# Patient Record
Sex: FEMALE | Race: BLACK OR AFRICAN AMERICAN | NOT HISPANIC OR LATINO | Employment: OTHER | URBAN - METROPOLITAN AREA
[De-identification: names, ages, dates, MRNs, and addresses within clinical notes are randomized per-mention and may not be internally consistent; named-entity substitution may affect disease eponyms.]

---

## 2017-01-19 ENCOUNTER — TELEPHONE (OUTPATIENT)
Dept: FAMILY MEDICINE | Facility: CLINIC | Age: 57
End: 2017-01-19

## 2017-01-19 NOTE — TELEPHONE ENCOUNTER
Called, left message that we received a form through fax and she has not been seen may 2016 and would need an appt to get this form fill out. Put form in drawer at .

## 2017-01-23 ENCOUNTER — OFFICE VISIT (OUTPATIENT)
Dept: FAMILY MEDICINE | Facility: CLINIC | Age: 57
End: 2017-01-23

## 2017-01-23 VITALS
HEIGHT: 64 IN | HEART RATE: 101 BPM | OXYGEN SATURATION: 100 % | WEIGHT: 210.8 LBS | TEMPERATURE: 97.8 F | SYSTOLIC BLOOD PRESSURE: 119 MMHG | BODY MASS INDEX: 35.99 KG/M2 | DIASTOLIC BLOOD PRESSURE: 80 MMHG

## 2017-01-23 DIAGNOSIS — M54.32 SCIATIC NERVE PAIN, LEFT: Primary | ICD-10-CM

## 2017-01-23 RX ORDER — CYCLOBENZAPRINE HCL 10 MG
5-10 TABLET ORAL
Qty: 90 TABLET | Refills: 1 | Status: SHIPPED
Start: 2017-01-23 | End: 2017-03-17

## 2017-01-23 RX ORDER — ACETAMINOPHEN 500 MG
1000 TABLET ORAL 2 TIMES DAILY
Qty: 180 TABLET | Refills: 0 | Status: SHIPPED
Start: 2017-01-23 | End: 2019-04-05

## 2017-01-23 RX ORDER — GABAPENTIN 100 MG/1
100 CAPSULE ORAL 3 TIMES DAILY
Qty: 90 CAPSULE | Refills: 0 | Status: SHIPPED
Start: 2017-01-23 | End: 2017-03-17

## 2017-01-23 NOTE — PROGRESS NOTES
"       HPI:   Sushila Flores is a 56 year old  female with PMH of diffuse osteoarthritis who presents with forms. Patient requesting forms be filled out for her student loans so that they may be deferred as she is currently unable to work. Patient has paperwork for disability -- was supported by [facility] whom she follows with for low back pain. Also seen for bilateral knee OA. Is frustrated by care at Spine as they continue to recommend surgery and patient desires to avoid surgery -- last visit was in May 2016. Notes that patient has with her reveal diagnosis of lumbar spondylolisthesis and spine stenosis. She notes her pain started 15+ years ago -- she noted she had injured her right knee and subsequently had altered her gait, causing the start of her low back pain which was further worsened in 2000 when she fell onto her right hip while pregnant. Since that time she has had chronic low back pain. She has tried numerous treatments in the past. She notes that she has done physical therapy off and on over the years and found this helpful. Most recently, she was able to attend 4 sessions last year though then was unable to obtain medical rides through insurance (limited her to 2 rides per month) and was therefore no longer able to get to appointments. She felt she was getting stronger and desires to restart. Currently is doing home exercises 3x's per week. She has tried medications (various) and injections (didn't help) in the past. She describes her pain as \"agonizing burning sensation\" that won't go away, across entire low back with pain down her left leg to her toes.     Currently for pain she is using ibuprofen 400 mg BID per day, mostly this helps with her knee pain. Occasionally using lidoderm patches at night or a heating pad. Notes sleeping in recliner helps back at night. Previously was on gabapentin, felt this was helpful initially then \"wore off\". Flexeril helpful in the past.          PMHX: "     Patient Active Problem List   Diagnosis     Sciatic nerve pain     Vitamin D deficiency     Chronic back pain     Bilateral thumb pain     Bilateral carpal tunnel syndrome     Osteoarthritis     Primary osteoarthritis of both knees     Pap smear for cervical cancer screening       Current Outpatient Prescriptions   Medication Sig Dispense Refill     gabapentin (NEURONTIN) 100 MG capsule Take 1 capsule (100 mg) by mouth 3 times daily 90 capsule 0     acetaminophen (TYLENOL) 500 MG tablet Take 2 tablets (1,000 mg) by mouth 2 times daily 180 tablet 0     cyclobenzaprine (FLEXERIL) 10 MG tablet Take 0.5-1 tablets (5-10 mg) by mouth nightly as needed for muscle spasms 90 tablet 1     lidocaine (LIDODERM) 5 % patch Apply up to 3 patches to painful area at once for up to 12 h within a 24 h period.  Remove after 12 hours. 30 patch 2     acetaminophen (TYLENOL) 500 MG tablet Take 2 tablets (1,000 mg) by mouth every 6 hours as needed for mild pain 100 tablet 0       Family History   Problem Relation Age of Onset     DIABETES Brother      DIABETES Maternal Grandmother      Hypertension Sister      Other Cancer Maternal Grandfather      Breast Cancer Maternal Aunt      Breast Cancer Paternal Aunt      Other Cancer Paternal Grandmother      stomach cancer     Asthma No family hx of        Social History   Substance Use Topics     Smoking status: Former Smoker     Types: Cigarettes     Quit date: 08/01/2013     Smokeless tobacco: Never Used     Alcohol Use: 0.0 oz/week     0 Standard drinks or equivalent per week      Comment: ocassionally       Social History     Social History Narrative    Works at [employer]. 4 children, one set of twins. 3 grandkids.       Allergies   Allergen Reactions     Amoxicillin Swelling     Penicillin G Nausea and Vomiting     Percocet [Oxycodone-Acetaminophen]      Vicodin [Hydrocodone-Acetaminophen]        No results found for this or any previous visit (from the past 24  "hour(s)).         Physical Exam:     Filed Vitals:    01/23/17 1611 01/23/17 1616   BP: 145/92 119/80   Pulse: 101    Temp: 97.8  F (36.6  C)    TempSrc: Oral    Height: 5' 3.5\" (161.3 cm)    Weight: 210 lb 12.8 oz (95.618 kg)    SpO2: 100%      Body mass index is 36.75 kg/(m^2).    GENERAL APPEARANCE: healthy, alert and no distress  BACK: No excessive lordosises or kyphosis. Diffuse low back tenderness, most pronounced over left SI region. Normal lumbar flexion and extension.  Sensation grossly intact in bilateral lower extremities. 5/5 strength in hip, knee, and ankle flexion and extension. Reflexes normal and symmetric at patella and achilles.     Assessment and Plan   1. Sciatic nerve pain, left  Patient has been seen exclusively at [facility] spine for this in the past though frustrated as feels all she is currently being offered surgery, which she would like to avoid if able (mother had this surgery and ended up needing 4 repeat operations). She would like to transfer care to me for ongoing management of back pain. We reviewed previous medications. She desires to restart physical therapy at this time. She reports insurance will only pay for 2 medical rides per month, though there is a form we can fill out to ask for more -- patient will work on getting this. We reviewed medications -- we will start scheduled tylenol, restart gapapentin at 100 mg TID and flexeril PRN at night to try and get her pain more manageable. Patient desires to eventually go back to work but at this time is unable to due to back pain. BRADLEY was requested for  Spine records so that we can fully understand her symptoms, previous evaluation/treatment and imaging.   - gabapentin (NEURONTIN) 100 MG capsule; Take 1 capsule (100 mg) by mouth 3 times daily  Dispense: 90 capsule; Refill: 0  - acetaminophen (TYLENOL) 500 MG tablet; Take 2 tablets (1,000 mg) by mouth 2 times daily  Dispense: 180 tablet; Refill: 0  - cyclobenzaprine (FLEXERIL) 10 " MG tablet; Take 0.5-1 tablets (5-10 mg) by mouth nightly as needed for muscle spasms  Dispense: 90 tablet; Refill: 1  - PHYSICAL THERAPY REFERRAL; Future    RTC in 2 weeks for follow up of pain.    Options for treatment and follow-up care were reviewed with the patient and/or guardian. Chester MEDARDO Flores and/or guardian engaged in the decision making process and verbalized understanding of the options discussed and agreed with the final plan.    Dipti Self MD  [facility] Medicine Resident  Pager# [phone]    Precepted with: Marco Antonio Vences MD

## 2017-01-23 NOTE — MR AVS SNAPSHOT
After Visit Summary   1/23/2017    Sushila Flores    MRN: [id number]           Patient Information     Date Of Birth          1960        Visit Information        Provider Department      1/23/2017 4:00 PM Dipti Self MD [facility]        Today's Diagnoses     Sciatic nerve pain, left    -  1       Care Instructions    Take 1000 mg tylenol twice daily every day.    Take 100 mg gabapentin three times daily every day.    Use 5-10 flexeril at bedtime as needed.    Restart physical therapy.    Follow up in 2 weeks.         Follow-ups after your visit        Additional Services     PHYSICAL THERAPY REFERRAL       PT/OT REFERRAL  Sushila Flores  1960  Phone #: [phone] (home)    needed: No  Language: English    PT/OT  Facility:   [facility] in [place]    History: history of chronic back with left sided sciatica    Precautions/Contraindications: None    Evaluate and treat up to 12 session, please teach home exercise plan  Goals of decreased pain, increased ROM and flexibility                  Your next 10 appointments already scheduled     Feb 06, 2017  1:20 PM   Return Visit with Dipti Self MD   [facility] ([facility])    [address] [place] [address]   [phone]              Future tests that were ordered for you today     Open Future Orders        Priority Expected Expires Ordered    PHYSICAL THERAPY REFERRAL Routine  4/23/2017 1/23/2017            Who to contact     Please call your clinic at [phone] to:    Ask questions about your health    Make or cancel appointments    Discuss your medicines    Learn about your test results    Speak to your doctor   If you have compliments or concerns about an experience at your clinic, or if you wish to file a complaint, please contact [facility] Patient Relations at [phone] or email us at [email or web address]         Additional  "Information About Your Visit        [employer] Information     [employer] gives you secure access to your electronic health record. If you see a primary care provider, you can also send messages to your care team and make appointments. If you have questions, please call your primary care clinic.  If you do not have a primary care provider, please call [phone] and they will assist you.      [employer] is an electronic gateway that provides easy, online access to your medical records. With [employer], you can request a clinic appointment, read your test results, renew a prescription or communicate with your care team.     To access your existing account, please contact your [facility] or call [phone] for assistance.        Care EveryWhere ID     This is your Care EveryWhere ID. This could be used by other organizations to access your [facility] medical records  [id number]        Your Vitals Were     Pulse Temperature Height BMI (Body Mass Index) Pulse Oximetry       101 97.8  F (36.6  C) (Oral) 5' 3.5\" (161.3 cm) 36.75 kg/m2 100%        Blood Pressure from Last 3 Encounters:   01/23/17 119/80   05/27/16 132/84   04/05/16 120/84    Weight from Last 3 Encounters:   01/23/17 210 lb 12.8 oz (95.618 kg)   05/27/16 204 lb 3.2 oz (92.625 kg)   04/05/16 210 lb 12.8 oz (95.618 kg)                 Today's Medication Changes          These changes are accurate as of: 1/23/17  4:49 PM.  If you have any questions, ask your nurse or doctor.               These medicines have changed or have updated prescriptions.        Dose/Directions    * acetaminophen 500 MG tablet   Commonly known as:  TYLENOL   This may have changed:  Another medication with the same name was added. Make sure you understand how and when to take each.   Used for:  Bilateral thumb pain   Changed by:  Dipti Self MD        Dose:  1000 mg   Take 2 tablets (1,000 mg) by mouth every 6 hours as needed for mild pain   Quantity:  " 100 tablet   Refills:  0       * acetaminophen 500 MG tablet   Commonly known as:  TYLENOL   This may have changed:  You were already taking a medication with the same name, and this prescription was added. Make sure you understand how and when to take each.   Used for:  Sciatic nerve pain, left   Changed by:  Dipti Self MD        Dose:  1000 mg   Take 2 tablets (1,000 mg) by mouth 2 times daily   Quantity:  180 tablet   Refills:  0       cyclobenzaprine 10 MG tablet   Commonly known as:  FLEXERIL   This may have changed:    - medication strength  - how much to take  - when to take this   Used for:  Sciatic nerve pain, left   Changed by:  Dipti Self MD        Dose:  5-10 mg   Take 0.5-1 tablets (5-10 mg) by mouth nightly as needed for muscle spasms   Quantity:  90 tablet   Refills:  1       gabapentin 100 MG capsule   Commonly known as:  NEURONTIN   This may have changed:  how much to take   Used for:  Sciatic nerve pain, left   Changed by:  Dipti Self MD        Dose:  100 mg   Take 1 capsule (100 mg) by mouth 3 times daily   Quantity:  90 capsule   Refills:  0       * Notice:  This list has 2 medication(s) that are the same as other medications prescribed for you. Read the directions carefully, and ask your doctor or other care provider to review them with you.      Stop taking these medicines if you haven't already. Please contact your care team if you have questions.     cetirizine 10 MG tablet   Commonly known as:  zyrTEC   Stopped by:  Dipti Self MD           fluticasone 50 MCG/ACT spray   Commonly known as:  FLONASE   Stopped by:  Dipti Self MD                Where to get your medicines      These medications were sent to [id number] - [place], [place] - [address], [place] [address]     Phone:  [phone]    - acetaminophen 500 MG tablet  - cyclobenzaprine 10 MG tablet  - gabapentin 100 MG capsule              Primary Care Provider Office Phone # Fax #    Dipti Parisa Self [phone]       [facility] [address] [place] [address]        Thank you!     Thank you for choosing [facility]  for your care. Our goal is always to provide you with excellent care. Hearing back from our patients is one way we can continue to improve our services. Please take a few minutes to complete the written survey that you may receive in the mail after your visit with us. Thank you!             Your Updated Medication List - Protect others around you: Learn how to safely use, store and throw away your medicines at www.disposemymeds.org.          This list is accurate as of: 1/23/17  4:49 PM.  Always use your most recent med list.                   Brand Name Dispense Instructions for use    * acetaminophen 500 MG tablet    TYLENOL    100 tablet    Take 2 tablets (1,000 mg) by mouth every 6 hours as needed for mild pain       * acetaminophen 500 MG tablet    TYLENOL    180 tablet    Take 2 tablets (1,000 mg) by mouth 2 times daily       cyclobenzaprine 10 MG tablet    FLEXERIL    90 tablet    Take 0.5-1 tablets (5-10 mg) by mouth nightly as needed for muscle spasms       gabapentin 100 MG capsule    NEURONTIN    90 capsule    Take 1 capsule (100 mg) by mouth 3 times daily       lidocaine 5 % Patch    LIDODERM    30 patch    Apply up to 3 patches to painful area at once for up to 12 h within a 24 h period.  Remove after 12 hours.       * Notice:  This list has 2 medication(s) that are the same as other medications prescribed for you. Read the directions carefully, and ask your doctor or other care provider to review them with you.

## 2017-01-23 NOTE — PATIENT INSTRUCTIONS
Take 1000 mg tylenol twice daily every day.    Take 100 mg gabapentin three times daily every day.    Use 5-10 flexeril at bedtime as needed.    Restart physical therapy.    Follow up in 2 weeks.           Spoke with patient on the referral for physical therapy.  She wanted to schedule hew own appt. And was given number to [facility] in [place] and referral has also been faxed to clinic.     Miguel

## 2017-01-23 NOTE — PROGRESS NOTES
Preceptor Attestation:  Patient's case reviewed and discussed with Dipti Self MD Patient seen and discussed with the resident.. I agree with assessment and plan of care.  Supervising Physician:  Marco Antonio Vences MD  [facility]

## 2017-02-07 PROBLEM — M47.812 SPONDYLOSIS OF CERVICAL REGION WITHOUT MYELOPATHY OR RADICULOPATHY: Status: ACTIVE | Noted: 2017-02-07

## 2017-03-17 ENCOUNTER — OFFICE VISIT (OUTPATIENT)
Dept: FAMILY MEDICINE | Facility: CLINIC | Age: 57
End: 2017-03-17

## 2017-03-17 ENCOUNTER — RECORDS - HEALTHEAST (OUTPATIENT)
Dept: ADMINISTRATIVE | Facility: OTHER | Age: 57
End: 2017-03-17

## 2017-03-17 VITALS
TEMPERATURE: 98 F | SYSTOLIC BLOOD PRESSURE: 123 MMHG | HEART RATE: 94 BPM | WEIGHT: 212 LBS | HEIGHT: 64 IN | RESPIRATION RATE: 16 BRPM | OXYGEN SATURATION: 100 % | BODY MASS INDEX: 36.19 KG/M2 | DIASTOLIC BLOOD PRESSURE: 81 MMHG

## 2017-03-17 DIAGNOSIS — Z12.11 COLON CANCER SCREENING: ICD-10-CM

## 2017-03-17 DIAGNOSIS — Z12.39 BREAST CANCER SCREENING: ICD-10-CM

## 2017-03-17 DIAGNOSIS — R73.09 ELEVATED HEMOGLOBIN A1C: ICD-10-CM

## 2017-03-17 DIAGNOSIS — M54.32 SCIATIC NERVE PAIN, LEFT: Primary | ICD-10-CM

## 2017-03-17 DIAGNOSIS — Z11.59 NEED FOR HEPATITIS C SCREENING TEST: ICD-10-CM

## 2017-03-17 DIAGNOSIS — K59.00 CONSTIPATION, UNSPECIFIED CONSTIPATION TYPE: ICD-10-CM

## 2017-03-17 DIAGNOSIS — Z23 IMMUNIZATION DUE: ICD-10-CM

## 2017-03-17 LAB
BUN SERPL-MCNC: 11 MG/DL (ref 7–30)
CALCIUM SERPL-MCNC: 9.3 MG/DL (ref 8.5–10.4)
CHLORIDE SERPLBLD-SCNC: 103 MMOL/L (ref 94–109)
CHOLEST SERPL-MCNC: 193 MG/DL
CHOLEST/HDLC SERPL: 3 RATIO
CO2 SERPL-SCNC: 25 MMOL/L (ref 20–32)
CREAT SERPL-MCNC: 0.9 MG/DL (ref 0.6–1.3)
EGFR CALCULATED (BLACK REFERENCE): 83.9 ML/MIN
EGFR CALCULATED (NON BLACK REFERENCE): 69.3 ML/MIN
GLUCOSE SERPL-MCNC: 112 MG/DL (ref 60–109)
HBA1C MFR BLD: 6.7 % (ref 4.1–5.7)
HDLC SERPL-MCNC: 65 MG/DL
LDLC SERPL CALC-MCNC: 112 MG/DL (ref 0–99)
POTASSIUM SERPL-SCNC: 4.2 MMOL/L (ref 3.4–5.3)
SODIUM SERPL-SCNC: 138 MMOL/L (ref 133–144)
TRIGL SERPL-MCNC: 82 MG/DL
VLDL-CHOLESTEROL: 16 MG/DL (ref 7–32)

## 2017-03-17 RX ORDER — POLYETHYLENE GLYCOL 3350 17 G/17G
1 POWDER, FOR SOLUTION ORAL DAILY
Qty: 510 G | Refills: 1 | Status: SHIPPED
Start: 2017-03-17 | End: 2019-07-25

## 2017-03-17 RX ORDER — CYCLOBENZAPRINE HCL 10 MG
10 TABLET ORAL
Qty: 90 TABLET | Refills: 0 | Status: SHIPPED
Start: 2017-03-17 | End: 2018-05-30

## 2017-03-17 RX ORDER — AMOXICILLIN 250 MG
1 CAPSULE ORAL 2 TIMES DAILY
Qty: 180 TABLET | Refills: 1 | Status: SHIPPED
Start: 2017-03-17 | End: 2019-04-05

## 2017-03-17 RX ORDER — GABAPENTIN 100 MG/1
100 CAPSULE ORAL AT BEDTIME
Qty: 90 CAPSULE | Refills: 0 | COMMUNITY
Start: 2017-03-17 | End: 2017-11-08

## 2017-03-17 ASSESSMENT — ANXIETY QUESTIONNAIRES
2. NOT BEING ABLE TO STOP OR CONTROL WORRYING: SEVERAL DAYS
1. FEELING NERVOUS, ANXIOUS, OR ON EDGE: SEVERAL DAYS
3. WORRYING TOO MUCH ABOUT DIFFERENT THINGS: SEVERAL DAYS
5. BEING SO RESTLESS THAT IT IS HARD TO SIT STILL: NOT AT ALL
6. BECOMING EASILY ANNOYED OR IRRITABLE: SEVERAL DAYS
7. FEELING AFRAID AS IF SOMETHING AWFUL MIGHT HAPPEN: NOT AT ALL
IF YOU CHECKED OFF ANY PROBLEMS ON THIS QUESTIONNAIRE, HOW DIFFICULT HAVE THESE PROBLEMS MADE IT FOR YOU TO DO YOUR WORK, TAKE CARE OF THINGS AT HOME, OR GET ALONG WITH OTHER PEOPLE: SOMEWHAT DIFFICULT
GAD7 TOTAL SCORE: 4

## 2017-03-17 ASSESSMENT — PATIENT HEALTH QUESTIONNAIRE - PHQ9: 5. POOR APPETITE OR OVEREATING: NOT AT ALL

## 2017-03-17 NOTE — PATIENT INSTRUCTIONS
For back pain:  -take tylenol 1000 mg twice daily  -take gabapentin 100 mg only at night  -take flexeril 10 mg at night  -do exercises in the morning  -ok to use ice or heat  -go to physical therapy as it works for your schedule  -stay active!    For constipation:  -start pericolace 1 pill twice daily  -use 1 capful of miralax daily  -drink lots of water (8 glasses daily!)  -eat enough fiber (goal of 20-30 grams daily)    Follow up in 1 month.      Referral for ( TEST )  :      Mammogram  LOCATION/PLACE/Provider :    [address] [address]  DATE & TIME :     3-20-17 at 11:15  PHONE :     [phone]  FAX :     [phone]  ADDITIONAL INFORMATION :     NA  Appointment made by clinic staff/:    Miguel

## 2017-03-17 NOTE — MR AVS SNAPSHOT
After Visit Summary   3/17/2017    Sushila Flores    MRN: [id number]           Patient Information     Date Of Birth          1960        Visit Information        Provider Department      3/17/2017 1:20 PM Dipti Self MD [facility]        Today's Diagnoses     Sciatic nerve pain, left    -  1    Constipation, unspecified constipation type        Elevated hemoglobin A1c        Immunization due        Colon cancer screening        Breast cancer screening        Need for hepatitis C screening test          Care Instructions    For back pain:  -take tylenol 1000 mg twice daily  -take gabapentin 100 mg only at night  -take flexeril 10 mg at night  -do exercises in the morning  -ok to use ice or heat  -go to physical therapy as it works for your schedule  -stay active!    For constipation:  -start pericolace 1 pill twice daily  -use 1 capful of miralax daily  -drink lots of water (8 glasses daily!)  -eat enough fiber (goal of 20-30 grams daily)    Follow up in 1 month.        Follow-ups after your visit        Future tests that were ordered for you today     Open Future Orders        Priority Expected Expires Ordered    Fecal Occult Blood - FIT, iFOB ([email or web address] FM) Routine  4/24/2017 3/17/2017            Who to contact     Please call your clinic at [phone] to:    Ask questions about your health    Make or cancel appointments    Discuss your medicines    Learn about your test results    Speak to your doctor   If you have compliments or concerns about an experience at your clinic, or if you wish to file a complaint, please contact [facility] Patient Relations at [phone] or email us at [email or web address]         Additional Information About Your Visit        MyChart Information     [employer] gives you secure access to your electronic health record. If you see a primary care provider, you can also send messages to your care team and make  "appointments. If you have questions, please call your primary care clinic.  If you do not have a primary care provider, please call [phone] and they will assist you.      [employer] is an electronic gateway that provides easy, online access to your medical records. With [employer], you can request a clinic appointment, read your test results, renew a prescription or communicate with your care team.     To access your existing account, please contact your [facility] or call [phone] for assistance.        Care EveryWhere ID     This is your Care EveryWhere ID. This could be used by other organizations to access your Mobile medical records  [id number]        Your Vitals Were     Pulse Temperature Respirations Height Pulse Oximetry BMI (Body Mass Index)    94 98  F (36.7  C) (Oral) 16 5' 3.5\" (161.3 cm) 100% 36.97 kg/m2       Blood Pressure from Last 3 Encounters:   03/17/17 123/81   01/23/17 119/80   05/27/16 132/84    Weight from Last 3 Encounters:   03/17/17 212 lb (96.2 kg)   01/23/17 210 lb 12.8 oz (95.6 kg)   05/27/16 204 lb 3.2 oz (92.6 kg)              We Performed the Following     ADMIN VACCINE, INITIAL     Basic Metabolic Panel ([facility]) - Results < 1 hr     Hemoglobin A1c (P )     Hepatitis C Antibody ([facility])     Lipid Panel ([facility])     MA SCREENING DIGITAL BILAT     TDAP ([employer]RIX AGES 10 and older)          Today's Medication Changes          These changes are accurate as of: 3/17/17  1:56 PM.  If you have any questions, ask your nurse or doctor.               Start taking these medicines.        Dose/Directions    polyethylene glycol powder   Commonly known as:  MIRALAX   Used for:  Constipation, unspecified constipation type   Started by:  Dipti Self MD        Dose:  1 capful   Take 17 g (1 capful) by mouth daily   Quantity:  510 g   Refills:  1       senna-docusate 8.6-50 MG per tablet   Commonly known as:  SENOKOT-S;PERICOLACE   Used for:  " Constipation, unspecified constipation type   Started by:  Dipti Self MD        Dose:  1 tablet   Take 1 tablet by mouth 2 times daily   Quantity:  180 tablet   Refills:  1         These medicines have changed or have updated prescriptions.        Dose/Directions    acetaminophen 500 MG tablet   Commonly known as:  TYLENOL   This may have changed:  Another medication with the same name was removed. Continue taking this medication, and follow the directions you see here.   Used for:  Sciatic nerve pain, left   Changed by:  Dipti Self MD        Dose:  1000 mg   Take 2 tablets (1,000 mg) by mouth 2 times daily   Quantity:  180 tablet   Refills:  0       cyclobenzaprine 10 MG tablet   Commonly known as:  FLEXERIL   This may have changed:  how much to take   Used for:  Sciatic nerve pain, left   Changed by:  Dipti Self MD        Dose:  10 mg   Take 1 tablet (10 mg) by mouth nightly as needed for muscle spasms   Quantity:  90 tablet   Refills:  0       gabapentin 100 MG capsule   Commonly known as:  NEURONTIN   This may have changed:  when to take this   Used for:  Sciatic nerve pain, left   Changed by:  Dipti Self MD        Dose:  100 mg   Take 1 capsule (100 mg) by mouth At Bedtime   Quantity:  90 capsule   Refills:  0         Stop taking these medicines if you haven't already. Please contact your care team if you have questions.     lidocaine 5 % Patch   Commonly known as:  LIDODERM   Stopped by:  Dipti Self MD                Where to get your medicines      These medications were sent to [id number] - [place], [place] - [address], [place] [address]     Phone:  [phone]     cyclobenzaprine 10 MG tablet    polyethylene glycol powder    senna-docusate 8.6-50 MG per tablet                Primary Care Provider Office Phone # Fax #    Dipti Self [phone]       [facility] [id number]  [facility] [address]        Thank you!     Thank you for choosing [facility]  for your care. Our goal is always to provide you with excellent care. Hearing back from our patients is one way we can continue to improve our services. Please take a few minutes to complete the written survey that you may receive in the mail after your visit with us. Thank you!             Your Updated Medication List - Protect others around you: Learn how to safely use, store and throw away your medicines at www.disposemymeds.org.          This list is accurate as of: 3/17/17  1:56 PM.  Always use your most recent med list.                   Brand Name Dispense Instructions for use    acetaminophen 500 MG tablet    TYLENOL    180 tablet    Take 2 tablets (1,000 mg) by mouth 2 times daily       cyclobenzaprine 10 MG tablet    FLEXERIL    90 tablet    Take 1 tablet (10 mg) by mouth nightly as needed for muscle spasms       gabapentin 100 MG capsule    NEURONTIN    90 capsule    Take 1 capsule (100 mg) by mouth At Bedtime       polyethylene glycol powder    MIRALAX    510 g    Take 17 g (1 capful) by mouth daily       senna-docusate 8.6-50 MG per tablet    SENOKOT-S;PERICOLACE    180 tablet    Take 1 tablet by mouth 2 times daily

## 2017-03-17 NOTE — PROGRESS NOTES
"       HPI:   Sushila Flores is a 56 year old  female with PMH of chronic back pain with sciatica who presents for follow up. Last seen on 1/23, planned for me to assume care of her back pain. Had been seen by [facility] but recommendation for surgery last summer, which patient does not want to pursue. Records reviewed from [facility] -- 12/15 lumbar MRI with 2 mm spondylolisthesis at L5/S1 and L4/L5 with 2mm right posterolateral disk protrusion at T11/12. Has tried numerous therapies in the past, including physical therapy, epidural injections, and NSAIDs without significant relief. Patients mother had back surgery, requiring frequent repeat surgeries, thus patient is motivated to avoid.     At 1/23 visit, we discussed using tylenol 1000 mg BID with 100 mg TID and flexeril 10 mg prn as well as restarting physical therapy as patient felt this was previous helpful. Currently, she is using tylenol PRN instead of scheduled. She has been taking 100 mg gabapentin at bedtime only for 1 week as she was sedated when taking during daytime. Unfortunately, she has only been able to make 2 therapy appointments due to needing to provide  for her 10 month old grandson. Has been doing home exercises nightly, as this is when pain is the worst. Notes she \"tenses up\" after completing exercises and then is unable to rest comfortably. Sleeping well despite pain, using flexeril 10 mg and gabapentin 100 mg nightly.     Has been constipated for the past month. Notes BMs are \"little nuggets\" and quite hard. Feels better after taking Maalox. Crampy abdominal pain, occasional nausea, vomited x's 1 after eating. Notes she is drinking less water so she urinates less.          PMHX:     Patient Active Problem List   Diagnosis     Sciatic nerve pain     Vitamin D deficiency     Chronic back pain     Bilateral thumb pain     Bilateral carpal tunnel syndrome     Osteoarthritis     Primary osteoarthritis of both knees     Pap " "smear for cervical cancer screening     Spondylosis of cervical region without myelopathy or radiculopathy       Current Outpatient Prescriptions   Medication Sig Dispense Refill     cyclobenzaprine (FLEXERIL) 10 MG tablet Take 1 tablet (10 mg) by mouth nightly as needed for muscle spasms 90 tablet 0     gabapentin (NEURONTIN) 100 MG capsule Take 1 capsule (100 mg) by mouth At Bedtime 90 capsule 0     senna-docusate (SENOKOT-S;PERICOLACE) 8.6-50 MG per tablet Take 1 tablet by mouth 2 times daily 180 tablet 1     polyethylene glycol (MIRALAX) powder Take 17 g (1 capful) by mouth daily 510 g 1     acetaminophen (TYLENOL) 500 MG tablet Take 2 tablets (1,000 mg) by mouth 2 times daily (Patient not taking: Reported on 3/17/2017) 180 tablet 0       Family History   Problem Relation Age of Onset     DIABETES Brother      DIABETES Maternal Grandmother      Hypertension Sister      Other Cancer Maternal Grandfather      Breast Cancer Maternal Aunt      Breast Cancer Paternal Aunt      Other Cancer Paternal Grandmother      stomach cancer     Asthma No family hx of        Social History   Substance Use Topics     Smoking status: Former Smoker     Types: Cigarettes     Quit date: 8/1/2013     Smokeless tobacco: Never Used     Alcohol use 0.0 oz/week     0 Standard drinks or equivalent per week      Comment: ocassionally       Social History     Social History Narrative    Works at [employer]. 4 children, one set of twins. 3 grandkids.       Allergies   Allergen Reactions     Amoxicillin Swelling     Penicillin G Nausea and Vomiting     Percocet [Oxycodone-Acetaminophen]      Vicodin [Hydrocodone-Acetaminophen]             Physical Exam:     Vitals:    03/17/17 1303   BP: 123/81   Pulse: 94   Resp: 16   Temp: 98  F (36.7  C)   TempSrc: Oral   SpO2: 100%   Weight: 212 lb (96.2 kg)   Height: 5' 3.5\" (161.3 cm)     Body mass index is 36.97 kg/(m^2).    GENERAL APPEARANCE: healthy, alert and no distress, " obese  EYES: Eyes grossly normal to inspection  RESP: lungs clear to auscultation - no rales, rhonchi or wheezes  CV: regular rate and rhythm,  and no murmur, click,  rub or gallop  ABDOMEN: soft, nontender, without hepatosplenomegaly or masses, active bowel sounds  MS: extremities normal- no gross deformities noted, ambulating normally  SKIN: no suspicious lesions or rashes  PSYCH: mood and affect normal/bright    Assessment and Plan   1. Sciatic nerve pain, left  Recommended using tylenol 1000 mg BID on scheduled basis. Agree with plan to take gabapentin only at night at this time, with plans to slowly increase over time as tolerated. Ideally, would not be using flexeril nightly though notes it is significantly helpful at this time. Recommended home exercised in the morning, as opposed to at night, so she has the day to then stretch muscles. Encouraged continued activity (active with 10 month old grandson) with physical therapy appointments as her schedule allows (thinks it will be more stable in the next few months). Ok to use ice or heat as needed for pain. Reviewed goal of tolerable pain, not pain free.   - cyclobenzaprine (FLEXERIL) 10 MG tablet; Take 1 tablet (10 mg) by mouth nightly as needed for muscle spasms  Dispense: 90 tablet; Refill: 0  - gabapentin (NEURONTIN) 100 MG capsule; Take 1 capsule (100 mg) by mouth At Bedtime  Dispense: 90 capsule; Refill: 0    2. Constipation, unspecified constipation type  Likely contributing to back pain as well. Reviewed fiber/water intake as well as starting pericolace and miralax.   - senna-docusate (SENOKOT-S;PERICOLACE) 8.6-50 MG per tablet; Take 1 tablet by mouth 2 times daily  Dispense: 180 tablet; Refill: 1  - polyethylene glycol (MIRALAX) powder; Take 17 g (1 capful) by mouth daily  Dispense: 510 g; Refill: 1    3. Elevated hemoglobin A1c  Noted to be 6.0% in 2014, due for recheck today. Would consider initiation of metformin if still > 5.7%.  - Basic Metabolic  Panel ([facility]) - Results < 1 hr  - Lipid Panel ([facility])  - Hemoglobin A1c ([facility])    4. Immunization due  - ADMIN VACCINE, INITIAL  - TDAP (BOOSTRIX AGES 10 and older)    5. Colon cancer screening  - Fecal Occult Blood - FIT, iFOB ([facility]); Future    6. Breast cancer screening  - MA SCREENING DIGITAL BILAT    7. Need for hepatitis C screening test  - Hepatitis C Antibody ([facility])    Follow up in 1 month.     Options for treatment and follow-up care were reviewed with the patient and/or guardian. Sushila Flores and/or guardian engaged in the decision making process and verbalized understanding of the options discussed and agreed with the final plan.    Dipti Self MD  [facility] Resident  Pager# [phone]    Precepted with: Majo Ortega MD

## 2017-03-17 NOTE — NURSING NOTE
Gad7/phq9-given to complete  Hep C  Colonoscopy or FIT- agreed to fit     Mammogram- pt agreed   Tdap- given today

## 2017-03-18 ASSESSMENT — ANXIETY QUESTIONNAIRES: GAD7 TOTAL SCORE: 4

## 2017-03-18 ASSESSMENT — PATIENT HEALTH QUESTIONNAIRE - PHQ9: SUM OF ALL RESPONSES TO PHQ QUESTIONS 1-9: 12

## 2017-03-19 LAB — HCV AB SER QL: NEGATIVE

## 2017-03-20 ENCOUNTER — HOSPITAL ENCOUNTER (OUTPATIENT)
Dept: MAMMOGRAPHY | Facility: HOSPITAL | Age: 57
Discharge: HOME | End: 2017-03-20

## 2017-03-20 DIAGNOSIS — Z12.31 VISIT FOR SCREENING MAMMOGRAM: ICD-10-CM

## 2017-03-20 DIAGNOSIS — Z12.11 COLON CANCER SCREENING: ICD-10-CM

## 2017-03-20 LAB — HEMOCCULT STL QL IA: POSITIVE

## 2017-03-21 PROBLEM — E11.9 TYPE 2 DIABETES MELLITUS WITHOUT COMPLICATION (H): Status: ACTIVE | Noted: 2017-03-21

## 2017-03-21 PROBLEM — N18.2 CKD (CHRONIC KIDNEY DISEASE) STAGE 2, GFR 60-89 ML/MIN: Status: ACTIVE | Noted: 2017-03-21

## 2017-03-21 PROBLEM — Z91.89 CANDIDATE FOR STATIN THERAPY DUE TO RISK OF FUTURE CARDIOVASCULAR EVENT: Status: ACTIVE | Noted: 2017-03-21

## 2017-03-22 NOTE — PROGRESS NOTES
Preceptor Attestation:  Patient's case reviewed and discussed with Dipti Self MD resident and I evaluated the patient. I agree with written assessment and plan of care.    I spoke with Sushila separately re: potential alternative approaches for chronic pain such as yoga, massage, and acupuncture. She will check with her health insurance to determine if she has any coverage for these services. If not, group acupuncture may be an option.    Supervising Physician:  Majo Ortega   [facility]

## 2017-03-24 ENCOUNTER — MYC MEDICAL ADVICE (OUTPATIENT)
Dept: FAMILY MEDICINE | Facility: CLINIC | Age: 57
End: 2017-03-24

## 2017-04-13 ENCOUNTER — OFFICE VISIT (OUTPATIENT)
Dept: FAMILY MEDICINE | Facility: CLINIC | Age: 57
End: 2017-04-13

## 2017-04-13 VITALS
TEMPERATURE: 98.4 F | WEIGHT: 209.6 LBS | SYSTOLIC BLOOD PRESSURE: 130 MMHG | HEART RATE: 100 BPM | HEIGHT: 65 IN | OXYGEN SATURATION: 100 % | DIASTOLIC BLOOD PRESSURE: 82 MMHG | RESPIRATION RATE: 20 BRPM | BODY MASS INDEX: 34.92 KG/M2

## 2017-04-13 DIAGNOSIS — E11.9 TYPE 2 DIABETES MELLITUS WITHOUT COMPLICATION, WITHOUT LONG-TERM CURRENT USE OF INSULIN (H): ICD-10-CM

## 2017-04-13 DIAGNOSIS — Z91.89 CANDIDATE FOR STATIN THERAPY DUE TO RISK OF FUTURE CARDIOVASCULAR EVENT: ICD-10-CM

## 2017-04-13 DIAGNOSIS — M54.32 SCIATIC NERVE PAIN, LEFT: ICD-10-CM

## 2017-04-13 DIAGNOSIS — R19.5 POSITIVE FIT (FECAL IMMUNOCHEMICAL TEST): Primary | ICD-10-CM

## 2017-04-13 RX ORDER — ATORVASTATIN CALCIUM 80 MG/1
80 TABLET, FILM COATED ORAL DAILY
Qty: 90 TABLET | Refills: 3 | Status: SHIPPED | OUTPATIENT
Start: 2017-04-13 | End: 2019-07-31

## 2017-04-13 NOTE — PROGRESS NOTES
HPI:   Sushila Flores is a 56 year old  female with PMH of chronic low back pain and obesity who presents to review recent lab results. Saw results on MyChart and looked up what they meant. Was nervous about colon cancer and is agreeable to getting colonoscopy. Remembers that she was bearing down around the time of giving FIT sample so wonders if this was why positive. Constipation has improved with Miralax and Pericolace.     Also looked up what a hemoglobin a1c was and saw that she is now diabetic. Since this realization, she has started working on cleaning up her diet. Cut out pop (previously drinking 2L per day), stopped eating favorite snack of ice cream, and has started baking foods rather than frying. Is down 3 pounds since last visit, which she is very proud of.    Dr. Ortega had mentioned accupuncture for low back pain at last visit. Patient looked into this and found that insurance will cover 20 visits per year. Requesting rx for this.         PMHX:     Patient Active Problem List   Diagnosis     Sciatic nerve pain     Vitamin D deficiency     Chronic back pain     Bilateral carpal tunnel syndrome     Osteoarthritis of multiple joints     Pap smear for cervical cancer screening     Spondylosis of cervical region without myelopathy or radiculopathy     BMI 36.0-36.9,adult     Candidate for statin therapy due to risk of future cardiovascular event     Type 2 diabetes mellitus without complication (H)     CKD (chronic kidney disease) stage 2, GFR 60-89 ml/min       Current Outpatient Prescriptions   Medication Sig Dispense Refill     cyclobenzaprine (FLEXERIL) 10 MG tablet Take 1 tablet (10 mg) by mouth nightly as needed for muscle spasms 90 tablet 0     gabapentin (NEURONTIN) 100 MG capsule Take 1 capsule (100 mg) by mouth At Bedtime 90 capsule 0     senna-docusate (SENOKOT-S;PERICOLACE) 8.6-50 MG per tablet Take 1 tablet by mouth 2 times daily 180 tablet 1     polyethylene glycol (MIRALAX) powder  "Take 17 g (1 capful) by mouth daily 510 g 1     acetaminophen (TYLENOL) 500 MG tablet Take 2 tablets (1,000 mg) by mouth 2 times daily (Patient not taking: Reported on 3/17/2017) 180 tablet 0       Family History   Problem Relation Age of Onset     DIABETES Brother      DIABETES Maternal Grandmother      Hypertension Sister      Other Cancer Maternal Grandfather      Breast Cancer Maternal Aunt      Breast Cancer Paternal Aunt      Other Cancer Paternal Grandmother      stomach cancer     Asthma No family hx of        Social History   Substance Use Topics     Smoking status: Former Smoker     Types: Cigarettes     Quit date: 8/1/2013     Smokeless tobacco: Never Used     Alcohol use 0.0 oz/week     0 Standard drinks or equivalent per week      Comment: ocassionally       Social History     Social History Narrative    Works at [employer]. 4 children, one set of twins. 3 grandkids.       Allergies   Allergen Reactions     Amoxicillin Swelling     Penicillin G Nausea and Vomiting     Percocet [Oxycodone-Acetaminophen]      Vicodin [Hydrocodone-Acetaminophen]             Physical Exam:     Vitals:    04/13/17 1613   BP: 130/82   BP Location: Right arm   Patient Position: Chair   Cuff Size: Adult Large   Pulse: 100   Resp: 20   Temp: 98.4  F (36.9  C)   TempSrc: Oral   SpO2: 100%   Weight: 209 lb 9.6 oz (95.1 kg)   Height: 5' 4.5\" (163.8 cm)     Body mass index is 35.42 kg/(m^2).    GENERAL APPEARANCE: healthy, alert and no distress    Assessment and Plan   1. Positive FIT (fecal immunochemical test)  Reviewed potential etiology for positive test. Recommended colonoscopy and patient agreeable. Referral sent -- she will be called by [facility] to schedule.     2. Type 2 diabetes mellitus without complication, without long-term current use of insulin (H)  Reviewed new diagnosis of diabetes with a1c > 6.5%. Discussed basics of diabetes and long/short term complications. Reviewed recommendation for metformin and " patient agreeable. Common side effects reviewed and titration schedule discussed. Based on ASCVD risk of 5% in setting of DM, statin therapy was also recommended and patient agreed after discussing risks/benefits. No indication for ASA for primary prevention with ASCVD < 10%. BP at goal today. Rx for glucose meter provided -- will check blood sugars as needed for illness, reviewed no indication for regular checks while on oral medications.Diabetes education offered and patient declined at this time as feels she is making good changes on her own. Will rediscuss at next visit as I think this would be beneficial for her. Next A1c due in September 2017.  - Blood Gluc Meter Disp-Strips (BLOOD GLUCOSE METER DISPOSABLE) MARCIANO; Dispense per preferred. Test sugars as needed Dx250.0 Strips and lancets #90  Dispense: 1 each; Refill: 0  - atorvastatin (LIPITOR) 80 MG tablet; Take 1 tablet (80 mg) by mouth daily  Dispense: 90 tablet; Refill: 3  - metFORMIN (GLUCOPHAGE) 500 MG tablet; Take 2 tablets (1,000 mg) by mouth 2 times daily (with meals)  Dispense: 360 tablet; Refill: 3    3. Candidate for statin therapy due to risk of future cardiovascular event  As above. Recheck lipids in 1 year.   - atorvastatin (LIPITOR) 80 MG tablet; Take 1 tablet (80 mg) by mouth daily  Dispense: 90 tablet; Refill: 3    4. Sciatic nerve pain, left  Accupuncture covered by insurance. Rx provided.     5. BMI 36.0-36.9,adult  Congratulated on diet changes and weight loss. Has made simple changes in short period of time with good success. Discussed that weight loss will also help her chronic back pain. Would benefit from DM educator as well as above.     Follow up in 1 month for weight check.    Options for treatment and follow-up care were reviewed with the patient and/or guardian. Sushila Flores and/or guardian engaged in the decision making process and verbalized understanding of the options discussed and agreed with the final plan.    Dipti  MD Aramis  [facility] Resident  Pager# [phone]    Precepted with: Kiel Grossman MD

## 2017-04-13 NOTE — PATIENT INSTRUCTIONS
GREAT WORK on your diet changes!! Keep up the EXCELLENT work!!    For diabetes:  Start metformin. Goal will be 2 pills twice daily. Start with 1 pill at bedtime for 1 week, then increase to 1 pill in the morning and 1 pill at bedtime for 1 week, then 1 pill in the morning and 2 pills at night, then 2 pills twice daily ongoing.    For cholesterol:  Start atorvastatin 1 pill daily.    You will be called to schedule your colonoscopy.    Follow up in 1 month to check in how you are doing.

## 2017-04-13 NOTE — MR AVS SNAPSHOT
After Visit Summary   4/13/2017    Sushila Flores    MRN: [id number]           Patient Information     Date Of Birth          1960        Visit Information        Provider Department      4/13/2017 4:20 PM Dipti Self MD [facility]        Today's Diagnoses     Positive FIT (fecal immunochemical test)    -  1    Type 2 diabetes mellitus without complication, without long-term current use of insulin (H)        Candidate for statin therapy due to risk of future cardiovascular event        Sciatic nerve pain, left          Care Instructions    GREAT WORK on your diet changes!! Keep up the EXCELLENT work!!    For diabetes:  Start metformin. Goal will be 2 pills twice daily. Start with 1 pill at bedtime for 1 week, then increase to 1 pill in the morning and 1 pill at bedtime for 1 week, then 1 pill in the morning and 2 pills at night, then 2 pills twice daily ongoing.    For cholesterol:  Start atorvastatin 1 pill daily.    You will be called to schedule your colonoscopy.    Follow up in 1 month to check in how you are doing.            Follow-ups after your visit        Additional Services     GASTROENTEROLOGY ADULT REF PROCEDURE ONLY       Last Lab Result: Creatinine (mg/dL)       Date                     Value                 03/17/2017               0.9              ----------  Body mass index is 35.42 kg/(m^2).     Needed:  No  Language:  English    Patient will be contacted to schedule procedure.     Please be aware that coverage of these services is subject to the terms and limitations of your health insurance plan.  Call member services at your health plan with any benefit or coverage questions.  Any procedures must be performed at a [facility] facility OR coordinated by your clinic's referral office.    Please bring the following with you to your appointment:    (1) Any X-Rays, CTs or MRIs which have been performed.  Contact the facility where they were done to  "arrange for  prior to your scheduled appointment.    (2) List of current medications   (3) This referral request   (4) Any documents/labs given to you for this referral                  Follow-up notes from your care team     Return in about 4 weeks (around 5/11/2017) for Routine Visit.      Who to contact     Please call your clinic at [phone] to:    Ask questions about your health    Make or cancel appointments    Discuss your medicines    Learn about your test results    Speak to your doctor   If you have compliments or concerns about an experience at your clinic, or if you wish to file a complaint, please contact [facility] Patient Relations at [phone] or email us at [email or web address]         Additional Information About Your Visit        [employer] Information     [employer] gives you secure access to your electronic health record. If you see a primary care provider, you can also send messages to your care team and make appointments. If you have questions, please call your primary care clinic.  If you do not have a primary care provider, please call [phone] and they will assist you.      [employer] is an electronic gateway that provides easy, online access to your medical records. With [employer], you can request a clinic appointment, read your test results, renew a prescription or communicate with your care team.     To access your existing account, please contact your [facility] or call [phone] for assistance.        Care EveryWhere ID     This is your Care EveryWhere ID. This could be used by other organizations to access your [facility] medical records  [id number]        Your Vitals Were     Pulse Temperature Respirations Height Pulse Oximetry BMI (Body Mass Index)    100 98.4  F (36.9  C) (Oral) 20 5' 4.5\" (163.8 cm) 100% 35.42 kg/m2       Blood Pressure from Last 3 Encounters:   04/13/17 130/82   03/17/17 123/81 "   01/23/17 119/80    Weight from Last 3 Encounters:   04/13/17 209 lb 9.6 oz (95.1 kg)   03/17/17 212 lb (96.2 kg)   01/23/17 210 lb 12.8 oz (95.6 kg)              We Performed the Following     GASTROENTEROLOGY ADULT REF PROCEDURE ONLY          Today's Medication Changes          These changes are accurate as of: 4/13/17  5:00 PM.  If you have any questions, ask your nurse or doctor.               Start taking these medicines.        Dose/Directions    atorvastatin 80 MG tablet   Commonly known as:  LIPITOR   Used for:  Type 2 diabetes mellitus without complication, without long-term current use of insulin (H), Candidate for statin therapy due to risk of future cardiovascular event   Started by:  Dipti Self MD        Dose:  80 mg   Take 1 tablet (80 mg) by mouth daily   Quantity:  90 tablet   Refills:  3       BLOOD GLUCOSE METER DISPOSABLE Joyce   Used for:  Type 2 diabetes mellitus without complication, without long-term current use of insulin (H)   Started by:  Dipti Self MD        Dispense per preferred. Test sugars as needed Dx250.0 Strips and lancets #90   Quantity:  1 each   Refills:  0       metFORMIN 500 MG tablet   Commonly known as:  GLUCOPHAGE   Used for:  Type 2 diabetes mellitus without complication, without long-term current use of insulin (H)   Started by:  Dipti Self MD        Dose:  1000 mg   Take 2 tablets (1,000 mg) by mouth 2 times daily (with meals)   Quantity:  360 tablet   Refills:  3            Where to get your medicines      These medications were sent to [id number] - [place], [place] - [address], [place] [address]     Phone:  [phone]     atorvastatin 80 MG tablet    BLOOD GLUCOSE METER DISPOSABLE Joyce    metFORMIN 500 MG tablet                Primary Care Provider Office Phone # Fax #    Dipti Self [phone]       [facility] [address] [place] [address]         Thank you!     Thank you for choosing [facility]  for your care. Our goal is always to provide you with excellent care. Hearing back from our patients is one way we can continue to improve our services. Please take a few minutes to complete the written survey that you may receive in the mail after your visit with us. Thank you!             Your Updated Medication List - Protect others around you: Learn how to safely use, store and throw away your medicines at www.disposemymeds.org.          This list is accurate as of: 4/13/17  5:00 PM.  Always use your most recent med list.                   Brand Name Dispense Instructions for use    acetaminophen 500 MG tablet    TYLENOL    180 tablet    Take 2 tablets (1,000 mg) by mouth 2 times daily       atorvastatin 80 MG tablet    LIPITOR    90 tablet    Take 1 tablet (80 mg) by mouth daily       BLOOD GLUCOSE METER DISPOSABLE Joyce     1 each    Dispense per preferred. Test sugars as needed Dx250.0 Strips and lancets #90       cyclobenzaprine 10 MG tablet    FLEXERIL    90 tablet    Take 1 tablet (10 mg) by mouth nightly as needed for muscle spasms       gabapentin 100 MG capsule    NEURONTIN    90 capsule    Take 1 capsule (100 mg) by mouth At Bedtime       metFORMIN 500 MG tablet    GLUCOPHAGE    360 tablet    Take 2 tablets (1,000 mg) by mouth 2 times daily (with meals)       polyethylene glycol powder    MIRALAX    510 g    Take 17 g (1 capful) by mouth daily       senna-docusate 8.6-50 MG per tablet    SENOKOT-S;PERICOLACE    180 tablet    Take 1 tablet by mouth 2 times daily

## 2017-04-14 DIAGNOSIS — E11.9 TYPE 2 DIABETES MELLITUS WITHOUT COMPLICATION, WITHOUT LONG-TERM CURRENT USE OF INSULIN (H): ICD-10-CM

## 2017-04-18 NOTE — PROGRESS NOTES
Preceptor Attestation:  Patient's case reviewed and discussed with Dipti Self MD Patient seen and discussed with the resident.. I agree with assessment and plan of care.  Supervising Physician:  Kiel Grossman MD  [facility]

## 2017-05-18 ENCOUNTER — OFFICE VISIT (OUTPATIENT)
Dept: FAMILY MEDICINE | Facility: CLINIC | Age: 57
End: 2017-05-18

## 2017-05-18 VITALS
SYSTOLIC BLOOD PRESSURE: 148 MMHG | HEART RATE: 97 BPM | DIASTOLIC BLOOD PRESSURE: 109 MMHG | HEIGHT: 64 IN | WEIGHT: 204 LBS | OXYGEN SATURATION: 99 % | BODY MASS INDEX: 34.83 KG/M2 | RESPIRATION RATE: 16 BRPM | TEMPERATURE: 98 F

## 2017-05-18 DIAGNOSIS — E11.9 TYPE 2 DIABETES MELLITUS WITHOUT COMPLICATION, WITHOUT LONG-TERM CURRENT USE OF INSULIN (H): Primary | ICD-10-CM

## 2017-05-18 DIAGNOSIS — R03.0 ELEVATED BLOOD PRESSURE READING WITHOUT DIAGNOSIS OF HYPERTENSION: ICD-10-CM

## 2017-05-18 DIAGNOSIS — R30.0 DYSURIA: ICD-10-CM

## 2017-05-18 LAB
BACTERIA: NORMAL
BILIRUBIN UR: NEGATIVE
BLOOD UR: ABNORMAL
CASTS: NORMAL /LPF
CLARITY, URINE: CLEAR
COLOR UR: YELLOW
CREAT UR-MCNC: 44.3 MG/DL
CRYSTAL URINE: NORMAL /LPF
EPITHELIAL CELLS UR: NORMAL /LPF (ref 0–2)
GLUCOSE URINE: NEGATIVE
KETONES UR QL: NEGATIVE
LEUKOCYTE ESTERASE UR: NEGATIVE
MICROALBUMIN UR-MCNC: <0.5 MG/DL (ref 0–1.99)
MICROALBUMIN/CREAT UR: NORMAL MG/G
MUCOUS URINE: NORMAL LPF
NITRITE UR QL STRIP: NEGATIVE
PH UR STRIP: 5.5 [PH] (ref 5–7)
PROTEIN UR: NEGATIVE
RBC URINE: <2 /HPF
SP GR UR STRIP: <=1.005
UROBILINOGEN UR STRIP-ACNC: ABNORMAL
WBC URINE: NORMAL /HPF

## 2017-05-18 NOTE — MR AVS SNAPSHOT
After Visit Summary   5/18/2017    Sushila Flores    MRN: [id number]           Patient Information     Date Of Birth          1960        Visit Information        Provider Department      5/18/2017 3:40 PM Dipti Self MD [facility]        Today's Diagnoses     Type 2 diabetes mellitus without complication, without long-term current use of insulin (H)    -  1    Dysuria        Elevated blood pressure reading without diagnosis of hypertension          Care Instructions    Keep up the GREAT work with weight loss!!    Make sure to take some time for yourself with all the stress in your life.    Check blood sugars 1x per week. Check blood pressure 2x's per week    Follow up in 3 months.        Follow-ups after your visit        Follow-up notes from your care team     Return in about 3 months (around 8/18/2017).      Who to contact     Please call your clinic at [phone] to:    Ask questions about your health    Make or cancel appointments    Discuss your medicines    Learn about your test results    Speak to your doctor   If you have compliments or concerns about an experience at your clinic, or if you wish to file a complaint, please contact [facility] Patient Relations at [phone] or email us at [email or web address]         Additional Information About Your Visit        MyChart Information     What's Hot gives you secure access to your electronic health record. If you see a primary care provider, you can also send messages to your care team and make appointments. If you have questions, please call your primary care clinic.  If you do not have a primary care provider, please call [phone] and they will assist you.      What's Hot is an electronic gateway that provides easy, online access to your medical records. With What's Hot, you can request a clinic appointment, read your test results, renew a prescription or communicate with your care  "team.     To access your existing account, please contact your [facility] or call [phone] for assistance.        Care EveryWhere ID     This is your Care EveryWhere ID. This could be used by other organizations to access your [place] medical records  [id number]        Your Vitals Were     Pulse Temperature Respirations Height Pulse Oximetry BMI (Body Mass Index)    97 98  F (36.7  C) (Oral) 16 5' 3.6\" (161.5 cm) 99% 35.46 kg/m2       Blood Pressure from Last 3 Encounters:   05/18/17 (!) 148/109   04/13/17 130/82   03/17/17 123/81    Weight from Last 3 Encounters:   05/18/17 204 lb (92.5 kg)   04/13/17 209 lb 9.6 oz (95.1 kg)   03/17/17 212 lb (96.2 kg)              We Performed the Following     Microalbumin Creatinine Ratio Random Ur ([facility])     Urinalysis, Micro If ([facility])          Today's Medication Changes          These changes are accurate as of: 5/18/17  4:35 PM.  If you have any questions, ask your nurse or doctor.               Start taking these medicines.        Dose/Directions    blood glucose monitoring test strip   Commonly known as:  no brand specified   Used for:  Type 2 diabetes mellitus without complication, without long-term current use of insulin (H)   Started by:  Dipti Self MD        Use to test blood sugars 1-2 times daily or as directed   Quantity:  100 strip   Refills:  3       order for DME   Used for:  Type 2 diabetes mellitus without complication, without long-term current use of insulin (H)   Started by:  Dipti Self MD        Electronic blood pressure cuff - check blood pressure 2-3 times weekly   Quantity:  1 Device   Refills:  0            Where to get your medicines      These medications were sent to [id number] - [place], [place] - [address], [place] [address]     Phone:  [phone]     blood glucose monitoring test strip         Some of these will need a paper prescription " and others can be bought over the counter.  Ask your nurse if you have questions.     Bring a paper prescription for each of these medications     order for DME                Primary Care Provider Office Phone # Fax #    Dipti Parisa Self [phone]       [facility] [address] [place] [address]        Thank you!     Thank you for choosing [facility]  for your care. Our goal is always to provide you with excellent care. Hearing back from our patients is one way we can continue to improve our services. Please take a few minutes to complete the written survey that you may receive in the mail after your visit with us. Thank you!             Your Updated Medication List - Protect others around you: Learn how to safely use, store and throw away your medicines at www.disposemymeds.org.          This list is accurate as of: 5/18/17  4:35 PM.  Always use your most recent med list.                   Brand Name Dispense Instructions for use    acetaminophen 500 MG tablet    TYLENOL    180 tablet    Take 2 tablets (1,000 mg) by mouth 2 times daily       atorvastatin 80 MG tablet    LIPITOR    90 tablet    Take 1 tablet (80 mg) by mouth daily       BLOOD GLUCOSE METER DISPOSABLE Joyce     1 each    Dispense per preferred. Test sugars as needed up to 1 per day. Dx250.0 Strips and lancets #90       blood glucose monitoring test strip    no brand specified    100 strip    Use to test blood sugars 1-2 times daily or as directed       cyclobenzaprine 10 MG tablet    FLEXERIL    90 tablet    Take 1 tablet (10 mg) by mouth nightly as needed for muscle spasms       gabapentin 100 MG capsule    NEURONTIN    90 capsule    Take 1 capsule (100 mg) by mouth At Bedtime       metFORMIN 500 MG tablet    GLUCOPHAGE    360 tablet    Take 2 tablets (1,000 mg) by mouth 2 times daily (with meals)       order for DME     1 Device    Electronic blood pressure cuff - check blood pressure 2-3 times  weekly       polyethylene glycol powder    MIRALAX    510 g    Take 17 g (1 capful) by mouth daily       senna-docusate 8.6-50 MG per tablet    SENOKOT-S;PERICOLACE    180 tablet    Take 1 tablet by mouth 2 times daily

## 2017-05-18 NOTE — PATIENT INSTRUCTIONS
Keep up the GREAT work with weight loss!!    Make sure to take some time for yourself with all the stress in your life.    Check blood sugars 1x per week. Check blood pressure 2x's per week    Follow up in 3 months.

## 2017-05-18 NOTE — PROGRESS NOTES
HPI:   Sushila Flores is a 56 year old  female with PMH of sciatica and newly diagnosed diabetes who presents for follow up. Started on atorvastatin and metformin last month with new diagnosis of diabetes, tolerated well without side effects. Has zero dollars to  medications now, but anticipated having in the next few days. Notes that she has been checking blood sugars occasionally, usually 70-80. Notes high of 120 after steroid knee injections and 118 after eating [employer]. Has continued to work on dietary changes -- still not drinking pop. Is down 5 pounds from last visit!    Stress at home lately. Mom has been in hospital twice in last month, brother diagnosed with sarcoidosis, and daughter is struggling with depression. Notes she is urinating less and has pain with urination, thinks she hasn't been taking good care of herself and drinking enough water. Notes this pain occurs when stressed over her lifetime, current episode for a few days. Has a good friend that she talks to daily who provides good support for her.    Has been doing accupuncture for back pain. Really enjoying this.     Has colonoscopy scheduled for July 10th after +FIT test.         PMHX:     Patient Active Problem List   Diagnosis     Sciatic nerve pain     Vitamin D deficiency     Chronic back pain     Bilateral carpal tunnel syndrome     Osteoarthritis of multiple joints     Pap smear for cervical cancer screening     Spondylosis of cervical region without myelopathy or radiculopathy     BMI 36.0-36.9,adult     Candidate for statin therapy due to risk of future cardiovascular event     Type 2 diabetes mellitus without complication (H)     CKD (chronic kidney disease) stage 2, GFR 60-89 ml/min     Positive FIT (fecal immunochemical test)       Current Outpatient Prescriptions   Medication Sig Dispense Refill     order for DME Electronic blood pressure cuff - check blood pressure 2-3 times weekly 1 Device 0     blood glucose  monitoring (NO BRAND SPECIFIED) test strip Use to test blood sugars 1-2 times daily or as directed 100 strip 3     Blood Gluc Meter Disp-Strips (BLOOD GLUCOSE METER DISPOSABLE) MARCIANO Dispense per preferred. Test sugars as needed up to 1 per day. Dx250.0 Strips and lancets #90 1 each 0     atorvastatin (LIPITOR) 80 MG tablet Take 1 tablet (80 mg) by mouth daily 90 tablet 3     metFORMIN (GLUCOPHAGE) 500 MG tablet Take 2 tablets (1,000 mg) by mouth 2 times daily (with meals) 360 tablet 3     cyclobenzaprine (FLEXERIL) 10 MG tablet Take 1 tablet (10 mg) by mouth nightly as needed for muscle spasms 90 tablet 0     gabapentin (NEURONTIN) 100 MG capsule Take 1 capsule (100 mg) by mouth At Bedtime 90 capsule 0     senna-docusate (SENOKOT-S;PERICOLACE) 8.6-50 MG per tablet Take 1 tablet by mouth 2 times daily 180 tablet 1     polyethylene glycol (MIRALAX) powder Take 17 g (1 capful) by mouth daily 510 g 1     acetaminophen (TYLENOL) 500 MG tablet Take 2 tablets (1,000 mg) by mouth 2 times daily (Patient not taking: Reported on 3/17/2017) 180 tablet 0       Family History   Problem Relation Age of Onset     DIABETES Brother      DIABETES Maternal Grandmother      Hypertension Sister      Other Cancer Maternal Grandfather      Breast Cancer Maternal Aunt      Breast Cancer Paternal Aunt      Other Cancer Paternal Grandmother      stomach cancer     Asthma No family hx of        Social History   Substance Use Topics     Smoking status: Former Smoker     Types: Cigarettes     Quit date: 8/1/2013     Smokeless tobacco: Never Used     Alcohol use 0.0 oz/week     0 Standard drinks or equivalent per week      Comment: ocassionally       Social History     Social History Narrative    Works at [employer]. 4 children, one set of twins. 3 grandkids.       Allergies   Allergen Reactions     Amoxicillin Swelling     Penicillin G Nausea and Vomiting     Percocet [Oxycodone-Acetaminophen]      Vicodin  "[Hydrocodone-Acetaminophen]        Results for orders placed or performed in visit on 05/18/17 (from the past 24 hour(s))   Urinalysis, Micro If ([facility])   Result Value Ref Range    Specific Gravity Urine <=1.005 1.005 - 1.030    pH Urine 5.5 4.5 - 8.0    Leukocyte Esterase UR Negative NEGATIVE    Nitrite Urine Negative NEGATIVE    Protein UR Negative NEGATIVE    Glucose Urine Negative NEGATIVE    Ketones Urine Negative NEGATIVE    Urobilinogen mg/dL 0.2 E.U./dL 0.2 E.U./dL    Bilirubin UR Negative NEGATIVE    Blood UR Trace-intact (A) NEGATIVE    Color Urine Yellow     Clarity, urine Clear    Urine Microscopic ([facility])   Result Value Ref Range    WBC Urine None <5 /hpf    RBC Urine <2 <5 /hpf    Epithelial Cells UR 5-10 0 - 2 /lpf    Mucous Urine None NONE lpf    Casts Urine None NONE /lpf    Crystal Urine None NONE /lpf    Bacteria Wet Prep Few None            Physical Exam:     Vitals:    05/18/17 1539   BP: (!) 148/109   Pulse: 97   Resp: 16   Temp: 98  F (36.7  C)   TempSrc: Oral   SpO2: 99%   Weight: 204 lb (92.5 kg)   Height: 5' 3.6\" (161.5 cm)     Body mass index is 35.46 kg/(m^2).    GENERAL APPEARANCE: healthy, alert and no distress  EYES: Eyes grossly normal to inspection  RESP: lungs clear to auscultation - no rales, rhonchi or wheezes  CV: regular rate and rhythm, and no murmur, click, rub or gallop  ABDOMEN: soft, nontender, without hepatosplenomegaly or masses, active bowel sounds  MS: extremities normal- no gross deformities noted, ambulating normally  SKIN: no suspicious lesions or rashes  PSYCH: mood and affect normal/bright    Assessment and Plan   1. Type 2 diabetes mellitus without complication, without long-term current use of insulin (H)  Tolerating metformin well. Discussed checking blood sugar 1x per week. On ASA, statin.  - Microalbumin Creatinine Ratio Random Ur ([facility])  - order for DME; Electronic blood pressure cuff - check blood pressure 2-3 times weekly  Dispense: 1 Device; " Refill: 0  - blood glucose monitoring (NO BRAND SPECIFIED) test strip; Use to test blood sugars 1-2 times daily or as directed  Dispense: 100 strip; Refill: 3    2. Elevated blood pressure reading without diagnosis of hypertension  BP elevated today. Lots of stress lately, relates to this. Will write for BP cuff for home to check 2-3x's per week at home and bring #'s to next visit.     3. BMI 36.0-36.9,adult  Congratulated on weight loss. Down 5 pounds from last visit!    4. Dysuria  Symptoms less suggestive of UTI. She relates to stress and not drinking enough fluids -- will work on this.  - Urinalysis, Micro If ([facility])  - Urine Microscopic ([facility])    Follow up in 3 months for DM follow up and BP recheck, sooner if issues arise.    Options for treatment and follow-up care were reviewed with the patient and/or guardian. Sushila Flores and/or guardian engaged in the decision making process and verbalized understanding of the options discussed and agreed with the final plan.    Dipti Self MD  [facility] Medicine Resident  Pager# [phone]    Precepted with: Isabel Kendrick MD

## 2017-05-21 NOTE — PROGRESS NOTES
Preceptor Attestation:  Patient's case reviewed and discussed with Dipti Self MD.  Patient seen and discussed with the resident.  I agree with assessment and plan of care.  Supervising Physician:  Isabel Kendrick MD  [facility]

## 2017-05-30 ENCOUNTER — MYC MEDICAL ADVICE (OUTPATIENT)
Dept: FAMILY MEDICINE | Facility: CLINIC | Age: 57
End: 2017-05-30

## 2017-06-05 ENCOUNTER — OFFICE VISIT (OUTPATIENT)
Dept: FAMILY MEDICINE | Facility: CLINIC | Age: 57
End: 2017-06-05

## 2017-06-05 ENCOUNTER — AMBULATORY - HEALTHEAST (OUTPATIENT)
Dept: SCHEDULING | Facility: CLINIC | Age: 57
End: 2017-06-05

## 2017-06-05 VITALS
BODY MASS INDEX: 33.52 KG/M2 | HEART RATE: 91 BPM | DIASTOLIC BLOOD PRESSURE: 73 MMHG | HEIGHT: 65 IN | OXYGEN SATURATION: 100 % | WEIGHT: 201.2 LBS | SYSTOLIC BLOOD PRESSURE: 108 MMHG | TEMPERATURE: 98.3 F

## 2017-06-05 DIAGNOSIS — E11.9 DIABETES MELLITUS (H): ICD-10-CM

## 2017-06-05 DIAGNOSIS — K59.00 CONSTIPATION, UNSPECIFIED CONSTIPATION TYPE: Primary | ICD-10-CM

## 2017-06-05 LAB — LIPASE SERPL-CCNC: 48 U/L (ref 0–52)

## 2017-06-05 RX ORDER — WHEAT DEXTRIN 3 G/3.8 G
POWDER (GRAM) ORAL
Qty: 500 G | Refills: 3 | Status: SHIPPED | OUTPATIENT
Start: 2017-06-05 | End: 2018-05-30

## 2017-06-05 NOTE — MR AVS SNAPSHOT
After Visit Summary   6/5/2017    Sushila Flores    MRN: [id number]           Patient Information     Date Of Birth          1960        Visit Information        Provider Department      6/5/2017 10:00 AM Katerine Nathan MD [facility]        Today's Diagnoses     Constipation, unspecified constipation type    -  1    Diabetes mellitus (H)          Care Instructions    We sent a prescription in benefiber that you should take everyday.             Follow-ups after your visit        Additional Services     NUTRITION REFERRAL       Patient prefers to be called    Reason for Referral: nutriton       needed: No  Language: English    May leave message on voicemail: No    ([facility] Only) Referral should be tracked (Yes/No)?                  Who to contact     Please call your clinic at [phone] to:    Ask questions about your health    Make or cancel appointments    Discuss your medicines    Learn about your test results    Speak to your doctor   If you have compliments or concerns about an experience at your clinic, or if you wish to file a complaint, please contact [facility] Patient Relations at [phone] or email us at [email or web address]         Additional Information About Your Visit        MyChart Information     [employer] gives you secure access to your electronic health record. If you see a primary care provider, you can also send messages to your care team and make appointments. If you have questions, please call your primary care clinic.  If you do not have a primary care provider, please call [phone] and they will assist you.      [email or web address] is an electronic gateway that provides easy, online access to your medical records. With [email or web address], you can request a clinic appointment, read your test results, renew a prescription or communicate with your care team.     To access your existing account, please contact your  "[facility] or call [phone] for assistance.        Care EveryWhere ID     This is your Care EveryWhere ID. This could be used by other organizations to access your [place] medical records  [id number]        Your Vitals Were     Pulse Temperature Height Pulse Oximetry BMI (Body Mass Index)       91 98.3  F (36.8  C) (Oral) 5' 4.5\" (163.8 cm) 100% 34 kg/m2        Blood Pressure from Last 3 Encounters:   06/05/17 108/73   05/18/17 (!) 148/109   04/13/17 130/82    Weight from Last 3 Encounters:   06/05/17 201 lb 3.2 oz (91.3 kg)   05/18/17 204 lb (92.5 kg)   04/13/17 209 lb 9.6 oz (95.1 kg)              We Performed the Following     Lipase ([facility])     NUTRITION REFERRAL          Today's Medication Changes          These changes are accurate as of: 6/5/17 10:40 AM.  If you have any questions, ask your nurse or doctor.               Start taking these medicines.        Dose/Directions    BENEFIBER Powd   Used for:  Constipation, unspecified constipation type   Started by:  Katerine Nathan MD        Take one capful in anything you like to drink once a day   Quantity:  500 g   Refills:  3            Where to get your medicines      These medications were sent to [id number] - [place], [place] - [address], [place] [address]     Phone:  [phone]     KD Zuniga                Primary Care Provider Office Phone # Fax #    Diptijose m Self [phone]       [facility] [address] [place] [address]        Thank you!     Thank you for choosing [facility]  for your care. Our goal is always to provide you with excellent care. Hearing back from our patients is one way we can continue to improve our services. Please take a few minutes to complete the written survey that you may receive in the mail after your visit with us. Thank you!             Your Updated Medication List - Protect " others around you: Learn how to safely use, store and throw away your medicines at www.disposemymeds.org.          This list is accurate as of: 6/5/17 10:40 AM.  Always use your most recent med list.                   Brand Name Dispense Instructions for use    acetaminophen 500 MG tablet    TYLENOL    180 tablet    Take 2 tablets (1,000 mg) by mouth 2 times daily       atorvastatin 80 MG tablet    LIPITOR    90 tablet    Take 1 tablet (80 mg) by mouth daily       BENEFIBER Powd     500 g    Take one capful in anything you like to drink once a day       BLOOD GLUCOSE METER DISPOSABLE Joyce     1 each    Dispense per preferred. Test sugars as needed up to 1 per day. Dx250.0 Strips and lancets #90       blood glucose monitoring test strip    no brand specified    100 strip    Use to test blood sugars 1-2 times daily or as directed       cyclobenzaprine 10 MG tablet    FLEXERIL    90 tablet    Take 1 tablet (10 mg) by mouth nightly as needed for muscle spasms       gabapentin 100 MG capsule    NEURONTIN    90 capsule    Take 1 capsule (100 mg) by mouth At Bedtime       metFORMIN 500 MG tablet    GLUCOPHAGE    360 tablet    Take 2 tablets (1,000 mg) by mouth 2 times daily (with meals)       order for DME     1 Device    Electronic blood pressure cuff - check blood pressure 2-3 times weekly       polyethylene glycol powder    MIRALAX    510 g    Take 17 g (1 capful) by mouth daily       senna-docusate 8.6-50 MG per tablet    SENOKOT-S;PERICOLACE    180 tablet    Take 1 tablet by mouth 2 times daily

## 2017-06-05 NOTE — PROGRESS NOTES
HPI:       Sushila Flores is a 56 year old  female who presents to address the following concerns:    SUBJECTIVE:  Patient presents for emergency department follow-up today.  She has a recent diagnosis of diabetes.  This is type 2.  She has a family history of diabetes mellitus type 2 and reports that some family members have had complications as amputations.  She is very motivated for this to not happen to her.     1.  Emergency department follow-up.  Patient reports that she was having some stomach pain and was seen in the end of the month at [facility] and was told that she was a bit dehydrated given fluid.  At that time her lipase was elevated at 93, but CT of the abdomen was normal and she did not have any significant right upper quadrant pain.  She was discharged without incident.  Initial lactate was also elevated but returned to normal before discharge.  Since this she reports that she has been feeling well.  She has been on this date with her daughter who does not cook as often and she has noticed a change in her diet which has affected her constipation.   2.  Chronic constipation.  The patient reports that new medications for diabetes are causing constipation and that she needs to take MiraLax as needed for bowel movements.  Since the change in her diet she has also noted some harder stools.  She is on a daily fiber supplement.  She reports that when she lives with her other daughter she does have more consistent soft bowels without needing the MiraLax.  She is not on a daily medication for this.  She uses Senna and docusate as well as MiraLax as needed.  No bloody stools.   3.  Diabetes mellitus type 2 diagnosed in March of this year, reports taking medications as prescribed, including the 1000 mg of metformin twice daily.  Since her diagnosis she has also lost 10 pounds.  I congratulated her on this.   4.  Desire for a nutritionist.  Patient reports that given diabetes and overweight status she would  like to see a nutritionist.  She has been doing her best but would like more information and education.  She is very motivated.     Family hx DM II    Lost 10 lb since March after dx DM         PMHX:     Patient Active Problem List   Diagnosis     Sciatic nerve pain     Vitamin D deficiency     Chronic back pain     Bilateral carpal tunnel syndrome     Osteoarthritis of multiple joints     Pap smear for cervical cancer screening     Spondylosis of cervical region without myelopathy or radiculopathy     BMI 36.0-36.9,adult     Candidate for statin therapy due to risk of future cardiovascular event     Type 2 diabetes mellitus without complication (H)     CKD (chronic kidney disease) stage 2, GFR 60-89 ml/min     Positive FIT (fecal immunochemical test)       Current Outpatient Prescriptions   Medication Sig Dispense Refill     order for DME Electronic blood pressure cuff - check blood pressure 2-3 times weekly 1 Device 0     blood glucose monitoring (NO BRAND SPECIFIED) test strip Use to test blood sugars 1-2 times daily or as directed 100 strip 3     Blood Gluc Meter Disp-Strips (BLOOD GLUCOSE METER DISPOSABLE) MARCIANO Dispense per preferred. Test sugars as needed up to 1 per day. Dx250.0 Strips and lancets #90 1 each 0     atorvastatin (LIPITOR) 80 MG tablet Take 1 tablet (80 mg) by mouth daily 90 tablet 3     metFORMIN (GLUCOPHAGE) 500 MG tablet Take 2 tablets (1,000 mg) by mouth 2 times daily (with meals) 360 tablet 3     cyclobenzaprine (FLEXERIL) 10 MG tablet Take 1 tablet (10 mg) by mouth nightly as needed for muscle spasms 90 tablet 0     gabapentin (NEURONTIN) 100 MG capsule Take 1 capsule (100 mg) by mouth At Bedtime 90 capsule 0     senna-docusate (SENOKOT-S;PERICOLACE) 8.6-50 MG per tablet Take 1 tablet by mouth 2 times daily 180 tablet 1     polyethylene glycol (MIRALAX) powder Take 17 g (1 capful) by mouth daily 510 g 1     acetaminophen (TYLENOL) 500 MG tablet Take 2 tablets (1,000 mg) by mouth 2 times  "daily (Patient not taking: Reported on 3/17/2017) 180 tablet 0          Allergies   Allergen Reactions     Amoxicillin Swelling     Penicillin G Nausea and Vomiting     Percocet [Oxycodone-Acetaminophen]      Vicodin [Hydrocodone-Acetaminophen]        No results found for this or any previous visit (from the past 24 hour(s)).    Current Outpatient Prescriptions   Medication     order for DME     blood glucose monitoring (NO BRAND SPECIFIED) test strip     Blood Gluc Meter Disp-Strips (BLOOD GLUCOSE METER DISPOSABLE) MARCIANO     atorvastatin (LIPITOR) 80 MG tablet     metFORMIN (GLUCOPHAGE) 500 MG tablet     cyclobenzaprine (FLEXERIL) 10 MG tablet     gabapentin (NEURONTIN) 100 MG capsule     senna-docusate (SENOKOT-S;PERICOLACE) 8.6-50 MG per tablet     polyethylene glycol (MIRALAX) powder     acetaminophen (TYLENOL) 500 MG tablet     No current facility-administered medications for this visit.               Review of Systems:   ROS as described above.  Denies F/S/C/N/V/SOB/CP          Physical Exam:     Vitals:    06/05/17 1002   BP: 108/73   Pulse: 91   Temp: 98.3  F (36.8  C)   TempSrc: Oral   SpO2: 100%   Weight: 201 lb 3.2 oz (91.3 kg)   Height: 5' 4.5\" (163.8 cm)     Body mass index is 34 kg/(m^2).     GEN: patient sitting comfortably in NAD.  obese  HEEN: Head is atraumatic, normocephalic, eyes anicteric, mucous membranes moist  CV: RRR w/o M/R/G  PULM: CTAB without w/r/r  ABD: soft, nontender, bowel sounds present, no masses  NEURO: Alert and oriented x3.  No focal motor abnormalities.  Face symmetric.  PSYCH: appropriate  SKIN: No rashes, bruising, or other lesions    Results for orders placed or performed in visit on 06/05/17   Lipase (Circle)   Result Value Ref Range    Lipase 48 0 - 52 U/L    Narrative    Test performed by:  [facility]  [address] [place] [address]         Assessment and Plan     ASSESSMENT AND PLAN:  This is a 56-year-old female with new diagnosed Diabetes type 2 " with recent visit to the emergency department for abdominal pain.   1.  Abdominal pain.  I will recheck lipase today to ensure that this has returned to normal.  No changes as patient is asymptomatic currently.  Encouraged regular soft bowel movements.   2.  Constipation added Benefiber to the regimen, MiraLax as needed, Senna and docusate.  Drink plenty of fluids.  Continue to work on healthy diet.     3.  The patient is overwhelmed by all the new things she is having to do.  I thought about giving her a list of high fiber foods; however, she is having trouble with feeling overwhelmed about all the new things she needs to do.  We will stick with Benefiber for now.   4.  Diabetes mellitus type 2.  She is taking no medications as prescribed congratulated her on this and congratulated her on weight loss.   5.  Obesity and recent weight loss of approximately 11 pounds since March.  I congratulated and encouraged to continue with this trajectory.  She will see a nutritionist and an appointment will be made today.     6.  The patient should follow up in 1 month with her new primary care provider.  This would be best if done after her visit with a nutritionist.     Options for treatment and follow-up care were reviewed with the patient and/or guardian. Sushila Flores and/or guardian engaged in the decision making process and verbalized understanding of the options discussed and agreed with the final plan.    Katerine Nathan MD

## 2017-06-05 NOTE — PATIENT INSTRUCTIONS
We sent a prescription in benefiber that you should take everyday.       Referral for Nutrition faxed to  Central Scheduling and pt will be contacted to schedule appointment.

## 2017-08-08 ENCOUNTER — TELEPHONE (OUTPATIENT)
Dept: FAMILY MEDICINE | Facility: CLINIC | Age: 57
End: 2017-08-08

## 2017-08-08 NOTE — TELEPHONE ENCOUNTER
[facility] phone call message- general phone call:    Reason for call: Yoly is calling and stated that they received an order for a BP Monitor back on 05/18, and she states that the reason says it for Diabetic reasons, and that they haven't done anything with the order due to Improper Diagnosis for a BP Monitor. Please call, and advise.     Reference number:      Return call needed: Yes    OK to leave a message on voice mail? Yes    Primary language: English      needed? No    Call taken on August 8, 2017 at 2:53 PM by Toña Timmons

## 2017-08-14 NOTE — TELEPHONE ENCOUNTER
I called and discussed with Yoly of [facility].     The diagnosis to be associated with the BP cuff is: Elevated Blood Pressure without diagnosis of hypertension, as documented in the Office Visit note by Sherrell Self on 5/18/2017.    Yoly needs a copy of that visit note to be faxed over to [facility], the number is  [phone].    Thanks

## 2017-09-01 ENCOUNTER — MEDICAL CORRESPONDENCE (OUTPATIENT)
Dept: HEALTH INFORMATION MANAGEMENT | Facility: CLINIC | Age: 57
End: 2017-09-01

## 2017-09-03 PROBLEM — Z12.11 SPECIAL SCREENING FOR MALIGNANT NEOPLASMS, COLON: Status: ACTIVE | Noted: 2017-09-03

## 2017-10-18 ENCOUNTER — OFFICE VISIT (OUTPATIENT)
Dept: FAMILY MEDICINE | Facility: CLINIC | Age: 57
End: 2017-10-18

## 2017-10-18 VITALS
TEMPERATURE: 98.1 F | HEART RATE: 88 BPM | DIASTOLIC BLOOD PRESSURE: 84 MMHG | RESPIRATION RATE: 16 BRPM | HEIGHT: 64 IN | OXYGEN SATURATION: 100 % | SYSTOLIC BLOOD PRESSURE: 126 MMHG | BODY MASS INDEX: 33.46 KG/M2 | WEIGHT: 196 LBS

## 2017-10-18 DIAGNOSIS — Z13.9 SCREENING FOR CONDITION: ICD-10-CM

## 2017-10-18 DIAGNOSIS — K21.9 GASTROESOPHAGEAL REFLUX DISEASE, ESOPHAGITIS PRESENCE NOT SPECIFIED: Primary | ICD-10-CM

## 2017-10-18 DIAGNOSIS — E11.9 TYPE 2 DIABETES MELLITUS WITHOUT COMPLICATION, WITHOUT LONG-TERM CURRENT USE OF INSULIN (H): ICD-10-CM

## 2017-10-18 LAB
HBA1C MFR BLD: 6.1 % (ref 4.1–5.7)
TSH SERPL DL<=0.05 MIU/L-ACNC: 0.51 UIU/ML (ref 0.3–5)

## 2017-10-18 RX ORDER — MAGNESIUM HYDROXIDE/ALUMINUM HYDROXICE/SIMETHICONE 120; 1200; 1200 MG/30ML; MG/30ML; MG/30ML
30 SUSPENSION ORAL EVERY 4 HOURS PRN
Qty: 148 ML | Refills: 0 | Status: SHIPPED | OUTPATIENT
Start: 2017-10-18 | End: 2019-04-05

## 2017-10-18 NOTE — LETTER
October 20, 2017      Sushila Flores  GENERAL DELIVERY [place] [address]        Dear Sushila,  Your lab tests show normal thyroid function and good control of your diabetes! We will talk more at your next visit in 2 weeks. Call with any questions or concerns.   Please see below for your test results.    Resulted Orders   Hemoglobin A1c ([facility])   Result Value Ref Range    Hemoglobin A1C 6.1 (H) 4.1 - 5.7 %   TSH  Sensitive ([facility])   Result Value Ref Range    TSH 0.51 0.30 - 5.00 uIU/mL    Narrative    Test performed by:  [facility]  [address] [place] [address]       If you have any questions, please call the clinic to make an appointment.    Sincerely,    Noemi Coughlin MD

## 2017-10-18 NOTE — PROGRESS NOTES
"S:  Sushila Flores is a 56 year old year old female who  has a past medical history of Bilateral carpal tunnel syndrome (12/24/2015); Chronic back pain (8/20/2014); and Osteoarthritis (12/24/2015). who presents to discuss:    Abdominal pain:  -previously told she had gastritis or ulcer  -this feels similar  -has been taking ibuprofen for past week  -vomiting, acid  -took maalox last night and was able to sleep  -was seen by GI last year, told she had stomach acid damage   -given GI cocktail with immediate relief in clinic    Complete ROS otherwise negative.     O:  Vitals: /84  Pulse 88  Temp 98.1  F (36.7  C) (Oral)  Resp 16  Ht 5' 3.5\" (161.3 cm)  Wt 196 lb (88.9 kg)  SpO2 100%  BMI 34.18 kg/m2    General: Appears fatigued  HEENT: PERRL, moist oral mucus membranes  Pulm: CTA BL, no tachypnea  CV: RRR, no murmur  Abd: soft, + epigastric tenderness, no guarding no rebound  Ext: Warm, well perfused, no LE edema  Skin: No rash on limited skin exam  Psych: Mood appropriate to visit content, full affect, rational thought content and process      A/P:    1. Gastroesophageal reflux disease, esophagitis presence not specified. Exacerbated by NSAID use. Discussed red flag symptoms and when to go to ER. Will check H pylori and have her follow up in 2 weeks.  - omeprazole (PRILOSEC) 20 MG CR capsule; Take 1 capsule (20 mg) by mouth daily  Dispense: 30 capsule; Refill: 1  - alum & mag hydroxide-simethicone (MYLANTA/MAALOX) 200-200-20 MG/5ML SUSP suspension; Take 30 mLs by mouth every 4 hours as needed for indigestion  Dispense: 148 mL; Refill: 0  - lidocaine (viscous) 15 mL, alum & mag hydroxide-simethicone 15 mL GI Cocktail; 30 mLs by Oral or Feeding Tube route once for 1 dose  Dispense: 30 mL; Refill: 0  - ranitidine (ZANTAC) 150 MG tablet; Take 1 tablet (150 mg) by mouth 2 times daily  Dispense: 60 tablet; Refill: 1  - H. Pylori Agn Fecal ([facility]); Future    2. Type 2 diabetes mellitus without complication, " without long-term current use of insulin (H)- follow up in 2 weeks to further discuss DM  - Hemoglobin A1c ([facility])    3. Screening for condition  - TSH  Sensitive ([facility])    Noemi Coughlin MD  [facility]  Pager     Precepted with: Isabel Kendrick MD

## 2017-10-18 NOTE — NURSING NOTE
The following medication was given:     MEDICATION: antacid  ROUTE: PO  SITE: mouth  DOSE: 15ML   LOT #: [id number]  :  [employer] KKjamie  EXPIRATION DATE:  1/2019  NDC#: 89605-112-24     The remainder 340ML of 355ML was discarded as unavoidable waste.    Letha Tang MA     The following medication was given:     MEDICATION: Lidocain viscous  ROUTE: PO  SITE: mouth  DOSE: 15mL  LOT #: [id number]  :  [employer]  EXPIRATION DATE:  5/2018  NDC#: 5897-4114-30     The remainder 85ML of 100ML was discarded as unavoidable waste.    Letha Tang MA

## 2017-10-18 NOTE — PATIENT INSTRUCTIONS
- stop using ibuprofen  -take omeprazole and zantac for acid  -do stool test for H pylori  -call/go to ER  If you see any blood in vomit  -follow up in 2 weeks to check in and talk about diabetes

## 2017-10-18 NOTE — MR AVS SNAPSHOT
After Visit Summary   10/18/2017    Sushila Flores    MRN: [id number]           Patient Information     Date Of Birth          1960        Visit Information        Provider Department      10/18/2017 4:00 PM Noemi Coughlin MD [facility]        Today's Diagnoses     Gastroesophageal reflux disease, esophagitis presence not specified    -  1    Type 2 diabetes mellitus without complication, without long-term current use of insulin (H)        Screening for condition          Care Instructions    - stop using ibuprofen  -take omeprazole and zantac for acid  -do stool test for H pylori  -call/go to ER  If you see any blood in vomit  -follow up in 2 weeks to check in and talk about diabetes          Follow-ups after your visit        Follow-up notes from your care team     Return in about 2 years (around 10/18/2019).      Your next 10 appointments already scheduled     Nov 08, 2017  2:40 PM CST   Return Visit with Noemi Coughlin MD   [facility] ([facility])    [address] [place] [address]   [phone]              Future tests that were ordered for you today     Open Future Orders        Priority Expected Expires Ordered    H. Pylori Agn Fecal ([facility]) Routine  10/31/2017 10/18/2017            Who to contact     Please call your clinic at [phone] to:    Ask questions about your health    Make or cancel appointments    Discuss your medicines    Learn about your test results    Speak to your doctor   If you have compliments or concerns about an experience at your clinic, or if you wish to file a complaint, please contact [facility] Patient Relations at [phone] or email us at [email or web address]         Additional Information About Your Visit        MyChart Information     [employer] gives you secure access to your electronic health record. If you see a primary care provider, you can also send messages to your  "care team and make appointments. If you have questions, please call your primary care clinic.  If you do not have a primary care provider, please call [phone] and they will assist you.      [employer] is an electronic gateway that provides easy, online access to your medical records. With [employer], you can request a clinic appointment, read your test results, renew a prescription or communicate with your care team.     To access your existing account, please contact your [facility] or call [phone] for assistance.        Care EveryWhere ID     This is your Care EveryWhere ID. This could be used by other organizations to access your [facility] medical records  [id number]        Your Vitals Were     Pulse Temperature Respirations Height Pulse Oximetry BMI (Body Mass Index)    88 98.1  F (36.7  C) (Oral) 16 5' 3.5\" (161.3 cm) 100% 34.18 kg/m2       Blood Pressure from Last 3 Encounters:   10/18/17 126/84   06/05/17 108/73   05/18/17 (!) 148/109    Weight from Last 3 Encounters:   10/18/17 196 lb (88.9 kg)   06/05/17 201 lb 3.2 oz (91.3 kg)   05/18/17 204 lb (92.5 kg)              We Performed the Following     C FOOT EXAM  NO CHARGE     Hemoglobin A1c ([facility])     TSH  Sensitive ([facility])          Today's Medication Changes          These changes are accurate as of: 10/18/17  5:05 PM.  If you have any questions, ask your nurse or doctor.               Start taking these medicines.        Dose/Directions    alum & mag hydroxide-simethicone 200-200-20 MG/5ML Susp suspension   Commonly known as:  MYLANTA/MAALOX   Used for:  Gastroesophageal reflux disease, esophagitis presence not specified   Started by:  Noemi Coughlin MD        Dose:  30 mL   Take 30 mLs by mouth every 4 hours as needed for indigestion   Quantity:  148 mL   Refills:  0       lidocaine (viscous) 15 mL, alum & mag hydroxide-simethicone 15 mL GI Cocktail   Used for:  Gastroesophageal reflux disease, esophagitis " presence not specified   Started by:  Noemi Coughlin MD        Dose:  30 mL   30 mLs by Oral or Feeding Tube route once for 1 dose   Quantity:  30 mL   Refills:  0       omeprazole 20 MG CR capsule   Commonly known as:  priLOSEC   Used for:  Gastroesophageal reflux disease, esophagitis presence not specified   Started by:  Noemi Coughlin MD        Dose:  20 mg   Take 1 capsule (20 mg) by mouth daily   Quantity:  30 capsule   Refills:  1       ranitidine 150 MG tablet   Commonly known as:  ZANTAC   Used for:  Gastroesophageal reflux disease, esophagitis presence not specified   Started by:  Noemi Coughlin MD        Dose:  150 mg   Take 1 tablet (150 mg) by mouth 2 times daily   Quantity:  60 tablet   Refills:  1            Where to get your medicines      These medications were sent to [id number] - [place], [place] - [address], [place] [address]     Phone:  [phone]     alum & mag hydroxide-simethicone 200-200-20 MG/5ML Susp suspension    omeprazole 20 MG CR capsule    ranitidine 150 MG tablet         Some of these will need a paper prescription and others can be bought over the counter.  Ask your nurse if you have questions.     Bring a paper prescription for each of these medications     lidocaine (viscous) 15 mL, alum & mag hydroxide-simethicone 15 mL GI Cocktail                Primary Care Provider Office Phone # Fax #    Noemi Coughlin [phone]       [facility] [address] [place] [address]        Equal Access to Services     SAÚL DOVER AH: Hadii eugenia Ramos, waaxda lumartinadaha, qaybta kaalmada myriam, rosalia daniels. So [facility] [phone].    ATENCIÓN: Si habla español, tiene a harris disposición servicios gratuitos de asistencia lingüística. Llame al [phone].    We comply with applicable federal civil rights laws and [place] laws. We do not discriminate on the basis of race, color, national  origin, age, disability, sex, sexual orientation, or gender identity.            Thank you!     Thank you for choosing [facility]  for your care. Our goal is always to provide you with excellent care. Hearing back from our patients is one way we can continue to improve our services. Please take a few minutes to complete the written survey that you may receive in the mail after your visit with us. Thank you!             Your Updated Medication List - Protect others around you: Learn how to safely use, store and throw away your medicines at www.disposemymeds.org.          This list is accurate as of: 10/18/17  5:05 PM.  Always use your most recent med list.                   Brand Name Dispense Instructions for use Diagnosis    acetaminophen 500 MG tablet    TYLENOL    180 tablet    Take 2 tablets (1,000 mg) by mouth 2 times daily    Sciatic nerve pain, left       alum & mag hydroxide-simethicone 200-200-20 MG/5ML Susp suspension    MYLANTA/MAALOX    148 mL    Take 30 mLs by mouth every 4 hours as needed for indigestion    Gastroesophageal reflux disease, esophagitis presence not specified       atorvastatin 80 MG tablet    LIPITOR    90 tablet    Take 1 tablet (80 mg) by mouth daily    Type 2 diabetes mellitus without complication, without long-term current use of insulin (H), Candidate for statin therapy due to risk of future cardiovascular event       BENEFIBER Powd     500 g    Take one capful in anything you like to drink once a day    Constipation, unspecified constipation type       BLOOD GLUCOSE METER DISPOSABLE Joyce     1 each    Dispense per preferred. Test sugars as needed up to 1 per day. Dx250.0 Strips and lancets #90    Type 2 diabetes mellitus without complication, without long-term current use of insulin (H)       blood glucose monitoring test strip    no brand specified    100 strip    Use to test blood sugars 1-2 times daily or as directed    Type 2 diabetes mellitus without complication,  without long-term current use of insulin (H)       cyclobenzaprine 10 MG tablet    FLEXERIL    90 tablet    Take 1 tablet (10 mg) by mouth nightly as needed for muscle spasms    Sciatic nerve pain, left       gabapentin 100 MG capsule    NEURONTIN    90 capsule    Take 1 capsule (100 mg) by mouth At Bedtime    Sciatic nerve pain, left       lidocaine (viscous) 15 mL, alum & mag hydroxide-simethicone 15 mL GI Cocktail     30 mL    30 mLs by Oral or Feeding Tube route once for 1 dose    Gastroesophageal reflux disease, esophagitis presence not specified       metFORMIN 500 MG tablet    GLUCOPHAGE    360 tablet    Take 2 tablets (1,000 mg) by mouth 2 times daily (with meals)    Type 2 diabetes mellitus without complication, without long-term current use of insulin (H)       omeprazole 20 MG CR capsule    priLOSEC    30 capsule    Take 1 capsule (20 mg) by mouth daily    Gastroesophageal reflux disease, esophagitis presence not specified       order for DME     1 Device    Electronic blood pressure cuff - check blood pressure 2-3 times weekly    Type 2 diabetes mellitus without complication, without long-term current use of insulin (H)       polyethylene glycol powder    MIRALAX    510 g    Take 17 g (1 capful) by mouth daily    Constipation, unspecified constipation type       ranitidine 150 MG tablet    ZANTAC    60 tablet    Take 1 tablet (150 mg) by mouth 2 times daily    Gastroesophageal reflux disease, esophagitis presence not specified       senna-docusate 8.6-50 MG per tablet    SENOKOT-S;PERICOLACE    180 tablet    Take 1 tablet by mouth 2 times daily    Constipation, unspecified constipation type

## 2017-10-23 DIAGNOSIS — K21.9 GASTROESOPHAGEAL REFLUX DISEASE, ESOPHAGITIS PRESENCE NOT SPECIFIED: ICD-10-CM

## 2017-10-24 LAB — H PYLORI AG STL QL IA: NEGATIVE

## 2017-10-30 NOTE — PROGRESS NOTES
Preceptor Attestation:  Patient's case reviewed and discussed with Noemi Coughlin MD.   Patient seen and discussed with the resident.  I agree with assessment and plan of care.  Supervising Physician:  Isabel Kendrick MD  [facility]

## 2017-11-08 ENCOUNTER — OFFICE VISIT (OUTPATIENT)
Dept: FAMILY MEDICINE | Facility: CLINIC | Age: 57
End: 2017-11-08

## 2017-11-08 VITALS
TEMPERATURE: 98.3 F | HEIGHT: 63 IN | DIASTOLIC BLOOD PRESSURE: 88 MMHG | SYSTOLIC BLOOD PRESSURE: 128 MMHG | OXYGEN SATURATION: 99 % | BODY MASS INDEX: 34.27 KG/M2 | WEIGHT: 193.4 LBS | HEART RATE: 88 BPM

## 2017-11-08 DIAGNOSIS — M54.32 SCIATIC NERVE PAIN, LEFT: ICD-10-CM

## 2017-11-08 DIAGNOSIS — E11.9 TYPE 2 DIABETES MELLITUS WITHOUT COMPLICATION, WITHOUT LONG-TERM CURRENT USE OF INSULIN (H): Primary | ICD-10-CM

## 2017-11-08 RX ORDER — GABAPENTIN 100 MG/1
300 CAPSULE ORAL 3 TIMES DAILY
Qty: 90 CAPSULE | Refills: 0 | Status: SHIPPED | OUTPATIENT
Start: 2017-11-08 | End: 2018-05-30

## 2017-11-08 RX ORDER — METFORMIN HCL 500 MG
500 TABLET, EXTENDED RELEASE 24 HR ORAL 2 TIMES DAILY WITH MEALS
Qty: 60 TABLET | Refills: 1 | Status: SHIPPED | OUTPATIENT
Start: 2017-11-08 | End: 2018-03-01

## 2017-11-08 NOTE — MR AVS SNAPSHOT
After Visit Summary   11/8/2017    Sushila Flores    MRN: [id number]           Patient Information     Date Of Birth          1960        Visit Information        Provider Department      11/8/2017 2:40 PM Noemi Coughlin MD [facility]        Today's Diagnoses     Type 2 diabetes mellitus without complication, without long-term current use of insulin (H)    -  1    Sciatic nerve pain, left          Care Instructions    Thanks for coming in today Sushila Flores!    Your diabetes is well controlled.   -We changed to a new type of metformin, start at 500mg once daily.   -Stop taking zantac and just use prilosec for GERD.   -Start up your PT exercises for your back pain  -After tolerating the new metformin, go ahead and increase your gabapentin to 300mg three times per day. This can make you sleepy so be careful the first time you take it.     Follow up in 2-4 weeks, at your convenience for back pain.    Your medication list is printed, please keep this with you, it is helpful to bring this current list to any other medical appointments, the emergency room or hospital.    If you had lab testing today and your results are reassuring or normal they will be be mailed to you within 7 days.     If the lab tests need quick action we will call you with the results.   The phone number we will call with results is # [phone] (home) . If this is not the best number please call our clinic and change the number.    If you need any refills please call your pharmacy and they will contact us.    If you have any further concerns or wish to schedule another appointment you must call our office during normal business hours  [phone] (8-5:00 M-F)  If you have urgent medical questions that cannot wait  you may also call [phone] at any time of day.  If you have a medical emergency please call [phone].    Thank you for coming to [facility].              Follow-ups after your visit       "  Additional Services     OPHTHALMOLOGY ADULT REFERRAL       Patient prefers to be called    Diagnosis/Reason for Referral: Diabetic eye exam     needed: No  Language: English    May leave message on voicemail: Yes    Referral should be tracked? No                  Who to contact     Please call your clinic at [phone] to:    Ask questions about your health    Make or cancel appointments    Discuss your medicines    Learn about your test results    Speak to your doctor   If you have compliments or concerns about an experience at your clinic, or if you wish to file a complaint, please contact [facility] Patient Relations at [phone] or email us at [email or web address]         Additional Information About Your Visit        [employer] Information     [employer] gives you secure access to your electronic health record. If you see a primary care provider, you can also send messages to your care team and make appointments. If you have questions, please call your primary care clinic.  If you do not have a primary care provider, please call [phone] and they will assist you.      [employer] is an electronic gateway that provides easy, online access to your medical records. With [employer], you can request a clinic appointment, read your test results, renew a prescription or communicate with your care team.     To access your existing account, please contact your [facility] Clinic or call [phone] for assistance.        Care EveryWhere ID     This is your Care EveryWhere ID. This could be used by other organizations to access your [facility] medical records  [id number]        Your Vitals Were     Pulse Temperature Height Pulse Oximetry BMI (Body Mass Index)       88 98.3  F (36.8  C) (Oral) 5' 3.25\" (160.7 cm) 99% 33.99 kg/m2        Blood Pressure from Last 3 Encounters:   11/08/17 128/88   10/18/17 126/84   06/05/17 108/73    Weight from Last 3 " Encounters:   11/08/17 193 lb 6.4 oz (87.7 kg)   10/18/17 196 lb (88.9 kg)   06/05/17 201 lb 3.2 oz (91.3 kg)              We Performed the Following     OPHTHALMOLOGY ADULT REFERRAL          Today's Medication Changes          These changes are accurate as of: 11/8/17  3:33 PM.  If you have any questions, ask your nurse or doctor.               Start taking these medicines.        Dose/Directions    metFORMIN 500 MG 24 hr tablet   Commonly known as:  GLUCOPHAGE-XR   Used for:  Type 2 diabetes mellitus without complication, without long-term current use of insulin (H)   Replaces:  metFORMIN 500 MG tablet   Started by:  Noemi Coughlin MD        Dose:  500 mg   Take 1 tablet (500 mg) by mouth 2 times daily (with meals)   Quantity:  60 tablet   Refills:  1         These medicines have changed or have updated prescriptions.        Dose/Directions    gabapentin 100 MG capsule   Commonly known as:  NEURONTIN   This may have changed:    - how much to take  - when to take this   Used for:  Sciatic nerve pain, left   Changed by:  Noemi Coughlin MD        Dose:  300 mg   Take 3 capsules (300 mg) by mouth 3 times daily   Quantity:  90 capsule   Refills:  0         Stop taking these medicines if you haven't already. Please contact your care team if you have questions.     metFORMIN 500 MG tablet   Commonly known as:  GLUCOPHAGE   Replaced by:  metFORMIN 500 MG 24 hr tablet   Stopped by:  Noemi Coughlin MD           ranitidine 150 MG tablet   Commonly known as:  ZANTAC   Stopped by:  Noemi Coughlin MD                Where to get your medicines      These medications were sent to [id number] - [place], [place] - [address], [place] [address]     Phone:  [phone]     gabapentin 100 MG capsule    metFORMIN 500 MG 24 hr tablet                Primary Care Provider Office Phone # Fax #    Noemi Coughlin [phone]       [facility] [address] [place]  [address]        Equal Access to Services     [facility] : Hadii eugenia garcia elvisbalaji Davisali, wapricillada luqarcadioha, qaaline rebeccaelizabethjed miguel, rosalia daniels. So [facility] [phone].    ATENCIÓN: Si habla español, tiene a harris disposición servicios gratuitos de asistencia lingüística. Destini al [phone].    We comply with applicable federal civil rights laws and [place] laws. We do not discriminate on the basis of race, color, national origin, age, disability, sex, sexual orientation, or gender identity.            Thank you!     Thank you for choosing [facility]  for your care. Our goal is always to provide you with excellent care. Hearing back from our patients is one way we can continue to improve our services. Please take a few minutes to complete the written survey that you may receive in the mail after your visit with us. Thank you!             Your Updated Medication List - Protect others around you: Learn how to safely use, store and throw away your medicines at www.disposemymeds.org.          This list is accurate as of: 11/8/17  3:33 PM.  Always use your most recent med list.                   Brand Name Dispense Instructions for use Diagnosis    acetaminophen 500 MG tablet    TYLENOL    180 tablet    Take 2 tablets (1,000 mg) by mouth 2 times daily    Sciatic nerve pain, left       alum & mag hydroxide-simethicone 200-200-20 MG/5ML Susp suspension    MYLANTA/MAALOX    148 mL    Take 30 mLs by mouth every 4 hours as needed for indigestion    Gastroesophageal reflux disease, esophagitis presence not specified       atorvastatin 80 MG tablet    LIPITOR    90 tablet    Take 1 tablet (80 mg) by mouth daily    Type 2 diabetes mellitus without complication, without long-term current use of insulin (H), Candidate for statin therapy due to risk of future cardiovascular event       BENEFIBER Powd     500 g    Take one capful in anything you like to drink once a day    Constipation,  unspecified constipation type       BLOOD GLUCOSE METER DISPOSABLE Joyce     1 each    Dispense per preferred. Test sugars as needed up to 1 per day. Dx250.0 Strips and lancets #90    Type 2 diabetes mellitus without complication, without long-term current use of insulin (H)       blood glucose monitoring test strip    no brand specified    100 strip    Use to test blood sugars 1-2 times daily or as directed    Type 2 diabetes mellitus without complication, without long-term current use of insulin (H)       cyclobenzaprine 10 MG tablet    FLEXERIL    90 tablet    Take 1 tablet (10 mg) by mouth nightly as needed for muscle spasms    Sciatic nerve pain, left       gabapentin 100 MG capsule    NEURONTIN    90 capsule    Take 3 capsules (300 mg) by mouth 3 times daily    Sciatic nerve pain, left       metFORMIN 500 MG 24 hr tablet    GLUCOPHAGE-XR    60 tablet    Take 1 tablet (500 mg) by mouth 2 times daily (with meals)    Type 2 diabetes mellitus without complication, without long-term current use of insulin (H)       omeprazole 20 MG CR capsule    priLOSEC    30 capsule    Take 1 capsule (20 mg) by mouth daily    Gastroesophageal reflux disease, esophagitis presence not specified       order for DME     1 Device    Electronic blood pressure cuff - check blood pressure 2-3 times weekly    Type 2 diabetes mellitus without complication, without long-term current use of insulin (H)       polyethylene glycol powder    MIRALAX    510 g    Take 17 g (1 capful) by mouth daily    Constipation, unspecified constipation type       senna-docusate 8.6-50 MG per tablet    SENOKOT-S;PERICOLACE    180 tablet    Take 1 tablet by mouth 2 times daily    Constipation, unspecified constipation type

## 2017-11-08 NOTE — PROGRESS NOTES
Preceptor Attestation:  Patient's case reviewed and discussed with Noemi Coughlin MD Patient seen and discussed with the resident.. I agree with assessment and plan of care.  Supervising Physician:  Herson Akins MD  [facility]

## 2017-11-08 NOTE — PATIENT INSTRUCTIONS
Thanks for coming in today Sushila Flores!    Your diabetes is well controlled.   -We changed to a new type of metformin, start at 500mg once daily.   -Stop taking zantac and just use prilosec for GERD.   -Start up your PT exercises for your back pain  -After tolerating the new metformin, go ahead and increase your gabapentin to 300mg three times per day. This can make you sleepy so be careful the first time you take it.     Follow up in 2-4 weeks, at your convenience for back pain.    Your medication list is printed, please keep this with you, it is helpful to bring this current list to any other medical appointments, the emergency room or hospital.    If you had lab testing today and your results are reassuring or normal they will be be mailed to you within 7 days.     If the lab tests need quick action we will call you with the results.   The phone number we will call with results is # [phone] (home) . If this is not the best number please call our clinic and change the number.    If you need any refills please call your pharmacy and they will contact us.    If you have any further concerns or wish to schedule another appointment you must call our office during normal business hours  [phone] (8-5:00 M-F)  If you have urgent medical questions that cannot wait  you may also call [phone] at any time of day.  If you have a medical emergency please call [phone].    Thank you for coming to [facility].    Referral for (Test):OPHTHALMOLOGY   Location/Place/Provider: [facility], [address] [place] [address] [address], [place] [address]    Date/Time: 11/20/17 at 11:00am    Phone: [phone]  Fax:   Additional information/prep.:   Scheduled by: SANJEEV Palma

## 2017-11-08 NOTE — PROGRESS NOTES
SUBJECTIVE:  Sushila Flores is a 56 year old who presents today for follow up of DIABETES MELLITUS and stomach pain.         1.  Diabetes:  Patient glucose self monitoring: two times daily, once daily.   Blood glucose averages: 100-115  Symptoms of low blood sugar (hypoglycemia:sweating, shaky, weak, dizzy, blurred vision, confusion)? no  Problems taking medications regularly? Yes- has been off metformin for the past week  Side effects? GI side effects from metformin  What are you doing for exercise outside of work or your daily activities? Walking daily, walking to the bus stop    Metformin  Atorvastatin    2. Stomach pain/ nausea/vomiting- follow up  Patient concerns: much improved on omeprazole and pepcid  No more nausea/vomiting      Health maintenance reviewed and appropriate orders placed?  Yes      BP Readings from Last 3 Encounters:   11/08/17 128/88   10/18/17 126/84   06/05/17 108/73       Lab Results   Component Value Date    A1C 6.1 10/18/2017    A1C 6.7 03/17/2017    A1C 6.0 02/14/2014       Recent Labs   Lab Test  03/17/17   1420  02/14/14   1424   CHOL  193.0  206.0*   HDL  65.0  68.0   LDL  112.0*  117.0*   TRIG  82.0  100.0   CHOLHDLRATIO  3.0  3.0       Wt Readings from Last 3 Encounters:   11/08/17 193 lb 6.4 oz (87.7 kg)   10/18/17 196 lb (88.9 kg)   06/05/17 201 lb 3.2 oz (91.3 kg)       Current Outpatient Prescriptions   Medication Sig Dispense Refill     omeprazole (PRILOSEC) 20 MG CR capsule Take 1 capsule (20 mg) by mouth daily 30 capsule 1     ranitidine (ZANTAC) 150 MG tablet Take 1 tablet (150 mg) by mouth 2 times daily 60 tablet 1     order for DME Electronic blood pressure cuff - check blood pressure 2-3 times weekly 1 Device 0     blood glucose monitoring (NO BRAND SPECIFIED) test strip Use to test blood sugars 1-2 times daily or as directed 100 strip 3     Blood Gluc Meter Disp-Strips (BLOOD GLUCOSE METER DISPOSABLE) MARCIANO Dispense per preferred. Test sugars as needed up to 1 per day.  "Dx250.0 Strips and lancets #90 1 each 0     gabapentin (NEURONTIN) 100 MG capsule Take 1 capsule (100 mg) by mouth At Bedtime 90 capsule 0     senna-docusate (SENOKOT-S;PERICOLACE) 8.6-50 MG per tablet Take 1 tablet by mouth 2 times daily 180 tablet 1     polyethylene glycol (MIRALAX) powder Take 17 g (1 capful) by mouth daily 510 g 1     acetaminophen (TYLENOL) 500 MG tablet Take 2 tablets (1,000 mg) by mouth 2 times daily 180 tablet 0     alum & mag hydroxide-simethicone (MYLANTA/MAALOX) 200-200-20 MG/5ML SUSP suspension Take 30 mLs by mouth every 4 hours as needed for indigestion (Patient not taking: Reported on 11/8/2017) 148 mL 0     Wheat Dextrin (BENEFIBER) POWD Take one capful in anything you like to drink once a day (Patient not taking: Reported on 11/8/2017) 500 g 3     atorvastatin (LIPITOR) 80 MG tablet Take 1 tablet (80 mg) by mouth daily (Patient not taking: Reported on 11/8/2017) 90 tablet 3     metFORMIN (GLUCOPHAGE) 500 MG tablet Take 2 tablets (1,000 mg) by mouth 2 times daily (with meals) (Patient not taking: Reported on 11/8/2017) 360 tablet 3     cyclobenzaprine (FLEXERIL) 10 MG tablet Take 1 tablet (10 mg) by mouth nightly as needed for muscle spasms (Patient not taking: Reported on 11/8/2017) 90 tablet 0       Histories reviewed and updated in Epic.      ROS:  C: NEGATIVE for fatigue, unexpected change in weight  E: NEGATIVE for acute vision problems or changes  R: NEGATIVE for significant cough or shortness of breath  CV: NEGATIVE for chest pain, palpitations or new or worsening peripheral edema  P: NEGATIVE for changes in mood or affect    EXAM:  Vitals: /88 (BP Location: Right arm, Patient Position: Chair, Cuff Size: Adult Regular)  Pulse 88  Temp 98.3  F (36.8  C) (Oral)  Ht 5' 3.25\" (160.7 cm)  Wt 193 lb 6.4 oz (87.7 kg)  SpO2 99%  BMI 33.99 kg/m2  BMIE= Body mass index is 33.99 kg/(m^2).  GENERAL APPEARANCE: alert and no acute distress  PSYCH: mentation appears normal and " affect normal/bright  RESP: lungs clear to auscultation - no rales, rhonchi or wheezes  CV: regular rate and rhythm, normal S1 S2, no S3 or S4 and no murmur, click or rub -  EXT: no cyanosis or edema in lower extremities  SKIN: no venous stasis changes  Foot Exam: normal DP and PT pulses, no trophic changes or ulcerative lesions, normal sensory exam and normal monofilament exam    ASSESSMENT AND PLAN:  1. Sciatic nerve pain, left  -restart PT exercises and follow up in 2-4 weeks for back pain visit  - gabapentin (NEURONTIN) 100 MG capsule; Take 3 capsules (300 mg) by mouth 3 times daily  Dispense: 90 capsule; Refill: 0    2. Type 2 diabetes mellitus without complication, without long-term current use of insulin (H) Well controlled. Continue metformin. Continue statin. Not on ASA due to GERD.  - metFORMIN (GLUCOPHAGE-XR) 500 MG 24 hr tablet; Take 1 tablet (500 mg) by mouth 2 times daily (with meals)  Dispense: 60 tablet; Refill: 1  - OPHTHALMOLOGY ADULT REFERRAL    Noemi Coughlin    Precepted with: Herson Akins MD

## 2017-12-08 ENCOUNTER — OFFICE VISIT (OUTPATIENT)
Dept: FAMILY MEDICINE | Facility: CLINIC | Age: 57
End: 2017-12-08

## 2017-12-08 VITALS
OXYGEN SATURATION: 100 % | SYSTOLIC BLOOD PRESSURE: 125 MMHG | TEMPERATURE: 98.4 F | HEART RATE: 88 BPM | RESPIRATION RATE: 16 BRPM | DIASTOLIC BLOOD PRESSURE: 75 MMHG | WEIGHT: 199 LBS | BODY MASS INDEX: 33.97 KG/M2 | HEIGHT: 64 IN

## 2017-12-08 DIAGNOSIS — M54.32 SCIATIC NERVE PAIN, LEFT: ICD-10-CM

## 2017-12-08 NOTE — PATIENT INSTRUCTIONS
-Try 200mg (2 pills) Gabapentin before bed and 100mg (1 pill) during the day, or just 200mg at night.  -Follow up in 1-2 weeks if sleepiness does not improve        Your medication list is printed, please keep this with you, it is helpful to bring this current list to any other medical appointments, the emergency room or hospital.    If you had lab testing today and your results are reassuring or normal they will be be mailed to you within 7 days.     If the lab tests need quick action we will call you with the results.   The phone number we will call with results is # [phone] (home) . If this is not the best number please call our clinic and change the number.    If you need any refills please call your pharmacy and they will contact us.    If you have any further concerns or wish to schedule another appointment you must call our office during normal business hours  [phone] (8-5:00 M-F)  If you have urgent medical questions that cannot wait  you may also call [phone] at any time of day.  If you have a medical emergency please call [phone].    Thank you for coming to [facility].

## 2017-12-08 NOTE — MR AVS SNAPSHOT
After Visit Summary   12/8/2017    Sushila Flores    MRN: [id number]           Patient Information     Date Of Birth          1960        Visit Information        Provider Department      12/8/2017 3:00 PM Noemi Coughlin MD [facility]        Today's Diagnoses     Sciatic nerve pain, left          Care Instructions    -Try 200mg (2 pills) Gabapentin before bed and 100mg (1 pill) during the day, or just 200mg at night.  -Follow up in 1-2 weeks if sleepiness does not improve        Your medication list is printed, please keep this with you, it is helpful to bring this current list to any other medical appointments, the emergency room or hospital.    If you had lab testing today and your results are reassuring or normal they will be be mailed to you within 7 days.     If the lab tests need quick action we will call you with the results.   The phone number we will call with results is # [phone] (home) . If this is not the best number please call our clinic and change the number.    If you need any refills please call your pharmacy and they will contact us.    If you have any further concerns or wish to schedule another appointment you must call our office during normal business hours  [phone] (8-5:00 M-F)  If you have urgent medical questions that cannot wait  you may also call [phone] at any time of day.  If you have a medical emergency please call [phone].    Thank you for coming to [facility].            Follow-ups after your visit        Who to contact     Please call your clinic at [phone] to:    Ask questions about your health    Make or cancel appointments    Discuss your medicines    Learn about your test results    Speak to your doctor   If you have compliments or concerns about an experience at your clinic, or if you wish to file a complaint, please contact [facility] Patient Relations at [phone] or email us at  "[email or web address]         Additional Information About Your Visit        myWebRoomhart Information     [employer] gives you secure access to your electronic health record. If you see a primary care provider, you can also send messages to your care team and make appointments. If you have questions, please call your primary care clinic.  If you do not have a primary care provider, please call [phone] and they will assist you.      [employer] is an electronic gateway that provides easy, online access to your medical records. With [employer], you can request a clinic appointment, read your test results, renew a prescription or communicate with your care team.     To access your existing account, please contact your [facility] or call [phone] for assistance.        Care EveryWhere ID     This is your Care EveryWhere ID. This could be used by other organizations to access your [place] medical records  [id number]        Your Vitals Were     Pulse Temperature Respirations Height Pulse Oximetry BMI (Body Mass Index)    88 98.4  F (36.9  C) (Oral) 16 5' 3.6\" (161.5 cm) 100% 34.59 kg/m2       Blood Pressure from Last 3 Encounters:   12/08/17 125/75   11/08/17 128/88   10/18/17 126/84    Weight from Last 3 Encounters:   12/08/17 199 lb (90.3 kg)   11/08/17 193 lb 6.4 oz (87.7 kg)   10/18/17 196 lb (88.9 kg)              Today, you had the following     No orders found for display       Primary Care Provider Office Phone # Fax #    Noemi Coughlin [phone]       [facility] [address] [place] [address]        Equal Access to Services     SAÚL DOVER AH: Hadii eugenia henderson Sobrittaney, waaxda luqadaha, qaybta kaalmada myriam, rosalia byrne . So [facility] [phone].    ATENCIÓN: Si habla español, tiene a harris disposición servicios gratuitos de asistencia lingüística. Llame al [phone].    We comply with applicable federal " civil rights laws and [place] laws. We do not discriminate on the basis of race, color, national origin, age, disability, sex, sexual orientation, or gender identity.            Thank you!     Thank you for choosing [facility]  for your care. Our goal is always to provide you with excellent care. Hearing back from our patients is one way we can continue to improve our services. Please take a few minutes to complete the written survey that you may receive in the mail after your visit with us. Thank you!             Your Updated Medication List - Protect others around you: Learn how to safely use, store and throw away your medicines at www.disposemymeds.org.          This list is accurate as of: 12/8/17  3:35 PM.  Always use your most recent med list.                   Brand Name Dispense Instructions for use Diagnosis    acetaminophen 500 MG tablet    TYLENOL    180 tablet    Take 2 tablets (1,000 mg) by mouth 2 times daily    Sciatic nerve pain, left       alum & mag hydroxide-simethicone 200-200-20 MG/5ML Susp suspension    MYLANTA/MAALOX    148 mL    Take 30 mLs by mouth every 4 hours as needed for indigestion    Gastroesophageal reflux disease, esophagitis presence not specified       atorvastatin 80 MG tablet    LIPITOR    90 tablet    Take 1 tablet (80 mg) by mouth daily    Type 2 diabetes mellitus without complication, without long-term current use of insulin (H), Candidate for statin therapy due to risk of future cardiovascular event       BENEFIBER Powd     500 g    Take one capful in anything you like to drink once a day    Constipation, unspecified constipation type       BLOOD GLUCOSE METER DISPOSABLE Joyce     1 each    Dispense per preferred. Test sugars as needed up to 1 per day. Dx250.0 Strips and lancets #90    Type 2 diabetes mellitus without complication, without long-term current use of insulin (H)       blood glucose monitoring test strip    no brand specified    100 strip    Use to  test blood sugars 1-2 times daily or as directed    Type 2 diabetes mellitus without complication, without long-term current use of insulin (H)       cyclobenzaprine 10 MG tablet    FLEXERIL    90 tablet    Take 1 tablet (10 mg) by mouth nightly as needed for muscle spasms    Sciatic nerve pain, left       gabapentin 100 MG capsule    NEURONTIN    90 capsule    Take 3 capsules (300 mg) by mouth 3 times daily    Sciatic nerve pain, left       metFORMIN 500 MG 24 hr tablet    GLUCOPHAGE-XR    60 tablet    Take 1 tablet (500 mg) by mouth 2 times daily (with meals)    Type 2 diabetes mellitus without complication, without long-term current use of insulin (H)       omeprazole 20 MG CR capsule    priLOSEC    30 capsule    Take 1 capsule (20 mg) by mouth daily    Gastroesophageal reflux disease, esophagitis presence not specified       order for DME     1 Device    Electronic blood pressure cuff - check blood pressure 2-3 times weekly    Type 2 diabetes mellitus without complication, without long-term current use of insulin (H)       polyethylene glycol powder    MIRALAX    510 g    Take 17 g (1 capful) by mouth daily    Constipation, unspecified constipation type       senna-docusate 8.6-50 MG per tablet    SENOKOT-S;PERICOLACE    180 tablet    Take 1 tablet by mouth 2 times daily    Constipation, unspecified constipation type

## 2017-12-08 NOTE — PROGRESS NOTES
"S:  Sushila Flores is a 56 year old year old female who  has a past medical history of Bilateral carpal tunnel syndrome (12/24/2015); Chronic back pain (8/20/2014); and Osteoarthritis (12/24/2015). who presents to discuss:    Low back pain  She was evaluated last month on 11/8/17 and prescribed Gabapentin. She reports improvement in her symptoms but she is very sleepy. She rates the pain at 2-3/10 today and states this has been her new baseline. She has struggled with constipation in the past but is managing this well now. She denies any nausea, vomiting, abdominal pain, or change in bowel or bladder habits.     Diabetes  She reports her diabetes is in well control and she recently had her yearly eye exam with no issues.    Complete ROS otherwise negative.     PMH: sciatic nerve pain, DM2, osteoarthritis, CKD stage 2    O:  Vitals: /75  Pulse 88  Temp 98.4  F (36.9  C) (Oral)  Resp 16  Ht 5' 3.6\" (161.5 cm)  Wt 199 lb (90.3 kg)  SpO2 100%  BMI 34.59 kg/m2    General: Alert, well-appearing, no acute distress  HEENT: PERRL, moist oral mucus membranes  Pulm: clear to auscultation bilaterally, no tachypnea  CV: RRR, no murmur  Ext: Warm, well perfused, no LE edema  Skin: No rash on limited skin exam  Psych: Mood appropriate to visit content, full affect, rational thought content and process      A/P:  Left Sciatic nerve pain  -Continue with PT exercises at home  -Try gabapentin 200mg qhs and 100mg in the morning, or just 200mg qhs.  -Follow up in 1 weeks if sleepiness persists    Jodran HOUSTON MS3 am acting as scribe for Dr. Noemi Coughlin MD.    In supervising the medical student, I saw and evaluated the patient with the student and personally performed all aspects of the history and physical.  I have reviewed and verified that the documentation is correct and complete.    Noemi Coughlin MD  [facility] Resident  Pager     Precepted with: Isabel Kendrick MD      "

## 2018-03-01 DIAGNOSIS — E11.9 TYPE 2 DIABETES MELLITUS WITHOUT COMPLICATION, WITHOUT LONG-TERM CURRENT USE OF INSULIN (H): ICD-10-CM

## 2018-03-02 RX ORDER — METFORMIN HCL 500 MG
500 TABLET, EXTENDED RELEASE 24 HR ORAL 2 TIMES DAILY WITH MEALS
Qty: 60 TABLET | Refills: 11 | Status: SHIPPED | OUTPATIENT
Start: 2018-03-02 | End: 2019-04-05

## 2018-05-30 ENCOUNTER — OFFICE VISIT (OUTPATIENT)
Dept: FAMILY MEDICINE | Facility: CLINIC | Age: 58
End: 2018-05-30
Payer: COMMERCIAL

## 2018-05-30 VITALS
OXYGEN SATURATION: 100 % | HEIGHT: 63 IN | HEART RATE: 69 BPM | TEMPERATURE: 98 F | SYSTOLIC BLOOD PRESSURE: 113 MMHG | WEIGHT: 196 LBS | BODY MASS INDEX: 34.73 KG/M2 | DIASTOLIC BLOOD PRESSURE: 78 MMHG

## 2018-05-30 DIAGNOSIS — Z13.9 SCREENING FOR CONDITION: ICD-10-CM

## 2018-05-30 DIAGNOSIS — E11.9 TYPE 2 DIABETES MELLITUS WITHOUT COMPLICATION, WITHOUT LONG-TERM CURRENT USE OF INSULIN (H): ICD-10-CM

## 2018-05-30 DIAGNOSIS — K21.9 GASTROESOPHAGEAL REFLUX DISEASE, ESOPHAGITIS PRESENCE NOT SPECIFIED: Primary | ICD-10-CM

## 2018-05-30 PROBLEM — N18.2 CKD (CHRONIC KIDNEY DISEASE) STAGE 2, GFR 60-89 ML/MIN: Status: RESOLVED | Noted: 2017-03-21 | Resolved: 2018-05-30

## 2018-05-30 LAB
CHOLEST SERPL-MCNC: 180 MG/DL
CHOLEST/HDLC SERPL: 3.1 RATIO
HBA1C MFR BLD: 6 % (ref 4.1–5.7)
HDLC SERPL-MCNC: 58 MG/DL
HIV 1+2 AB+HIV1 P24 AG SERPL QL IA: NEGATIVE
LDLC SERPL CALC-MCNC: 105 MG/DL (ref 0–99)
TRIGL SERPL-MCNC: 79 MG/DL
VLDL-CHOLESTEROL: 16 MG/DL (ref 7–32)

## 2018-05-30 NOTE — PROGRESS NOTES
HPI:       Sushila Flores is a 57 year old  female with a significant past medical history of chronic pain, type II diabetes, osteoarthritis and obesity who presents for the new concern(s) of    Med Concerns  Was most recently on omeprazole for GERD but previously was on Ranitidine. Stated that she recently switched and since then, has been experiencing worsening abdominal burning pain associated with food. Did report associated nausea but no vomiting. Currently in no pain at this time and no exertional component. Would like to restart her Ranitidine. No associated shortness of breath, lightheadedness, dizziness, worsening constipation, diarrhea or numbness.     Diabetes  Feels like her diabetes has been going well.  States that her sugars in the morning has been between 80 and 120 with the highest reading of 126 after undergoing colonoscopy prep.  Denies any episodes of hypoglycemia, tremors, lightheadedness, dizziness, presyncope.  Report polyuria with urinating up to 20 times per days secondary to urinary dysfunction from pregnancy but denies any recent changes.  Denies any polydipsia.  Normally eats a good diet but more recently has been eating more sugary sweets.  Has not been active due to the weather but is planning on being more active as the weather warms up. No other concerns.           PMHX:     Patient Active Problem List   Diagnosis     Sciatic nerve pain     Vitamin D deficiency     Chronic back pain     Bilateral carpal tunnel syndrome     Osteoarthritis of multiple joints     Pap smear for cervical cancer screening     Spondylosis of cervical region without myelopathy or radiculopathy     BMI 36.0-36.9,adult     Candidate for statin therapy due to risk of future cardiovascular event     Type 2 diabetes mellitus without complication (H)     CKD (chronic kidney disease) stage 2, GFR 60-89 ml/min     Positive FIT (fecal immunochemical test)     Special screening for malignant neoplasms, colon        Current Outpatient Prescriptions   Medication Sig Dispense Refill     acetaminophen (TYLENOL) 500 MG tablet Take 2 tablets (1,000 mg) by mouth 2 times daily 180 tablet 0     alum & mag hydroxide-simethicone (MYLANTA/MAALOX) 200-200-20 MG/5ML SUSP suspension Take 30 mLs by mouth every 4 hours as needed for indigestion 148 mL 0     atorvastatin (LIPITOR) 80 MG tablet Take 1 tablet (80 mg) by mouth daily 90 tablet 3     Blood Gluc Meter Disp-Strips (BLOOD GLUCOSE METER DISPOSABLE) MARCIANO Dispense per preferred. Test sugars as needed up to 1 per day. Dx250.0 Strips and lancets #90 1 each 0     blood glucose monitoring (NO BRAND SPECIFIED) test strip Use to test blood sugars 1-2 times daily or as directed 100 strip 3     metFORMIN (GLUCOPHAGE-XR) 500 MG 24 hr tablet Take 1 tablet (500 mg) by mouth 2 times daily (with meals) 60 tablet 11     order for DME Electronic blood pressure cuff - check blood pressure 2-3 times weekly 1 Device 0     polyethylene glycol (MIRALAX) powder Take 17 g (1 capful) by mouth daily 510 g 1     ranitidine (ZANTAC) 150 MG tablet Take 1 tablet (150 mg) by mouth 2 times daily 60 tablet 1     senna-docusate (SENOKOT-S;PERICOLACE) 8.6-50 MG per tablet Take 1 tablet by mouth 2 times daily 180 tablet 1     Social History     Social History     Marital status: Single     Spouse name: N/A     Number of children: N/A     Years of education: N/A     Occupational History     Not on file.     Social History Main Topics     Smoking status: Former Smoker     Types: Cigarettes     Quit date: 8/1/2013     Smokeless tobacco: Never Used     Alcohol use 0.0 oz/week     0 Standard drinks or equivalent per week      Comment: ocassionally     Drug use: No     Sexual activity: Not on file     Other Topics Concern     Not on file     Social History Narrative    Works at [employer]. 4 children, one set of twins. 3 grandkids.      Allergies   Allergen Reactions     Amoxicillin Swelling      "Penicillin G Nausea and Vomiting     Percocet [Oxycodone-Acetaminophen]      Vicodin [Hydrocodone-Acetaminophen]      No results found for this or any previous visit (from the past 24 hour(s)).         Review of Systems:   C: NEGATIVE for fatigue, unexpected change in weight  E: NEGATIVE for acute vision problems or changes  E/M: NEGATIVE for ear, mouth and throat problems  R: NEGATIVE for significant cough or shortness of breath  CV: midline burning pain: see HPI  GI: Nausea  : NEGATIVE for frequency, dysuria, or hematuria  MUSCULOSKELETAL: chronic back pain  N: NEGATIVE for weakness, dizziness, syncope, headaches  P: NEGATIVE for changes in mood or affect          Physical Exam:     Vitals:    05/30/18 1400   BP: 113/78   Pulse: 69   Temp: 98  F (36.7  C)   TempSrc: Oral   SpO2: 100%   Weight: 196 lb (88.9 kg)   Height: 5' 3.39\" (161 cm)     Body mass index is 34.3 kg/(m^2).    GENERAL APPEARANCE: healthy, alert and no distress,  EYES: Eyes grossly normal to inspection,  PERRL  RESP: lungs clear to auscultation - no rales, rhonchi or wheezes  CV: regular rate and rhythm,  and no murmur, click,  rub or gallop  ABDOMEN: soft, nontender, without hepatosplenomegaly or masses  MS: extremities normal- no gross deformities noted  PSYCH: mood and affect normal/bright    Assessment and Plan     1. Gastroesophageal reflux disease, esophagitis presence not specified  Did well on H2-blocker and not as well with PPI. Will transition per patient request.  - ranitidine (ZANTAC) 150 MG tablet; Take 1 tablet (150 mg) by mouth 2 times daily  Dispense: 60 tablet; Refill: 1    2. Type 2 diabetes mellitus without complication, without long-term current use of insulin (H) vs Prediabetes  Has been on Metformin but most recently intolerance of new medication (likely due to binding component given tolerance to original before refills) and short acting formulation with GI disturbances. Discuss importance of sugar control and good dietary " nutrition.  - Continue with good diet  - Increase activity as tolerated (walk more)  - Discuss Metformin with pharmacy and try to get medication from previous  if possible  - Microalbuminuria pending     - Hemoglobin A1c ([facility])    3. Screening for condition  - HIV Ag/Ab Screen [place] ([facility])  - Lipid Panel ([facility])    Options for treatment and follow-up care were reviewed with the patient and/or guardian. Sushila Flores and/or guardian engaged in the decision making process and verbalized understanding of the options discussed and agreed with the final plan.    Darius Santo MD  [facility]  Pager: [phone]    Precepted today with: Dr Antony Mares

## 2018-05-30 NOTE — LETTER
June 1, 2018      Sushila Flores  GENERAL DELIVERY [place] [address]        Dear Sushila,    Thank you for visiting our clinic on May 30, 2018.     The result of your recent labwork (HIV, Lipid Panel and Hemoglobin A1c) has returned.     Your HIV screening was negative. Indicating that you do not have HIV.     Your Hemoglobin A1c was 6.0 which is where it normally runs. Hemoglobin A1c is a marker for how well your blood sugar level have been going over the previous 3 months. Continue with your current medication and we will check this again in 6 months.     Your Lipid Panel was unremarkable. Slightly elevated LD but better then it was about 1 year ago. Continue with good healthy diet and activity. We will plan to recheck this in 1 year.     Please see below for your test results.    Resulted Orders   Hemoglobin A1c ([facility])   Result Value Ref Range    Hemoglobin A1C 6.0 (H) 4.1 - 5.7 %   [facility] [place] [facility]   Result Value Ref Range    HIV Antigen/Antibody Negative Negative    Narrative    Test performed by:  [facility] [address] [place] [address]  Method is Abbott HIV Ag/Ab for the detection of HIV p24 antigen, HIV-1   antibodies and HIV-2 antibodies.   Lipid Panel ([facility])   Result Value Ref Range    Cholesterol 180.0 <200.0 mg/dL    Triglycerides 79.0 <150.0 mg/dL    HDL Cholesterol 58.0 >50.0 mg/dL      Comment:      If diabetic or CVD then reference range <100    VLDL-Cholesterol 16.0 7.0 - 32.0 mg/dL    LDL Cholesterol Direct 105.0 (H) 0.0 - 99.0 mg/dL    Cholesterol/HDL Ratio 3.1 <5.0 RATIO       If you have any questions, please call the clinic to make an appointment.    Sincerely,    Darius Santo MD

## 2018-05-30 NOTE — MR AVS SNAPSHOT
After Visit Summary   5/30/2018    Sushila Flores    MRN: [id number]           Patient Information     Date Of Birth          1960        Visit Information        Provider Department      5/30/2018 2:20 PM Darius Santo MD [facility]        Today's Diagnoses     Gastroesophageal reflux disease, esophagitis presence not specified    -  1    Type 2 diabetes mellitus without complication, without long-term current use of insulin (H)        Screening for condition          Care Instructions    Ask the pharmacist at [facility] what  was used for your previous Metformin refill and if it is possible to request this for future refills.     - Continue with your metformin after checking with pharmacy  - Continue your other medication  - Start taking Ranitidine for your acid reflux, stop omeprazole  - Follow up in 1 month for physical with Dr. Coughlin          Follow-ups after your visit        Follow-up notes from your care team     Return in about 1 month (around 6/30/2018).      Who to contact     Please call your clinic at [phone] to:    Ask questions about your health    Make or cancel appointments    Discuss your medicines    Learn about your test results    Speak to your doctor            Additional Information About Your Visit        AudiotoniqharXenapto Information     [employer]ishing [employer] gives you secure access to your electronic health record. If you see a primary care provider, you can also send messages to your care team and make appointments. If you have questions, please call your primary care clinic.  If you do not have a primary care provider, please call [phone] and they will assist you.      [employer] is an electronic gateway that provides easy, online access to your medical records. With [employer], you can request a clinic appointment, read your test results, renew a prescription or communicate with your care team.     To access your existing account, please contact your [facility]  [facility] [facility] or call [phone] for assistance.        Care EveryWhere ID     This is your Care EveryWhere ID. This could be used by other organizations to access your [facility] medical records  [id number]        Your Vitals Were     Pulse Temperature Height Pulse Oximetry BMI (Body Mass Index)       69 98  F (36.7  C) (Oral) 5' 3.39\" (161 cm) 100% 34.3 kg/m2        Blood Pressure from Last 3 Encounters:   05/30/18 113/78   12/08/17 125/75   11/08/17 128/88    Weight from Last 3 Encounters:   05/30/18 196 lb (88.9 kg)   12/08/17 199 lb (90.3 kg)   11/08/17 193 lb 6.4 oz (87.7 kg)              We Performed the Following     Hemoglobin A1c ([facility])     HIV Ag/Ab Screen [place] ([facility])     Lipid Panel ([facility])     Microalbumin Random Ur ([facility])          Today's Medication Changes          These changes are accurate as of 5/30/18  2:38 PM.  If you have any questions, ask your nurse or doctor.               Start taking these medicines.        Dose/Directions    ranitidine 150 MG tablet   Commonly known as:  ZANTAC   Used for:  Gastroesophageal reflux disease, esophagitis presence not specified   Started by:  Darius Santo MD        Dose:  150 mg   Take 1 tablet (150 mg) by mouth 2 times daily   Quantity:  60 tablet   Refills:  1         Stop taking these medicines if you haven't already. Please contact your care team if you have questions.     omeprazole 20 MG CR capsule   Commonly known as:  priLOSEC   Stopped by:  Darius Santo MD                Where to get your medicines      These medications were sent to [id number] - [place], [place] - [address], [place] [address]     Phone:  [phone]     ranitidine 150 MG tablet                Primary Care Provider Office Phone # Fax #    Noemi Coughlin [phone]       [facility] [address] [place] [address]        Equal Access to Services     SAÚL DOVER AH: Hadii " eugenia Ramos, wapricillada lokeshadaha, qaybta kaalmada kelleysalena, rosalia islasdeyarina byrne frances. So [facility] [phone].    ATENCIÓN: Si habla español, tiene a harris disposición servicios gratuitos de asistencia lingüística. Destini al [phone].    We comply with applicable federal civil rights laws and [place] laws. We do not discriminate on the basis of race, color, national origin, age, disability, sex, sexual orientation, or gender identity.            Thank you!     Thank you for choosing [facility]  for your care. Our goal is always to provide you with excellent care. Hearing back from our patients is one way we can continue to improve our services. Please take a few minutes to complete the written survey that you may receive in the mail after your visit with us. Thank you!             Your Updated Medication List - Protect others around you: Learn how to safely use, store and throw away your medicines at www.disposemymeds.org.          This list is accurate as of 5/30/18  2:38 PM.  Always use your most recent med list.                   Brand Name Dispense Instructions for use Diagnosis    acetaminophen 500 MG tablet    TYLENOL    180 tablet    Take 2 tablets (1,000 mg) by mouth 2 times daily    Sciatic nerve pain, left       alum & mag hydroxide-simethicone 200-200-20 MG/5ML Susp suspension    MYLANTA/MAALOX    148 mL    Take 30 mLs by mouth every 4 hours as needed for indigestion    Gastroesophageal reflux disease, esophagitis presence not specified       atorvastatin 80 MG tablet    LIPITOR    90 tablet    Take 1 tablet (80 mg) by mouth daily    Type 2 diabetes mellitus without complication, without long-term current use of insulin (H), Candidate for statin therapy due to risk of future cardiovascular event       BLOOD GLUCOSE METER DISPOSABLE Joyce     1 each    Dispense per preferred. Test sugars as needed up to 1 per day. Dx250.0 Strips and lancets #90    Type 2 diabetes mellitus  without complication, without long-term current use of insulin (H)       blood glucose monitoring test strip    no brand specified    100 strip    Use to test blood sugars 1-2 times daily or as directed    Type 2 diabetes mellitus without complication, without long-term current use of insulin (H)       metFORMIN 500 MG 24 hr tablet    GLUCOPHAGE-XR    60 tablet    Take 1 tablet (500 mg) by mouth 2 times daily (with meals)    Type 2 diabetes mellitus without complication, without long-term current use of insulin (H)       order for DME     1 Device    Electronic blood pressure cuff - check blood pressure 2-3 times weekly    Type 2 diabetes mellitus without complication, without long-term current use of insulin (H)       polyethylene glycol powder    MIRALAX    510 g    Take 17 g (1 capful) by mouth daily    Constipation, unspecified constipation type       ranitidine 150 MG tablet    ZANTAC    60 tablet    Take 1 tablet (150 mg) by mouth 2 times daily    Gastroesophageal reflux disease, esophagitis presence not specified       senna-docusate 8.6-50 MG per tablet    SENOKOT-S;PERICOLACE    180 tablet    Take 1 tablet by mouth 2 times daily    Constipation, unspecified constipation type

## 2018-05-30 NOTE — PROGRESS NOTES
Preceptor Attestation:   Patient seen, evaluated and discussed with the resident. I have verified the content of the note, which accurately reflects my assessment of the patient and the plan of care.    Supervising Physician:Antony Mares MD    [facility]

## 2018-05-31 ASSESSMENT — PATIENT HEALTH QUESTIONNAIRE - PHQ9: SUM OF ALL RESPONSES TO PHQ QUESTIONS 1-9: 11

## 2018-06-07 ENCOUNTER — HOSPITAL ENCOUNTER (OUTPATIENT)
Dept: MAMMOGRAPHY | Facility: CLINIC | Age: 58
Discharge: HOME | End: 2018-06-07

## 2018-06-07 DIAGNOSIS — Z12.31 VISIT FOR SCREENING MAMMOGRAM: ICD-10-CM

## 2018-06-25 ENCOUNTER — TELEPHONE (OUTPATIENT)
Dept: FAMILY MEDICINE | Facility: CLINIC | Age: 58
End: 2018-06-25

## 2018-06-25 NOTE — TELEPHONE ENCOUNTER
Call patient this morning regarding mammogram results as below. Patient had no other questions regarding results and received multiple letter in the mail about results.    BILATERAL FULL FIELD DIGITAL SCREENING MAMMOGRAM      Performed on: 6/7/18   Compared to: 03/20/2017 Mammo Screening Bilateral, 03/19/2015 Mammo Screening Bilateral, and 03/07/2014 Mammo Screening Bilateral  Findings: The breasts are almost entirely fatty. There is no radiographic evidence of malignancy. This study was evaluated with the assistance of Computer-Aided Detection. Continue routine screening mammogram as recommended.   ACR BI-RADS Category 1: Negative    Diabetes  Stated that she has been checking her sugars everyday and have notice that her sugars have been well control around 100s fasting despite self-discontinuing her Metformin. She recently received a refill which caused her to developed hives and has not restarted per instruction from her last visit. Has upcoming visit with Dr. Coughlin. Will readdress Metformin at that time.    PCP update via face-to-face.    Darius Santo MD  [facility]  Pager: [phone]

## 2018-06-29 DIAGNOSIS — E11.9 TYPE 2 DIABETES MELLITUS WITHOUT COMPLICATION, WITHOUT LONG-TERM CURRENT USE OF INSULIN (H): ICD-10-CM

## 2018-10-22 ENCOUNTER — TELEPHONE (OUTPATIENT)
Dept: FAMILY MEDICINE | Facility: CLINIC | Age: 58
End: 2018-10-22

## 2018-10-22 NOTE — TELEPHONE ENCOUNTER
ED follow up    Left a message on VM as it had an identifier and encouraged her to come in soon and touch base with her dr as well, and encouraged use of 24 hr line if needed as well.    Jose Metz

## 2018-10-29 ENCOUNTER — OFFICE VISIT (OUTPATIENT)
Dept: FAMILY MEDICINE | Facility: CLINIC | Age: 58
End: 2018-10-29
Payer: MEDICARE

## 2018-10-29 VITALS
DIASTOLIC BLOOD PRESSURE: 76 MMHG | SYSTOLIC BLOOD PRESSURE: 117 MMHG | HEIGHT: 63 IN | HEART RATE: 93 BPM | OXYGEN SATURATION: 100 % | BODY MASS INDEX: 34.73 KG/M2 | WEIGHT: 196 LBS | RESPIRATION RATE: 18 BRPM | TEMPERATURE: 98.3 F

## 2018-10-29 DIAGNOSIS — M54.42 CHRONIC LEFT-SIDED LOW BACK PAIN WITH LEFT-SIDED SCIATICA: Primary | ICD-10-CM

## 2018-10-29 DIAGNOSIS — G89.29 CHRONIC LEFT-SIDED LOW BACK PAIN WITH LEFT-SIDED SCIATICA: Primary | ICD-10-CM

## 2018-10-29 RX ORDER — CYCLOBENZAPRINE HCL 10 MG
10 TABLET ORAL 3 TIMES DAILY PRN
Qty: 42 TABLET | Refills: 0 | Status: SHIPPED | OUTPATIENT
Start: 2018-10-29 | End: 2018-12-13

## 2018-10-29 RX ORDER — CYCLOBENZAPRINE HCL 10 MG
10 TABLET ORAL
COMMUNITY
Start: 2018-10-19 | End: 2018-10-29

## 2018-10-29 NOTE — PATIENT INSTRUCTIONS
Referral for ( TEST )  :      Orthopedics   LOCATION/PLACE/Provider :    [facility] - [place]   DATE & TIME :     11-1-2018 at 3:30  PHONE :     [phone]  FAX :     [phone]  Appointment made by clinic staff/:    Prachi

## 2018-10-29 NOTE — MR AVS SNAPSHOT
After Visit Summary   10/29/2018    Sushila Flores    MRN: [id number]           Patient Information     Date Of Birth          1960        Visit Information        Provider Department      10/29/2018 11:00 AM Noemi Coughlin MD [facility]        Today's Diagnoses     Chronic left-sided low back pain with left-sided sciatica    -  1      Care Instructions    Referral for ( TEST )  :      Orthopedics   LOCATION/PLACE/Provider :    [facility]   DATE & TIME :     11-1-2018 at 3:30  PHONE :     [phone]  FAX :     [phone]  Appointment made by clinic staff/:    Prachi            Follow-ups after your visit        Additional Services     ORTHOPEDICS ADULT REFERRAL       Patient is in room number: 17    Reason for Referral: Spine, seen by  spine previously and would like a second opinion  Referral Location: [facility]: [phone]     needed: No  Language: English    May leave message on voicemail: Yes    ([facility]) Referral should be tracked (Yes/No)? no                  Follow-up notes from your care team     Return in about 4 weeks (around 11/26/2018).      Your next 10 appointments already scheduled     Nov 19, 2018  2:20 PM CST   PHYSICAL with Noemi Coughlin MD   [facility] ([facility])    [address] [place] [address]   [phone]              Who to contact     Please call your clinic at [phone] to:    Ask questions about your health    Make or cancel appointments    Discuss your medicines    Learn about your test results    Speak to your doctor            Additional Information About Your Visit        MyChart Information     [employer] gives you secure access to your electronic health record. If you see a primary care provider, you can also send messages to your care team and make appointments. If you have questions, please call your primary care clinic.  If you do not have a primary care provider,  "please call [phone] and they will assist you.      [employer] is an electronic gateway that provides easy, online access to your medical records. With [employer], you can request a clinic appointment, read your test results, renew a prescription or communicate with your care team.     To access your existing account, please contact your [facility] or call [phone] for assistance.        Care EveryWhere ID     This is your Care EveryWhere ID. This could be used by other organizations to access your [facility] medical records  [id number]        Your Vitals Were     Pulse Temperature Respirations Height Pulse Oximetry BMI (Body Mass Index)    93 98.3  F (36.8  C) 18 5' 3\" (160 cm) 100% 34.72 kg/m2       Blood Pressure from Last 3 Encounters:   10/29/18 117/76   05/30/18 113/78   12/08/17 125/75    Weight from Last 3 Encounters:   10/29/18 196 lb (88.9 kg)   05/30/18 196 lb (88.9 kg)   12/08/17 199 lb (90.3 kg)                 Today's Medication Changes          These changes are accurate as of 10/29/18 11:59 PM.  If you have any questions, ask your nurse or doctor.               These medicines have changed or have updated prescriptions.        Dose/Directions    cyclobenzaprine 10 MG tablet   Commonly known as:  FLEXERIL   This may have changed:    - when to take this  - reasons to take this   Used for:  Chronic left-sided low back pain with left-sided sciatica   Changed by:  Noemi Coughlin MD        Dose:  10 mg   Take 1 tablet (10 mg) by mouth 3 times daily as needed for muscle spasms   Quantity:  42 tablet   Refills:  0            Where to get your medicines      These medications were sent to [id number] - [place], [place] - [address], [place] [address]     Phone:  [phone]     cyclobenzaprine 10 MG tablet                Primary Care Provider Office Phone # Fax #    Noemi Coughlin [phone]       [facility] [address] [facility] [address]        Equal Access to Services     SAÚL DOVER : Hadii aad ku hadnanibalaji Ramos, waaxda fifi, qaybta jeffreymajed miguel, rosalia islasdeyarina daniels. So [facility] [phone].    ATENCIÓN: Si habla español, tiene a harris disposición servicios gratuitos de asistencia lingüística. Llame al [phone].    We comply with applicable federal civil rights laws and [place] laws. We do not discriminate on the basis of race, color, national origin, age, disability, sex, sexual orientation, or gender identity.            Thank you!     Thank you for choosing [facility]  for your care. Our goal is always to provide you with excellent care. Hearing back from our patients is one way we can continue to improve our services. Please take a few minutes to complete the written survey that you may receive in the mail after your visit with us. Thank you!             Your Updated Medication List - Protect others around you: Learn how to safely use, store and throw away your medicines at www.disposemymeds.org.          This list is accurate as of 10/29/18 11:59 PM.  Always use your most recent med list.                   Brand Name Dispense Instructions for use Diagnosis    acetaminophen 500 MG tablet    TYLENOL    180 tablet    Take 2 tablets (1,000 mg) by mouth 2 times daily    Sciatic nerve pain, left       alum & mag hydroxide-simethicone 200-200-20 MG/5ML Susp suspension    MYLANTA/MAALOX    148 mL    Take 30 mLs by mouth every 4 hours as needed for indigestion    Gastroesophageal reflux disease, esophagitis presence not specified       atorvastatin 80 MG tablet    LIPITOR    90 tablet    Take 1 tablet (80 mg) by mouth daily    Type 2 diabetes mellitus without complication, without long-term current use of insulin (H), Candidate for statin therapy due to risk of future cardiovascular event       BLOOD GLUCOSE METER DISPOSABLE Joyce     1 each    Dispense per preferred. Test  sugars as needed up to 1 per day. Dx250.0 Strips and lancets #90    Type 2 diabetes mellitus without complication, without long-term current use of insulin (H)       blood glucose monitoring lancets     100 each    Use to test blood sugar 1 times daily or as directed.    Type 2 diabetes mellitus without complication, without long-term current use of insulin (H)       blood glucose monitoring test strip    no brand specified    100 strip    Use to test blood sugars 1-2 times daily or as directed    Type 2 diabetes mellitus without complication, without long-term current use of insulin (H)       cyclobenzaprine 10 MG tablet    FLEXERIL    42 tablet    Take 1 tablet (10 mg) by mouth 3 times daily as needed for muscle spasms    Chronic left-sided low back pain with left-sided sciatica       metFORMIN 500 MG 24 hr tablet    GLUCOPHAGE-XR    60 tablet    Take 1 tablet (500 mg) by mouth 2 times daily (with meals)    Type 2 diabetes mellitus without complication, without long-term current use of insulin (H)       order for DME     1 Device    Electronic blood pressure cuff - check blood pressure 2-3 times weekly    Type 2 diabetes mellitus without complication, without long-term current use of insulin (H)       polyethylene glycol powder    MIRALAX    510 g    Take 17 g (1 capful) by mouth daily    Constipation, unspecified constipation type       ranitidine 150 MG tablet    ZANTAC    60 tablet    Take 1 tablet (150 mg) by mouth 2 times daily    Gastroesophageal reflux disease, esophagitis presence not specified       senna-docusate 8.6-50 MG per tablet    SENOKOT-S;PERICOLACE    180 tablet    Take 1 tablet by mouth 2 times daily    Constipation, unspecified constipation type

## 2018-10-29 NOTE — PROGRESS NOTES
Chief Complaint   Patient presents with     ER F/U     [place], Care Everywhere signed     Medication Reconciliation     Needs attention. Not taking all meds on list       S:  Sushila Flores is a 57 year old year old female who  has a past medical history of Bilateral carpal tunnel syndrome (12/24/2015); Chronic back pain (8/20/2014); and Osteoarthritis (12/24/2015). who presents to discuss:    1. 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   Past Medical History:   Diagnosis Date     Bilateral carpal tunnel syndrome 12/24/2015     Chronic back pain 8/20/2014     Osteoarthritis 12/24/2015     No past surgical history on file.  Family History   Problem Relation Age of Onset     Diabetes Brother      Diabetes Maternal Grandmother      Hypertension Sister      Other Cancer Maternal Grandfather      Breast Cancer Maternal Aunt      Breast Cancer Paternal Aunt      Other Cancer Paternal Grandmother      stomach cancer     Asthma No family hx of      Social History     Social History     Marital status: Single     Spouse name: N/A     Number of children: N/A     Years of education: N/A     Occupational History     Not on file.     Social History Main Topics     Smoking status: Former Smoker     Types: Cigarettes     Quit date: 8/1/2013     Smokeless tobacco: Never Used     Alcohol use 0.0 oz/week     0 Standard drinks or equivalent per week      Comment: ocassionally     Drug use: No     Sexual activity: Not on file     Other Topics Concern     Not on file  "    Social History Narrative    Works at [employer]. 4 children, one set of twins. 3 grandkids.       O:  Vitals: /76  Pulse 93  Temp 98.3  F (36.8  C)  Resp 18  Ht 5' 3\" (160 cm)  Wt 196 lb (88.9 kg)  SpO2 100%  BMI 34.72 kg/m2    General: Alert, well-appearing, no acute distress  HEENT: PERRL, moist oral mucus membranes  Pulm: clear to auscultation bilaterally, no tachypnea  CV: RRR, no murmur  Abd: soft, non-tender, non-distended, no masses, no guarding no rebound  Ext: Warm, well perfused, no LE edema  Skin: No rash on limited skin exam  Neuro: no point tenderness along spine. Extremities 5/5 strength, negative straight leg raise  Psych: Mood appropriate to visit content, full affect, rational thought content and process      A/P:  1. Chronic left-sided low back pain with left-sided sciatica  Reviewed notes from [facility] spine recommending surgical management of instability and disc herniation L4-5 and L5-S1. Encouraged patient to follow up with spine team ASAP, she would like a second opinion will refer to [facility]. Discussed my concern with her ongoing incontinence issues that she may need surgery to protect her spinal cord. If any changes, new symptoms or worsening of pain would return to ER. Will treat symptomatically while awaiting referral. Defer to ortho on imaging spine.   - cyclobenzaprine (FLEXERIL) 10 MG tablet; Take 1 tablet (10 mg) by mouth 3 times daily as needed for muscle spasms  Dispense: 42 tablet; Refill: 0  - ORTHOPEDICS ADULT REFERRAL; Future    Noemi Coughlin MD  [facility] Medicine Resident  Pager     Precepted with: Nikki Nathan MD  "

## 2018-11-05 NOTE — PROGRESS NOTES
Preceptor Attestation:  Patient's case reviewed and discussed with  Patient seen and discussed with the resident..  I agree with written assessment and plan of care.  Supervising Physician:  Katerine Nathan MD  [facility]

## 2018-11-16 ENCOUNTER — RECORDS - HEALTHEAST (OUTPATIENT)
Dept: ADMINISTRATIVE | Facility: OTHER | Age: 58
End: 2018-11-16

## 2018-12-13 DIAGNOSIS — M54.42 CHRONIC LEFT-SIDED LOW BACK PAIN WITH LEFT-SIDED SCIATICA: ICD-10-CM

## 2018-12-13 DIAGNOSIS — G89.29 CHRONIC LEFT-SIDED LOW BACK PAIN WITH LEFT-SIDED SCIATICA: ICD-10-CM

## 2018-12-13 RX ORDER — CYCLOBENZAPRINE HCL 10 MG
10 TABLET ORAL 3 TIMES DAILY PRN
Qty: 42 TABLET | Refills: 1 | Status: SHIPPED | OUTPATIENT
Start: 2018-12-13 | End: 2019-03-26

## 2019-01-10 NOTE — PROGRESS NOTES
Annual Wellness Visit for 65 years and older    HPI  This 58 year old female presents as an established patient  Noemi Coughlin who presents for an annual wellness visit.    Other issues patient wants to be addressed today:  Chief Complaint   Patient presents with     Annual Visit     check for bump on bottom. Painful.      Medication Reconciliation     Reviewed but needs attention. Would like refills on ranitidine.        Patient Active Problem List   Diagnosis     Sciatic nerve pain     Vitamin D deficiency     Chronic back pain     Bilateral carpal tunnel syndrome     Osteoarthritis of multiple joints     Pap smear for cervical cancer screening     Spondylosis of cervical region without myelopathy or radiculopathy     BMI 36.0-36.9,adult     Candidate for statin therapy due to risk of future cardiovascular event     Type 2 diabetes mellitus without complication (H)     Positive FIT (fecal immunochemical test)     Special screening for malignant neoplasms, colon     Past Medical History:   Diagnosis Date     Bilateral carpal tunnel syndrome 12/24/2015     Chronic back pain 8/20/2014     Osteoarthritis 12/24/2015       Family History   Problem Relation Age of Onset     Diabetes Brother      Diabetes Maternal Grandmother      Hypertension Sister      Other Cancer Maternal Grandfather      Breast Cancer Maternal Aunt      Breast Cancer Paternal Aunt      Other Cancer Paternal Grandmother         stomach cancer     Asthma No family hx of      Problem List, Family History and past Medical History reviewed and  unchanged/updated.    Nursing Notes:   Sonia Molina RN  1/11/2019  9:43 AM  Signed  Medicare Wellness Visit  Health Risk Assessment           Health Risk Assessment / Review of Systems     Constitutional: Any fevers or night sweats?  YES hot flashes day and night daily-causes dizziness    Eyes:  Vision problems   Yes, wears glasses and stated she needs a recheck because it is getting difficult to see again  "    Hearing Do you feel you have hearing loss?  No     Cardiovascular: Any chest pain, fast or irregular heart beat, calf pain with walking?      No          Respiratory:   Any breathing problems or cough?    YES cough in Am upon waking    Gastrointestinal: Any stomach or stool problems?   Yes constipation, takes fiber and is effective    Genitourinary: Do you have difficulty controlling urination?    YES leaking, leaks after voiing. Wears liners    Muscles and Joints: Any joint stiffness or soreness?    YES bilateral hands, bilateral knees, shoulders, neck pain r/t computer work    Skin: Any concerning lesions or moles?    YES \"knot on coccyx\" causes pain while sitting for extended time, no drainage    Nervous System: Any loss of strength or feeling, numbness or tingling, shaking, dizziness, or headache?   YES numbness in toes left, tingling on left side from back down to foot. \"Electric feeling in bottom of foot, has to shake out\"    Mental Health: Any depression, anxiety or problems sleeping?     YES depression r/t pain, sleeping difficulties r/t pain; patient weepy during assessment    Cognition: Do you have any problems with your memory?  No            Medical Care     What other specialists or organizations are involved in your medical care?    Patient Care Team       Relationship Specialty Notifications Start End    Noemi Coughlin MD PCP - General Student in organized health care education/training program  6/30/17     Phone: [phone] Fax: [phone]         [facility] [facility] [address]          Have you been to the ER or overnight in the hospital in the last year?   YES 10/19/18 emergency room r/t back pain         Social History / Home Safety       Marital Status:Single  Who lives in your household? self    Do you feel threatened or controlled by a partner, ex-partner or anyone in your life? No     Has anyone hurt you physically, for example by pushing, hitting, slapping or kicking you   or " forcing you to have sex? No          Does your home have any of the following safety concerns; loose rugs in the hallway,  bathrooms with no grab bars by the tub or toilet, stairs with no handrails or poorly lit areas?  No     Do you need help with dressing yourself, bathing, eating or getting around your home?  No     Do you need help with the phone, transportation, shopping, preparing meals, housework, laundry, medications or managing money?No       Risk Behaviors and Healthy Habits     History   Smoking Status     Former Smoker     Types: Cigarettes     Quit date: 2013   Smokeless Tobacco     Never Used     How many servings of fruits and vegetables do you eat a day? 2/day education provided on 5/day    Exercise: 6-7 days/week for an average of 15-30 minutes stretching     Do you frequently drive without a seatbelt? No     Do you use tobacco?  No     Do you use any other drugs? No         Do you use alcohol?No      Frailty Assessment            Have you lost 10 or more pounds unintentionally in the previous year? No     How difficult is walking from one room to the other on the same level?not   No     Is it difficult to lift or carry something as heavy as 10 pounds?not   No    Compared with most (men/women) your age, would you say  that you are more active, less active, or about the same? less   Yes r/t pain    TU seconds, patient verbalized increasing pain during TUG  FALL RISK ASSESSMENT 2019   Fallen 2 or more times in the past year? No   Any fall with injury in the past year? No         Advance Directives:   Discussed with patient and family as appropriate. Has patient  completed advance directives and/or a living will? no  Given blank copy, advised to go over with family members at home and bring to clinic fo agnes and any questions/concerns. Patient verbalized understanding.     JOSHUA Tai Cheng, CMA  2019  9:41 AM  Signed  Chief Complaint   Patient presents with      "Annual Visit     check for bump on bottom. Painful.      Medication Reconciliation     Reviewed but needs attention. Would like refills on ranitidine.        /79   Pulse 92   Temp 98  F (36.7  C) (Oral)   Resp 20   Ht 1.619 m (5' 3.75\")   Wt 89.7 kg (197 lb 12.8 oz)   SpO2 100%   BMI 34.22 kg/m         Back Pain:   -follow up from last visit. Has seen [facility] for second opinion, imaging was done and surgery was not recommended at this time, she is unsure if there were other recommendations for management  -continues to have debilitating back pain  -upper back, midline, chronic for many years   -interested in spinal injections would like referral to [facility]  -wonders if breast reduction surgery would be helpful. Has to wear 2 bras, has been recommended to her before. She is willing to look into this at this point as her quality of life is impeded by this back pain.     Are you sexually active?  Yes, after 6 years   If yes, with men, women, or both? Male  If yes, do you more than one current partner?No  If yes, are you using condoms?  No  Have you had any sexually transmitted infections? No   Any sexual concerns? No     FOR WOMEN  What year did you stop having periods? 6 years ago  Any vaginal bleeding in the last year? No  Have you ever had an abnormal Pap smear? Yes in her 40s but last one was normal in 2015      Frailty Assessment  1. Weight loss (>5% in year)  No  Wt Readings from Last 5 Encounters:   01/11/19 89.7 kg (197 lb 12.8 oz)   10/29/18 88.9 kg (196 lb)   05/30/18 88.9 kg (196 lb)   12/08/17 90.3 kg (199 lb)   11/08/17 87.7 kg (193 lb 6.4 oz)       2. Exhaustion (perceived effort for a given activity)   How difficult is walking from one room to the other on the same level?moderately   Yes     3. Weakness (handgrip strength)   How difficult is lifting or carrying something as heavy as 10 pounds? severely   Yes    4. Decreased physical activity    Compared with most (men/women) " your age, would you say  that you are more active, less active, or about the same? less   Yes    5. Slow gait speed (timed up and go > 12 sec.)  No  FALL RISK ASSESSMENT 2019   Fallen 2 or more times in the past year? No   Any fall with injury in the past year? No         Frailty screen score: 3    Frail Assessment:3-5 Frail-- Note: I think this score is largely due to back pain- most of patients answers were related to pain impairing ability rather than actual difficulty of task.       EVALUATION OF COGNITIVE FUNCTION  Mood/affect:Normal  Appearance:Normal  Family member/caregiver input: not present    PHQ-2 Score:   PHQ-2 (  [employer]) 2019 10/29/2018   Q1: Little interest or pleasure in doing things 1 0   Q2: Feeling down, depressed or hopeless 1 0   PHQ-2 Score 2 0       PHQ-9 Score:   [facility] Follow-up to PHQ 3/17/2017 3/17/2017 2018   PHQ-9 9. Suicide Ideation past 2 weeks Not at all Not at all Not at all       Mini Cog Test:      Recall result:  3 points   Clock Draw Test result: Normal    Cognitive screen is:Negative      Other Assessments:  CV Risk based on Pooled Cohort Risk The 10-year ASCVD risk score (Castillo HUERTA Jr., et al., 2013) is: 8.6%    Values used to calculate the score:      Age: 58 years      Sex: Female      Is Non- : Yes      Diabetic: Yes      Tobacco smoker: No      Systolic Blood Pressure: 135 mmHg      Is BP treated: No      HDL Cholesterol: 58 mg/dL      Total Cholesterol: 180 mg/dL    The 10-year ASCVD risk score (Castillo HUERTA Jr., et al., 2013) is: 8.6%    Values used to calculate the score:      Age: 58 years      Sex: Female      Is Non- : Yes      Diabetic: Yes      Tobacco smoker: No      Systolic Blood Pressure: 135 mmHg      Is BP treated: No      HDL Cholesterol: 58 mg/dL      Total Cholesterol: 180 mg/dL      Breast CA Screenin-2 years 50-74years:  Recommended and patient accepted testing.    ECG (if done)not  "performed    Immunization History   Administered Date(s) Administered     Influenza (IIV3) PF 09/16/2009, 10/06/2011, 09/01/2013     Pneumococcal 23 valent 10/08/2013     TDAP Vaccine (Boostrix) 03/17/2017     Tdap (Adacel,Boostrix) 05/08/2006     Reviewed Immunization Record Today  Physical Exam    Vitals: /79   Pulse 92   Temp 98  F (36.7  C) (Oral)   Resp 20   Ht 1.619 m (5' 3.75\")   Wt 89.7 kg (197 lb 12.8 oz)   SpO2 100%   BMI 34.22 kg/m    BMI= Body mass index is 34.22 kg/m .  EXAM:  Constitutional: tearful, moves slowly as if in pain, walks with significant hunching of shoulders   MSK: back with ROM limited by pain, notching of shoulders noted with bra straps bilaterally    Assessment and Plan:  -Up to date for pap and colon cancer  -mammogram needed  -lung cancer screening CT  ordered  -referral to spine and plastics for ongoing back issues, consideration of breast reduction surgery.     Reviewed Preventive Services and Plan form with patient as specified in  Patient Instructions.  Positive findings on assessment:     Lung Cancer Screening Shared Decision Making Visit     Sushila Flores is eligible for lung cancer screening on the basis of: smoking history   has not not experienced symptoms suggestive of lung cancer.   born on 1960, 58 year old years old.   smoked 1 packs of cigarettes for 30 years for a total of 30 pack-years   has quit smoking earlier this year    1. Personal history of tobacco use  - CT Chest Lung Cancer Scrn Low Dose wo; Future    2. Chronic back pain, unspecified back location, unspecified back pain laterality  - [facility] SPINE CARE REFERRAL; Future  - PLASTIC SURGERY REFERRAL; Future    3. Encounter for screening mammogram for breast cancer  - [facility] SCREENING DIGITAL BILAT; Future    Options for treatment and follow-up care were reviewed with the Sushila lFores  and/or guardian engaged in the decision making process and verbalized  understanding of the options discussed " and agreed with the final plan.  Noemi Coughlin MD    Precepted with Dr. Hunt.

## 2019-01-11 ENCOUNTER — OFFICE VISIT (OUTPATIENT)
Dept: FAMILY MEDICINE | Facility: CLINIC | Age: 59
End: 2019-01-11
Payer: MEDICARE

## 2019-01-11 ENCOUNTER — RECORDS - HEALTHEAST (OUTPATIENT)
Dept: ADMINISTRATIVE | Facility: OTHER | Age: 59
End: 2019-01-11

## 2019-01-11 VITALS
SYSTOLIC BLOOD PRESSURE: 135 MMHG | TEMPERATURE: 98 F | OXYGEN SATURATION: 100 % | RESPIRATION RATE: 20 BRPM | BODY MASS INDEX: 33.77 KG/M2 | DIASTOLIC BLOOD PRESSURE: 79 MMHG | HEIGHT: 64 IN | HEART RATE: 92 BPM | WEIGHT: 197.8 LBS

## 2019-01-11 DIAGNOSIS — M54.9 CHRONIC BACK PAIN, UNSPECIFIED BACK LOCATION, UNSPECIFIED BACK PAIN LATERALITY: ICD-10-CM

## 2019-01-11 DIAGNOSIS — Z00.00 WELLNESS EXAMINATION: Primary | ICD-10-CM

## 2019-01-11 DIAGNOSIS — Z87.891 PERSONAL HISTORY OF TOBACCO USE: Primary | ICD-10-CM

## 2019-01-11 DIAGNOSIS — Z12.31 ENCOUNTER FOR SCREENING MAMMOGRAM FOR BREAST CANCER: ICD-10-CM

## 2019-01-11 DIAGNOSIS — G89.29 CHRONIC BACK PAIN, UNSPECIFIED BACK LOCATION, UNSPECIFIED BACK PAIN LATERALITY: ICD-10-CM

## 2019-01-11 ASSESSMENT — MIFFLIN-ST. JEOR: SCORE: 1468.24

## 2019-01-11 NOTE — PATIENT INSTRUCTIONS
PERSONAL PREVENTIVE SERVICES PLAN - SERVICES     Review these tests with your medical staff then decide which ones you want and take this page home for your reference      SCREENING TESTS     Description   Year of Last Screening   Recommended Today?   Heart disease screening blood tests    Cholesterol level Reducing cholesterol can reduce your risk of heart attacks by 25%.  Screening is recommended yearly if you are at risk of heart disease otherwise every 4-5 years 5/2018 No; is up to date.   Diabetes screening tests    Hemoglobin A1c blood test   Finding and treating diabetes early can reduce complications.  Screening recommended/covered yearly if you have high blood pressure, high cholesterol, obesity (BMI >30), or a history of high blood glucose tests; or 2 of the following: family history of diabetes, overweight (BMI >25 but <30), age 65 years or older, and a history of diabetes of pregnancy or gave birth to baby weighing more than 9 lbs. 5/2018 No; is up to date.   Hepatitis B screening Finding hepatitis B early can reduce complications.  Screening is recommended for persons with selected risk factors. - No: is not indicated today.   Hepatitis C screening Finding hepatitis C early can reduce complications.  Screening is recommended for all persons born from 1945 through 1965 and for those with selected other risk factors.  3/2017 No; is up to date.   HIV screening Finding HIV early can reduce complications.  Screening is recommended for persons with risk factors for HIV infection. 5/2018 No; is up to date.   Glaucoma screening Early detection of glaucoma can prevent blindness.   Please talk to your eye doctor about this.       SCREENING TESTS     Description   Year of Last Screening   Recommended Today?   Colorectal cancer screening    Fecal occult blood test     Screening colonoscopy Screening for colon cancer has been shown to reduce death from colon cancer by 25-30%. Screening recommended to start at 50  years and continuing until age 75 years.   6/2017 No; is up to date.   Breast Cancer Screening (women)    Mammogram Mammogram screening for breast cancer has been shown to reduce the risk of dying from breast cancer and prolong life. Screening is recommended every 1-2 years for women aged 50 to 74 years.  2014 Yes; Recommended    Cervical Cancer screening (women)    Pap Cervical pap smears can reduce cervical cancer. Screening is recommended annually if high risk (history of abnormal pap smears) otherwise every 2-3 years, stop screening at 65 years of age if history of normal paps. 2015 Yes; Recommended    Screening for Osteoporosis:  Bone mass measurements (women)    Dexa Scan Screening and treating Osteoporosis can reduce the risk of hip and spine fractures. Screening is recommended in women 65 years or older and in women and men at risk of osteoporosis. - No: is not indicated today.   Screening for Lung Cancer     Low-dose CT scanning Screening can reduce mortality in persons aged 55-80 who have smoked at least 30 pack-years and who are either still smoking or have quit in the past 15 years. - Yes; Recommended    Abdominal Aortic Aneurysm (AAA) screening    Ultrasound (US)   An aneurysm treated before rupture is very safe -a ruptured aneurysm can be fatal.  Screening  by US for AAA is limited to patients who meet one of the following criteria:    Men who are 65-75 years old and have smoked more than 100 cigarettes in their lifetime    Anyone with a family history of abdominal aortic aneurysm - No: is not indicated today.     Here are your recommended immunizations.  Take this home for your reference.                                                    IMMUNIZATIONS Description Recommend today?     Influenza (Flu shot) Prevents flu; should get every year Yes; Recommended    PCV 13 Pneumonia vaccination; you get it once Yes; Recommended    PPSV 23 Second pneumonia vaccination; usually get it 1 year after PCV 13 No:  is not indicated today.   Zoster (Shingles) Prevents shingles; you get it once  (Check with Part D insurance for coverage, must receive at a pharmacy, not clinic) Yes; Recommended    Tetanus Prevents tetanus; once every 10 years No; is up to date.     Hepatitis B  If you have any of the following risk factors you should be immunized for hepatitis B: severe kidney disease, people who live in the same house as a carrier of Hepatitis B virus, people who live in  institutions (e.g. nursing homes or group homes), homosexual men, patients with hemophilia who received Factor VIII or IX concentrates, abusers of illicit injectable drugs No: is not indicated today.      PATIENT INSTRUCTIONS    Yearly exam:     See your health care provider every year in order to review changes in your health, review medicines that you take, and discuss preventive care needs such as immunizations and cancer screening.    Get a flu shot each year.     Advance Directives:    If you have not done so, you are encouraged to complete advance directives and/or a living will.   More information about advance directives can be found at: http://www.[place]med.org/advocacy/Key-Issues/Advance-Directives    Nutrition:     Eat at least 5 servings of fruits and vegetables each day.     Eat whole-grain bread, whole-wheat pasta and brown rice instead of white grains and rice.     Talk to your doctor about Calcium and Vitamin D.     Lifestyle:    Exercise for at least 150 minutes a week (30 minutes a day, 5 days a week). This will help you control your weight and prevent disease.     Limit alcohol to one drink per day.     If you smoke, try to quit - your doctor will be happy to help.     Wear sunscreen to prevent skin cancer.     See your dentist every six months for an exam and cleaning.     See your eye doctor every 1 to 2 years to screen for conditions such as glaucoma, macular degeneration and cataracts.                              Lung Cancer Screening    Frequently Asked Questions  If you are at high-risk for lung cancer, getting screened with low-dose computed tomography (LDCT) every year can help save your life. This handout offers answers to some of the most common questions about lung cancer screening. If you have other questions, please call [phone] ([phone]).     What is it?  Lung cancer screening uses special X-ray technology to create an image of your lung tissue. The exam is quick and easy and takes less than 10 seconds. We don t give you any medicine or use any needles. You can eat before and after the exam. You don t need to change your clothes as long as the clothing on your chest doesn t contain metal. But, you do need to be able to hold your breath for at least 6 seconds during the exam.    What is the goal of lung cancer screening?  The goal of lung cancer screening is to save lives. Many times, lung cancer is not found until a person starts having physical symptoms. Lung cancer screening can help detect lung cancer in the earliest stages when it may be easier to treat.    Who should be screened for lung cancer?  We suggest lung cancer screening for anyone who is at high-risk for lung cancer. You are in the high-risk group if you:      are between the ages of 55 and 79, and    have smoked at least 1 pack of cigarettes a day for 30 or more years, and    still smoke or have quit within the past 15 years.    However, if you have a new cough or shortness of breath, you should talk to your doctor before being screened.    Some national lung health advocacy groups also recommend screening for people ages 50 to 79 who have smoked an average of 1 pack of cigarettes a day for 20 years. They must also have at least 1 other risk factor for lung cancer, not including exposure to secondhand smoke. Other risk factors are having had cancer in the past, emphysema, pulmonary fibrosis, COPD, a family history of lung cancer, or exposure to certain  materials such as arsenic, asbestos, beryllium, cadmium, chromium, diesel fumes, nickel, radon or silica. Your care team can help you know if you have one of these risk factors.     Why does it matter if I have symptoms?  Certain symptoms can be a sign that you have a condition in your lungs that should be checked and treated by your doctor. These symptoms include fever, chest pain, a new or changing cough, shortness of breath that you have never felt before, coughing up blood or unexplained weight loss. Having any of these symptoms can greatly affect the results of lung cancer screening.       Should all smokers get an LDCT lung cancer screening exam?  It depends. Lung cancer screening is for a very specific group of men and women who have a history of heavy smoking over a long period of time (see  Who should be screened for lung cancer  above).  I am in the high-risk group, but have been diagnosed with cancer in the past. Is LDCT lung cancer screening right for me?  In some cases, you should not have LDCT lung screening, such as when your doctor is already following your cancer with CT scan studies. Your doctor will help you decide if LDCT lung screening is right for you.  Do I need to have a screening exam every year?  Yes. If you are in the high-risk group described earlier, you should get an LDCT lung cancer screening exam every year until you are 79, or are no longer willing or able to undergo screening and possible procedures to diagnose and treat lung cancer.  How effective is LDCT at preventing death from lung cancer?  Studies have shown that LDCT lung cancer screening can lower the risk of death from lung cancer by 20 percent in people who are at high-risk.  What are the risks?  There are some risks and limitations of LDCT lung cancer screening. We want to make sure you understand the risks and benefits, so please let us know if you have any questions. Your doctor may want to talk with you more about these  risks.    Radiation exposure: As with any exam that uses radiation, there is a very small increased risk of cancer. The amount of radiation in LDCT is small--about the same amount a person would get from a mammogram. Your doctor orders the exam when he or she feels the potential benefits outweigh the risks.    False negatives: No test is perfect, including LDCT. It is possible that you may have a medical condition, including lung cancer, that is not found during your exam. This is called a false negative result.    False positives and more testing: LDCT very often finds something in the lung that could be cancer, but in fact is not. This is called a false positive result. False positive tests often cause anxiety. To make sure these findings are not cancer, you may need to have more tests. These tests will be done only if you give us permission. Sometimes patients need a treatment that can have side effects, such as a biopsy. For more information on false positives, see  What can I expect from the results?     Findings not related to lung cancer: Your LDCT exam also takes pictures of areas of your body next to your lungs. In a very small number of cases, the CT scan will show an abnormal finding in one of these areas, such as your kidneys, adrenal glands, liver or thyroid. This finding may not be serious, but you may need more tests. Your doctor can help you decide what other tests you may need, if any.  What can I expect from the results?  About 1 out of 4 LDCT exams will find something that may need more tests. Most of the time, these findings are lung nodules. Lung nodules are very small collections of tissue in the lung. These nodules are very common, and the vast majority--more than 97 percent--are not cancer (benign). Most are normal lymph nodes or small areas of scarring from past infections.  But, if a small lung nodule is found to be cancer, the cancer can be cured more than 90 percent of the time. To know  if the nodule is cancer, we may need to get more images before your next yearly screening exam. If the nodule has suspicious features (for example, it is large, has an odd shape or grows over time), we will refer you to a specialist for further testing.  Will my doctor also get the results?  Yes. Your doctor will get a copy of your results.  Is it okay to keep smoking now that there s a cancer screening exam?  No. Tobacco is one of the strongest cancer-causing agents. It causes not only lung cancer, but other cancers and cardiovascular (heart) diseases as well. The damage caused by smoking builds over time. This means that the longer you smoke, the higher your risk of disease. While it is never too late to quit, the sooner you quit, the better.  Where can I find help to quit smoking?  The best way to prevent lung cancer is to stop smoking. If you have already quit smoking, congratulations and keep it up! For help on quitting smoking, please call [facility] at 0-867-914-TGIR ([phone]) or the American Cancer Society at 1-909.180.6988 to find local resources near you.  One-on-one health coaching:  If you d prefer to work individually with a health care provider on tobacco cessation, we offer:      Medication Therapy Management:  Our specially trained pharmacists work closely with you and your doctor to help you quit smoking.  Call [phone] or [phone] (toll free).     Can Do: Health coaching offered by [facility].  [email or web address]      Referral for ( TEST )  :      Mammogram  LOCATION/PLACE/Provider :    [facility]   DATE & TIME :     1- at 12:30  PHONE :     [phone]  FAX :     [phone]  Appointment made by clinic staff/:    Prachi      Referral for ( TEST )  :      [facility]   LOCATION/PLACE/Provider :    [facility] [address], [facility] [address], [place] [address]  DATE & TIME :     To be determined   PHONE :  [phone]  FAX :      [phone]  ADDITIONAL INFORMATION :     Patient will be contacted to schedule appointment

## 2019-01-11 NOTE — NURSING NOTE
"Medicare Wellness Visit  Health Risk Assessment           Health Risk Assessment / Review of Systems     Constitutional: Any fevers or night sweats?  YES hot flashes day and night daily-causes dizziness    Eyes:  Vision problems   Yes, wears glasses and stated she needs a recheck because it is getting difficult to see again     Hearing Do you feel you have hearing loss?  No     Cardiovascular: Any chest pain, fast or irregular heart beat, calf pain with walking?      No          Respiratory:   Any breathing problems or cough?    YES cough in Am upon waking    Gastrointestinal: Any stomach or stool problems?   Yes constipation, takes fiber and is effective    Genitourinary: Do you have difficulty controlling urination?    YES leaking, leaks after voiing. Wears liners    Muscles and Joints: Any joint stiffness or soreness?    YES bilateral hands, bilateral knees, shoulders, neck pain r/t computer work    Skin: Any concerning lesions or moles?    YES \"knot on coccyx\" causes pain while sitting for extended time, no drainage    Nervous System: Any loss of strength or feeling, numbness or tingling, shaking, dizziness, or headache?   YES numbness in toes left, tingling on left side from back down to foot. \"Electric feeling in bottom of foot, has to shake out\"    Mental Health: Any depression, anxiety or problems sleeping?     YES depression r/t pain, sleeping difficulties r/t pain; patient weepy during assessment    Cognition: Do you have any problems with your memory?  No            Medical Care     What other specialists or organizations are involved in your medical care?    Patient Care Team       Relationship Specialty Notifications Start End    Noemi Coughlin MD PCP - General Student in organized health care education/training program  6/30/17     Phone: [phone] Fax: [phone]         [facility] [place] [address]          Have you been to the ER or overnight in the hospital in the last year?   YES " 10/19/18 emergency room r/t back pain         Social History / Home Safety       Marital Status:Single  Who lives in your household? self    Do you feel threatened or controlled by a partner, ex-partner or anyone in your life? No     Has anyone hurt you physically, for example by pushing, hitting, slapping or kicking you   or forcing you to have sex? No          Does your home have any of the following safety concerns; loose rugs in the hallway,  bathrooms with no grab bars by the tub or toilet, stairs with no handrails or poorly lit areas?  No     Do you need help with dressing yourself, bathing, eating or getting around your home?  No     Do you need help with the phone, transportation, shopping, preparing meals, housework, laundry, medications or managing money?No       Risk Behaviors and Healthy Habits     History   Smoking Status     Former Smoker     Types: Cigarettes     Quit date: 2013   Smokeless Tobacco     Never Used     How many servings of fruits and vegetables do you eat a day? 2/day education provided on 5/day    Exercise: 6-7 days/week for an average of 15-30 minutes stretching     Do you frequently drive without a seatbelt? No     Do you use tobacco?  No     Do you use any other drugs? No         Do you use alcohol?No      Frailty Assessment            Have you lost 10 or more pounds unintentionally in the previous year? No     How difficult is walking from one room to the other on the same level?not   No     Is it difficult to lift or carry something as heavy as 10 pounds?not   No    Compared with most (men/women) your age, would you say  that you are more active, less active, or about the same? less   Yes r/t pain    TU seconds, patient verbalized increasing pain during TUG  FALL RISK ASSESSMENT 2019   Fallen 2 or more times in the past year? No   Any fall with injury in the past year? No         Advance Directives:   Discussed with patient and family as appropriate. Has  patient  completed advance directives and/or a living will? no  Given blank copy, advised to go over with family members at home and bring to clinic fo notary and any questions/concerns. Patient verbalized understanding.     Sonia Molina RN

## 2019-01-11 NOTE — NURSING NOTE
"Chief Complaint   Patient presents with     Annual Visit     check for bump on bottom. Painful.      Medication Reconciliation     Reviewed but needs attention. Would like refills on ranitidine.        /79   Pulse 92   Temp 98  F (36.7  C) (Oral)   Resp 20   Ht 1.619 m (5' 3.75\")   Wt 89.7 kg (197 lb 12.8 oz)   SpO2 100%   BMI 34.22 kg/m          "

## 2019-01-18 NOTE — PROGRESS NOTES
Preceptor Attestation:   Patient seen, evaluated and discussed with the resident. I have verified the content of the note, which accurately reflects my assessment of the patient and the plan of care.  Supervising Physician:Ruth Hunt MD  [facility]

## 2019-01-21 ENCOUNTER — HOSPITAL ENCOUNTER (OUTPATIENT)
Dept: MAMMOGRAPHY | Facility: CLINIC | Age: 59
Discharge: HOME | End: 2019-01-21

## 2019-01-21 DIAGNOSIS — Z12.31 VISIT FOR SCREENING MAMMOGRAM: ICD-10-CM

## 2019-01-23 ENCOUNTER — AMBULATORY - HEALTHEAST (OUTPATIENT)
Dept: PHYSICAL MEDICINE AND REHAB | Facility: CLINIC | Age: 59
End: 2019-01-23

## 2019-01-23 DIAGNOSIS — G89.29 CHRONIC LOW BACK PAIN: ICD-10-CM

## 2019-01-23 DIAGNOSIS — M54.9 CHRONIC BACK PAIN, UNSPECIFIED BACK LOCATION, UNSPECIFIED BACK PAIN LATERALITY: ICD-10-CM

## 2019-01-23 DIAGNOSIS — G89.29 CHRONIC BACK PAIN, UNSPECIFIED BACK LOCATION, UNSPECIFIED BACK PAIN LATERALITY: ICD-10-CM

## 2019-01-23 DIAGNOSIS — M54.50 CHRONIC LOW BACK PAIN: ICD-10-CM

## 2019-02-04 ENCOUNTER — HOSPITAL ENCOUNTER (OUTPATIENT)
Dept: PHYSICAL MEDICINE AND REHAB | Facility: CLINIC | Age: 59
Discharge: HOME | End: 2019-02-04
Attending: FAMILY MEDICINE

## 2019-02-04 DIAGNOSIS — M79.18 MYOFASCIAL PAIN: ICD-10-CM

## 2019-02-04 DIAGNOSIS — M48.062 SPINAL STENOSIS OF LUMBAR REGION WITH NEUROGENIC CLAUDICATION: ICD-10-CM

## 2019-02-04 DIAGNOSIS — G89.4 CHRONIC PAIN SYNDROME: ICD-10-CM

## 2019-02-04 DIAGNOSIS — M54.2 CERVICALGIA: ICD-10-CM

## 2019-02-04 DIAGNOSIS — M79.7 FIBROMYALGIA: ICD-10-CM

## 2019-02-04 ASSESSMENT — MIFFLIN-ST. JEOR: SCORE: 1438.4

## 2019-03-04 ENCOUNTER — AMBULATORY - HEALTHEAST (OUTPATIENT)
Dept: LAB | Facility: HOSPITAL | Age: 59
End: 2019-03-04

## 2019-03-04 ENCOUNTER — COMMUNICATION - HEALTHEAST (OUTPATIENT)
Dept: PHYSICAL MEDICINE AND REHAB | Facility: CLINIC | Age: 59
End: 2019-03-04

## 2019-03-04 DIAGNOSIS — M79.7 FIBROMYALGIA: ICD-10-CM

## 2019-03-04 DIAGNOSIS — M48.062 SPINAL STENOSIS OF LUMBAR REGION WITH NEUROGENIC CLAUDICATION: ICD-10-CM

## 2019-03-04 DIAGNOSIS — M79.18 MYOFASCIAL PAIN: ICD-10-CM

## 2019-03-04 DIAGNOSIS — G89.4 CHRONIC PAIN SYNDROME: ICD-10-CM

## 2019-03-04 DIAGNOSIS — M54.2 CERVICALGIA: ICD-10-CM

## 2019-03-04 LAB
C REACTIVE PROTEIN LHE: 0.1 MG/DL (ref 0–0.8)
ERYTHROCYTE [SEDIMENTATION RATE] IN BLOOD BY WESTERGREN METHOD: 16 MM/HR (ref 0–20)

## 2019-03-15 ENCOUNTER — OFFICE VISIT - HEALTHEAST (OUTPATIENT)
Dept: PHYSICAL THERAPY | Facility: REHABILITATION | Age: 59
End: 2019-03-15

## 2019-03-15 DIAGNOSIS — M54.50 CHRONIC BILATERAL LOW BACK PAIN WITHOUT SCIATICA: ICD-10-CM

## 2019-03-15 DIAGNOSIS — M62.81 MUSCLE WEAKNESS (GENERALIZED): ICD-10-CM

## 2019-03-15 DIAGNOSIS — M53.3 SACROILIAC JOINT DYSFUNCTION OF LEFT SIDE: ICD-10-CM

## 2019-03-15 DIAGNOSIS — G89.29 CHRONIC BILATERAL LOW BACK PAIN WITHOUT SCIATICA: ICD-10-CM

## 2019-03-22 ENCOUNTER — OFFICE VISIT - HEALTHEAST (OUTPATIENT)
Dept: PHYSICAL THERAPY | Facility: REHABILITATION | Age: 59
End: 2019-03-22

## 2019-03-22 ENCOUNTER — TELEPHONE (OUTPATIENT)
Dept: FAMILY MEDICINE | Facility: CLINIC | Age: 59
End: 2019-03-22

## 2019-03-22 DIAGNOSIS — G89.29 CHRONIC BILATERAL LOW BACK PAIN WITHOUT SCIATICA: ICD-10-CM

## 2019-03-22 DIAGNOSIS — M54.50 CHRONIC BILATERAL LOW BACK PAIN WITHOUT SCIATICA: ICD-10-CM

## 2019-03-22 DIAGNOSIS — M62.81 MUSCLE WEAKNESS (GENERALIZED): ICD-10-CM

## 2019-03-22 DIAGNOSIS — M53.3 SACROILIAC JOINT DYSFUNCTION OF LEFT SIDE: ICD-10-CM

## 2019-03-26 DIAGNOSIS — M54.42 CHRONIC LEFT-SIDED LOW BACK PAIN WITH LEFT-SIDED SCIATICA: ICD-10-CM

## 2019-03-26 DIAGNOSIS — G89.29 CHRONIC LEFT-SIDED LOW BACK PAIN WITH LEFT-SIDED SCIATICA: ICD-10-CM

## 2019-03-26 RX ORDER — CYCLOBENZAPRINE HCL 10 MG
10 TABLET ORAL 3 TIMES DAILY PRN
Qty: 42 TABLET | Refills: 0 | Status: SHIPPED | OUTPATIENT
Start: 2019-03-26 | End: 2019-04-25

## 2019-04-05 ENCOUNTER — COMMUNICATION - HEALTHEAST (OUTPATIENT)
Dept: PHYSICAL MEDICINE AND REHAB | Facility: CLINIC | Age: 59
End: 2019-04-05

## 2019-04-05 ENCOUNTER — OFFICE VISIT (OUTPATIENT)
Dept: FAMILY MEDICINE | Facility: CLINIC | Age: 59
End: 2019-04-05
Payer: MEDICARE

## 2019-04-05 VITALS
SYSTOLIC BLOOD PRESSURE: 111 MMHG | DIASTOLIC BLOOD PRESSURE: 77 MMHG | BODY MASS INDEX: 34.25 KG/M2 | TEMPERATURE: 98.4 F | WEIGHT: 198 LBS | HEART RATE: 92 BPM | OXYGEN SATURATION: 100 % | RESPIRATION RATE: 20 BRPM

## 2019-04-05 DIAGNOSIS — M79.7 FIBROMYALGIA: ICD-10-CM

## 2019-04-05 DIAGNOSIS — E11.9 TYPE 2 DIABETES MELLITUS WITHOUT COMPLICATION, WITHOUT LONG-TERM CURRENT USE OF INSULIN (H): Primary | ICD-10-CM

## 2019-04-05 DIAGNOSIS — B02.9 HERPES ZOSTER WITHOUT COMPLICATION: ICD-10-CM

## 2019-04-05 DIAGNOSIS — M79.18 MYOFASCIAL PAIN: ICD-10-CM

## 2019-04-05 LAB
BUN SERPL-MCNC: 14 MG/DL (ref 7–30)
CALCIUM SERPL-MCNC: 9.3 MG/DL (ref 8.5–10.4)
CHLORIDE SERPLBLD-SCNC: 105 MMOL/L (ref 94–109)
CHOLEST SERPL-MCNC: 202 MG/DL
CHOLEST/HDLC SERPL: 3.2 RATIO
CO2 SERPL-SCNC: 28 MMOL/L (ref 20–32)
CREAT SERPL-MCNC: 1.2 MG/DL (ref 0.6–1.3)
EGFR CALCULATED (BLACK REFERENCE): 59.8 ML/MIN
EGFR CALCULATED (NON BLACK REFERENCE): 49.4 ML/MIN
GLUCOSE SERPL-MCNC: 93 MG/DL (ref 60–109)
HBA1C MFR BLD: 6.2 % (ref 4.1–5.7)
HDLC SERPL-MCNC: 63 MG/DL
LDLC SERPL CALC-MCNC: 123 MG/DL (ref 0–99)
POTASSIUM SERPL-SCNC: 4.4 MMOL/L (ref 3.4–5.3)
SODIUM SERPL-SCNC: 138 MMOL/L (ref 133–144)
TRIGL SERPL-MCNC: 82 MG/DL
VLDL-CHOLESTEROL: 16 MG/DL (ref 7–32)

## 2019-04-05 RX ORDER — METFORMIN HCL 500 MG
500 TABLET, EXTENDED RELEASE 24 HR ORAL 2 TIMES DAILY WITH MEALS
Qty: 60 TABLET | Refills: 11 | Status: SHIPPED | OUTPATIENT
Start: 2019-04-05 | End: 2020-07-14 | Stop reason: DRUGHIGH

## 2019-04-05 RX ORDER — VALACYCLOVIR HYDROCHLORIDE 1 G/1
1000 TABLET, FILM COATED ORAL 3 TIMES DAILY
Qty: 21 TABLET | Refills: 0 | Status: SHIPPED | OUTPATIENT
Start: 2019-04-05 | End: 2019-04-18

## 2019-04-05 RX ORDER — DULOXETIN HYDROCHLORIDE 20 MG/1
60 CAPSULE, DELAYED RELEASE ORAL DAILY
COMMUNITY
Start: 2019-02-04 | End: 2021-10-28

## 2019-04-05 RX ORDER — IBUPROFEN 800 MG/1
800 TABLET, FILM COATED ORAL EVERY 8 HOURS PRN
Qty: 30 TABLET | Refills: 0 | Status: SHIPPED | OUTPATIENT
Start: 2019-04-05 | End: 2019-04-18

## 2019-04-05 ASSESSMENT — PATIENT HEALTH QUESTIONNAIRE - PHQ9: SUM OF ALL RESPONSES TO PHQ QUESTIONS 1-9: 13

## 2019-04-05 NOTE — PROGRESS NOTES
SUBJECTIVE:  Sushila Flores is a 58 year old who presents today for follow up of DIABETES MELLITUS and rib pain    Diabetes Medication(s)     Biguanides       metFORMIN (GLUCOPHAGE-XR) 500 MG 24 hr tablet    Take 1 tablet (500 mg) by mouth 2 times daily (with meals)     Patient not taking:  Reported on 10/29/2018        Lab Results   Component Value Date    A1C 6.2 04/05/2019    A1C 6.0 05/30/2018    A1C 6.1 10/18/2017    A1C 6.7 03/17/2017    A1C 6.0 02/14/2014       1.  Diabetes:  Patient glucose self monitoring: every 2 weeks.   Blood glucose averages: 100-120  Symptoms of low blood sugar (hypoglycemia:sweating, shaky, weak, dizzy, blurred vision, confusion)? no  Problems taking medications regularly? no  Side effects? no      2. Right shoulder/rib pain  Over the past 4 days has had increasing right shoulder and rib pain  Recently developed a rash on the right side of her ribs  Has been taking ibuprofen which is helpful for the pain, requests refill.      Health maintenance reviewed and appropriate orders placed?  Yes      BP Readings from Last 3 Encounters:   04/05/19 111/77   01/11/19 135/79   10/29/18 117/76       Lab Results   Component Value Date    A1C 6.2 04/05/2019    A1C 6.0 05/30/2018    A1C 6.1 10/18/2017       Recent Labs   Lab Test 05/30/18  1446 03/17/17  1420   CHOL 180.0 193.0   HDL 58.0 65.0   .0* 112.0*   TRIG 79.0 82.0   CHOLHDLRATIO 3.1 3.0       Wt Readings from Last 3 Encounters:   04/05/19 89.8 kg (198 lb)   01/11/19 89.7 kg (197 lb 12.8 oz)   10/29/18 88.9 kg (196 lb)       Current Outpatient Medications   Medication Sig Dispense Refill     Blood Gluc Meter Disp-Strips (BLOOD GLUCOSE METER DISPOSABLE) MARCIANO Dispense per preferred. Test sugars as needed up to 1 per day. Dx250.0 Strips and lancets #90 1 each 0     blood glucose monitoring (SHAGUFTA MICROLET) lancets Use to test blood sugar 1 times daily or as directed. 100 each prn     blood glucose monitoring (NO BRAND SPECIFIED) test  strip Use to test blood sugars 1-2 times daily or as directed 100 strip prn     cyclobenzaprine (FLEXERIL) 10 MG tablet Take 1 tablet (10 mg) by mouth 3 times daily as needed for muscle spasms 42 tablet 0     order for DME Electronic blood pressure cuff - check blood pressure 2-3 times weekly 1 Device 0     polyethylene glycol (MIRALAX) powder Take 17 g (1 capful) by mouth daily 510 g 1     acetaminophen (TYLENOL) 500 MG tablet Take 2 tablets (1,000 mg) by mouth 2 times daily (Patient not taking: Reported on 4/5/2019) 180 tablet 0     alum & mag hydroxide-simethicone (MYLANTA/MAALOX) 200-200-20 MG/5ML SUSP suspension Take 30 mLs by mouth every 4 hours as needed for indigestion (Patient not taking: Reported on 10/29/2018) 148 mL 0     atorvastatin (LIPITOR) 80 MG tablet Take 1 tablet (80 mg) by mouth daily (Patient not taking: Reported on 1/11/2019) 90 tablet 3     metFORMIN (GLUCOPHAGE-XR) 500 MG 24 hr tablet Take 1 tablet (500 mg) by mouth 2 times daily (with meals) (Patient not taking: Reported on 10/29/2018) 60 tablet 11     ranitidine (ZANTAC) 150 MG tablet Take 1 tablet (150 mg) by mouth 2 times daily (Patient not taking: Reported on 10/29/2018) 60 tablet 1     senna-docusate (SENOKOT-S;PERICOLACE) 8.6-50 MG per tablet Take 1 tablet by mouth 2 times daily (Patient not taking: Reported on 10/29/2018) 180 tablet 1       Histories reviewed and updated in Epic.      ROS:  C: NEGATIVE for fatigue, unexpected change in weight  E: NEGATIVE for acute vision problems or changes  R: NEGATIVE for significant cough or shortness of breath  CV: NEGATIVE for chest pain, palpitations or new or worsening peripheral edema  P: NEGATIVE for changes in mood or affect    EXAM:  Vitals: /77   Pulse 92   Temp 98.4  F (36.9  C) (Oral)   Resp 20   Wt 89.8 kg (198 lb)   SpO2 100%   BMI 34.25 kg/m     BMIE= Body mass index is 34.25 kg/m .  GENERAL APPEARANCE: alert and no acute distress  PSYCH: mentation appears normal and  affect normal/bright  RESP: lungs clear to auscultation - no rales, rhonchi or wheezes  CV: regular rate and rhythm, normal S1 S2, no S3 or S4 and no murmur, click or rub -  EXT: no cyanosis or edema in lower extremities  SKIN: vesicular rash in clusters in a dermatomal distribution over right T5-T6    ASSESSMENT AND PLAN:  1. Type 2 diabetes mellitus without complication, without long-term current use of insulin (H)  Well controlled, patient had not been taking metformin, encouraged to do so for mortality benefit.   - Basic Metabolic Panel ([facility]) - Results < 1 hr  - Hemoglobin A1c ([facility])  - Lipid Panel ([facility])  - Microalbumin Creatinine Ratio Random Ur ([facility])  - metFORMIN (GLUCOPHAGE-XR) 500 MG 24 hr tablet; Take 1 tablet (500 mg) by mouth 2 times daily (with meals)  Dispense: 60 tablet; Refill: 11    2. Herpes zoster without complication  Right sided rib pain with vesicular rash. Will start acycolvir, refill ibuprofen, and short supply Tylenol #3.   - valACYclovir (VALTREX) 1000 mg tablet; Take 1 tablet (1,000 mg) by mouth 3 times daily for 7 days  Dispense: 21 tablet; Refill: 0  - ibuprofen (ADVIL/MOTRIN) 800 MG tablet; Take 1 tablet (800 mg) by mouth every 8 hours as needed for moderate pain  Dispense: 30 tablet; Refill: 0    Noemi Coughlin    Precepted with: Kishan Russell MD

## 2019-04-08 NOTE — RESULT ENCOUNTER NOTE
Called Sushila Flores and informed of lab results, pt is needing a refill for the atorvastatin. She is now scheduled for 4/18/19.

## 2019-04-18 ENCOUNTER — OFFICE VISIT (OUTPATIENT)
Dept: FAMILY MEDICINE | Facility: CLINIC | Age: 59
End: 2019-04-18
Payer: MEDICARE

## 2019-04-18 VITALS
SYSTOLIC BLOOD PRESSURE: 145 MMHG | HEART RATE: 105 BPM | DIASTOLIC BLOOD PRESSURE: 97 MMHG | RESPIRATION RATE: 20 BRPM | OXYGEN SATURATION: 95 % | WEIGHT: 198 LBS | BODY MASS INDEX: 34.25 KG/M2 | TEMPERATURE: 98.5 F

## 2019-04-18 DIAGNOSIS — E11.9 TYPE 2 DIABETES MELLITUS WITHOUT COMPLICATION, WITHOUT LONG-TERM CURRENT USE OF INSULIN (H): ICD-10-CM

## 2019-04-18 DIAGNOSIS — B02.29 POST HERPETIC NEURALGIA: Primary | ICD-10-CM

## 2019-04-18 LAB
BUN SERPL-MCNC: 11 MG/DL (ref 7–30)
CALCIUM SERPL-MCNC: 9.9 MG/DL (ref 8.5–10.4)
CHLORIDE SERPLBLD-SCNC: 103 MMOL/L (ref 94–109)
CO2 SERPL-SCNC: 30 MMOL/L (ref 20–32)
CREAT SERPL-MCNC: 0.8 MG/DL (ref 0.6–1.3)
EGFR CALCULATED (BLACK REFERENCE): >90 ML/MIN
EGFR CALCULATED (NON BLACK REFERENCE): 78.9 ML/MIN
GLUCOSE SERPL-MCNC: 95 MG/DL (ref 60–109)
POTASSIUM SERPL-SCNC: 4.4 MMOL/L (ref 3.4–5.3)
SODIUM SERPL-SCNC: 143 MMOL/L (ref 133–144)

## 2019-04-18 RX ORDER — LIDOCAINE 4 G/G
1 PATCH TOPICAL EVERY 24 HOURS
Qty: 12 PATCH | Refills: 1 | Status: SHIPPED | OUTPATIENT
Start: 2019-04-18 | End: 2019-07-25

## 2019-04-18 ASSESSMENT — PATIENT HEALTH QUESTIONNAIRE - PHQ9: SUM OF ALL RESPONSES TO PHQ QUESTIONS 1-9: 13

## 2019-04-18 NOTE — LETTER
April 19, 2019      Sushila Flores  [address]  [place] [address]        Dear Sushila,    Please see below for your test results.    Resulted Orders   Basic Metabolic Panel ([facility]) - Results < 1 hr   Result Value Ref Range    Glucose 95.0 60.0 - 109.0 mg/dL    Urea Nitrogen 11.0 7.0 - 30.0 mg/dL    Creatinine 0.8 0.6 - 1.3 mg/dL    Sodium 143.0 133.0 - 144.0 mmol/L    Potassium 4.4 3.4 - 5.3 mmol/L    Chloride 103.0 94.0 - 109.0 mmol/L    Carbon Dioxide 30.0 20.0 - 32.0 mmol/L    Calcium 9.9 8.5 - 10.4 mg/dL    eGFR Calculated (Non Black Reference) 78.9 >60.0 mL/min    eGFR Calculated (Black Reference) >90 >60.0 mL/min     Please inform patient that kidney function is back to normal     If you have any questions, please call the clinic to make an appointment.    Sincerely,    Noemi Coughlin MD

## 2019-04-18 NOTE — PROGRESS NOTES
Preceptor Attestation:   Patient seen, evaluated and discussed with the resident. I have verified the content of the note, which accurately reflects my assessment of the patient and the plan of care.  Supervising Physician:Herson Akins MD  [facility]

## 2019-04-18 NOTE — PROGRESS NOTES
I have personally reviewed the history and examination as documented by Dr. Coughlin.  I was present during key portions of the visit and agree with the assessment and plan as documented for 58 yr old female with DM here for follow-up, likely shingles. DM well-controlled. Meds given for shingles. Precautions given. Anticipatory guidance given.     Kishan Russell MD  April 18, 2019  3:06 PM

## 2019-04-18 NOTE — PROGRESS NOTES
Chief Complaint   Patient presents with     Lab Result Notice     Refill Request     Shingles     S:  Sushila Flores is a 58 year old year old female who  has a past medical history of Bilateral carpal tunnel syndrome (2015), Chronic back pain (2014), and Osteoarthritis (2015). who presents to discuss:    1. Shingles  -lesions are crusted over now but continues with pain  -tried to use an old lidocaine patch, wasn't   -does not want to use gabapentin, previously bad experience    Complete ROS otherwise negative.     Past Medical History:   Diagnosis Date     Bilateral carpal tunnel syndrome 2015     Chronic back pain 2014     Osteoarthritis 2015     No past surgical history on file.  Family History   Problem Relation Age of Onset     Diabetes Brother      Diabetes Maternal Grandmother      Hypertension Sister      Other Cancer Maternal Grandfather      Breast Cancer Maternal Aunt      Breast Cancer Paternal Aunt      Other Cancer Paternal Grandmother         stomach cancer     Asthma No family hx of      Social History     Socioeconomic History     Marital status: Single     Spouse name: Not on file     Number of children: Not on file     Years of education: Not on file     Highest education level: Not on file   Occupational History     Not on file   Social Needs     Financial resource strain: Not on file     Food insecurity:     Worry: Not on file     Inability: Not on file     Transportation needs:     Medical: Not on file     Non-medical: Not on file   Tobacco Use     Smoking status: Former Smoker     Types: Cigarettes     Last attempt to quit: 2018     Years since quittin.3     Smokeless tobacco: Never Used   Substance and Sexual Activity     Alcohol use: Yes     Alcohol/week: 0.0 oz     Comment: ocassionally     Drug use: Yes     Types: Marijuana     Sexual activity: Yes     Partners: Male     Birth control/protection: None   Lifestyle     Physical activity:     Days  per week: Not on file     Minutes per session: Not on file     Stress: Not on file   Relationships     Social connections:     Talks on phone: Not on file     Gets together: Not on file     Attends Yazdanism service: Not on file     Active member of club or organization: Not on file     Attends meetings of clubs or organizations: Not on file     Relationship status: Not on file     Intimate partner violence:     Fear of current or ex partner: Not on file     Emotionally abused: Not on file     Physically abused: Not on file     Forced sexual activity: Not on file   Other Topics Concern     Parent/sibling w/ CABG, MI or angioplasty before 65F 55M? Not Asked   Social History Narrative    Works at [employer]. 4 children, one set of twins. 3 grandkids.       O:  Vitals: BP (!) 145/97   Pulse 105   Temp 98.5  F (36.9  C) (Oral)   Resp 20   Wt 89.8 kg (198 lb)   SpO2 95%   BMI 34.25 kg/m      General: Alert, well-appearing, no acute distress  HEENT: PERRL, moist oral mucus membranes  Pulm: clear to auscultation bilaterally, no tachypnea  CV: RRR, no murmur  Skin: Crusted lesions right T5 distribution, no erythema no discharge  Psych: Mood appropriate to visit content, full affect, rational thought content and process      A/P:  1. Post herpetic neuralgia  - Lidocaine (LIDOCARE) 4 % Patch; Place 1 patch onto the skin every 24 hours  Dispense: 12 patch; Refill: 1    2. Type 2 diabetes mellitus without complication, without long-term current use of insulin (H)  Cr slightly increased on last check, was using NSAIDs now not, will recheck.  - Basic Metabolic Panel ([facility]) - Results < 1 hr          Noemi Coughlin MD  [facility] Medicine Resident  Pager     Precepted with: Herson Akins MD

## 2019-04-19 ENCOUNTER — HOSPITAL ENCOUNTER (OUTPATIENT)
Dept: PHYSICAL MEDICINE AND REHAB | Facility: CLINIC | Age: 59
Discharge: HOME | End: 2019-04-19
Attending: PAIN MEDICINE

## 2019-04-19 DIAGNOSIS — M79.7 FIBROMYALGIA: ICD-10-CM

## 2019-04-19 DIAGNOSIS — G89.4 CHRONIC PAIN SYNDROME: ICD-10-CM

## 2019-04-19 DIAGNOSIS — M54.2 CERVICALGIA: ICD-10-CM

## 2019-04-19 DIAGNOSIS — M48.062 SPINAL STENOSIS OF LUMBAR REGION WITH NEUROGENIC CLAUDICATION: ICD-10-CM

## 2019-04-19 DIAGNOSIS — M79.18 MYOFASCIAL PAIN: ICD-10-CM

## 2019-04-25 ENCOUNTER — TELEPHONE (OUTPATIENT)
Dept: FAMILY MEDICINE | Facility: CLINIC | Age: 59
End: 2019-04-25

## 2019-04-25 DIAGNOSIS — M54.42 CHRONIC LEFT-SIDED LOW BACK PAIN WITH LEFT-SIDED SCIATICA: ICD-10-CM

## 2019-04-25 DIAGNOSIS — G89.29 CHRONIC LEFT-SIDED LOW BACK PAIN WITH LEFT-SIDED SCIATICA: ICD-10-CM

## 2019-04-25 DIAGNOSIS — B02.29 POSTHERPETIC NEURALGIA: Primary | ICD-10-CM

## 2019-04-25 RX ORDER — CYCLOBENZAPRINE HCL 10 MG
10 TABLET ORAL 3 TIMES DAILY PRN
Qty: 42 TABLET | Refills: 1 | Status: SHIPPED | OUTPATIENT
Start: 2019-04-25 | End: 2019-07-31

## 2019-04-25 RX ORDER — LIDOCAINE 40 MG/G
CREAM TOPICAL
Qty: 120 G | Refills: 1 | Status: SHIPPED | OUTPATIENT
Start: 2019-04-25 | End: 2019-07-25

## 2019-04-25 NOTE — TELEPHONE ENCOUNTER
Message to physician: Patient called and requesting a refill     Date of last visit: 4/18/2019     Date of next visit if scheduled: Visit date not found       Last Comprehensive Metabolic Panel:  Sodium   Date Value Ref Range Status   04/18/2019 143.0 133.0 - 144.0 mmol/L Final     Potassium   Date Value Ref Range Status   04/18/2019 4.4 3.4 - 5.3 mmol/L Final     Chloride   Date Value Ref Range Status   04/18/2019 103.0 94.0 - 109.0 mmol/L Final     Carbon Dioxide   Date Value Ref Range Status   04/18/2019 30.0 20.0 - 32.0 mmol/L Final     Glucose   Date Value Ref Range Status   04/18/2019 95.0 60.0 - 109.0 mg/dL Final     Urea Nitrogen   Date Value Ref Range Status   04/18/2019 11.0 7.0 - 30.0 mg/dL Final     Creatinine   Date Value Ref Range Status   04/18/2019 0.8 0.6 - 1.3 mg/dL Final     GFR Estimate   Date Value Ref Range Status   08/26/2013 > 60 >60 ml/min/1.73m2 Final     Calcium   Date Value Ref Range Status   04/18/2019 9.9 8.5 - 10.4 mg/dL Final       BP Readings from Last 3 Encounters:   04/18/19 (!) 145/97   04/05/19 111/77   01/11/19 135/79       Lab Results   Component Value Date    A1C 6.2 04/05/2019    A1C 6.0 05/30/2018    A1C 6.1 10/18/2017    A1C 6.7 03/17/2017    A1C 6.0 02/14/2014                Please complete refill and CLOSE ENCOUNTER.  Closing the encounter signifies the refill is complete.

## 2019-04-25 NOTE — TELEPHONE ENCOUNTER
[facility] phone call message- patient requesting a refill:    Full Medication Name: MUSCLE RELAXER    Dose:     Pharmacy confirmed as   [id number] - [place], [place] - [address]  [address]  [place] [address]  Phone: [phone] Fax: [phone]  : Yes    Additional Comments: Patient states she needs a medication refill for her muscle relaxer. Patient is unsure of name. Please call and advise.      OK to leave a message on voice mail? Yes    Primary language: English      needed? No    Call taken on April 25, 2019 at 2:23 PM by Zoey Castellon

## 2019-04-25 NOTE — TELEPHONE ENCOUNTER
Called patient back to confirmed the name of the medication need refill for Cyclobenzaprine (Flexeril )10 mg

## 2019-04-25 NOTE — TELEPHONE ENCOUNTER
[facility] phone call message- medication clarification/question:    Full Medication Name: Lidocaine (LIDOCARE)    Dose: 4 % Patch    Question: Patient states when she went to the pharmacy to  her prescription they notified her insurance would not cover the cost and she would have to pay $21.00 out of pocket. Patient states she does not have money and is wondering what would the provider like to do. Please call and advise the patient.      Pharmacy confirmed as [id number] - [place], [place] - [address]: Yes    OK to leave a message on voice mail? Yes    Primary language: English      needed? No    Call taken on April 25, 2019 at 2:01 PM by Zoey Castellon

## 2019-04-29 ENCOUNTER — COMMUNICATION - HEALTHEAST (OUTPATIENT)
Dept: PHYSICAL MEDICINE AND REHAB | Facility: CLINIC | Age: 59
End: 2019-04-29

## 2019-04-29 DIAGNOSIS — M79.7 FIBROMYALGIA: ICD-10-CM

## 2019-04-29 DIAGNOSIS — M79.18 MYOFASCIAL PAIN: ICD-10-CM

## 2019-07-25 ENCOUNTER — OFFICE VISIT (OUTPATIENT)
Dept: FAMILY MEDICINE | Facility: CLINIC | Age: 59
End: 2019-07-25
Payer: MEDICARE

## 2019-07-25 ENCOUNTER — RECORDS - HEALTHEAST (OUTPATIENT)
Dept: ADMINISTRATIVE | Facility: OTHER | Age: 59
End: 2019-07-25

## 2019-07-25 VITALS
OXYGEN SATURATION: 99 % | DIASTOLIC BLOOD PRESSURE: 75 MMHG | TEMPERATURE: 98.1 F | HEIGHT: 63 IN | SYSTOLIC BLOOD PRESSURE: 115 MMHG | WEIGHT: 199 LBS | RESPIRATION RATE: 20 BRPM | BODY MASS INDEX: 35.26 KG/M2 | HEART RATE: 86 BPM

## 2019-07-25 DIAGNOSIS — Z00.00 HEALTHCARE MAINTENANCE: ICD-10-CM

## 2019-07-25 DIAGNOSIS — M67.432 GANGLION CYST OF WRIST, LEFT: Primary | ICD-10-CM

## 2019-07-25 ASSESSMENT — MIFFLIN-ST. JEOR: SCORE: 1461.79

## 2019-07-25 NOTE — PROGRESS NOTES
Assessment and Plan   1. Ganglion cyst of wrist, left  Discussed treatment options and patient elected to have aspirated today.    2. Healthcare maintenance  - MA SCREENING DIGITAL BILAT; Future    Procedure Note:  After written consent was obtained, the L wrist was cleaned with alcohol and injected with 0.5mL of Lidocaine using a 25 gauge needle. The area was then recleaned with alcohol and the cyst apirated with an 18 gauge needle. Approximately 0.2mL of clear, gel-like fluid was removed and discarded. The area was then bandaged, the cyst was visually gone.    Dr. Abbey Sanabria was present for the entire procedure.    Follow up in the fall for DM2 follow up.    Options for treatment and follow-up care were reviewed with the patient and/or guardian. Sushila Flores and/or guardian engaged in the decision making process and verbalized understanding of the options discussed and agreed with the final plan.    Bo George MD  [facility], PGY-3    Precepted patient with Dr. Abbey Sanabria       HPI:   Sushila Flores is a 58 year old female who presents to clinic today for   Chief Complaint   Patient presents with     Wrist Pain     left side      Medication Reconciliation     Needs attention, per pt only taking flexeril and metformin. Like to restart lipitor again. Needs refills on flexeril and Lipitor today     Patient has a cyst on her L wrist. 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 It has been slowly growing over the past few months. She has had this in the past but it resolved on its own those times. Has not tried any specific therapy.    She states that she received a letter regarding mammogram and is wondering if she is due for this.         Review of Systems:     Constitutional, HEENT, cardiovascular, pulmonary, GI, musculoskeletal, neuro, skin, and psych systems are negative, except as otherwise noted.           PMHX:   Active Problems List  Patient Active Problem List   Diagnosis     Sciatic nerve pain     Vitamin D deficiency     Chronic back pain     Bilateral carpal tunnel syndrome     Osteoarthritis of multiple joints     Pap smear for cervical cancer screening     Spondylosis of cervical region without myelopathy or radiculopathy     BMI 36.0-36.9,adult     Candidate for statin therapy due to risk of future cardiovascular event     Type 2 diabetes mellitus without complication (H)     Positive FIT (fecal immunochemical test)     Special screening for malignant neoplasms, colon     Active problem list reviewed and updated.    Current Medications  Current Outpatient Medications   Medication Sig Dispense Refill     atorvastatin (LIPITOR) 80 MG tablet Take 1 tablet (80 mg) by mouth daily 90 tablet 3     Blood Gluc Meter Disp-Strips (BLOOD GLUCOSE METER DISPOSABLE) MARCIANO Dispense per preferred. Test sugars as needed up to 1 per day. Dx250.0 Strips and lancets #90 1 each 0     blood glucose monitoring (XiimoET) lancets Use to test blood sugar 1 times daily or as directed. 100 each prn     blood glucose monitoring (NO BRAND SPECIFIED) test strip Use to test blood sugars 1-2 times daily or as directed 100 strip prn     cyclobenzaprine (FLEXERIL) 10 MG tablet Take 1 tablet (10 mg) by mouth 3 times daily as needed for muscle spasms 42 tablet 1     metFORMIN (GLUCOPHAGE-XR) 500 MG 24 hr tablet Take 1 tablet (500 mg) by mouth 2 times daily (with meals) 60 tablet 11     order for DME Electronic blood pressure cuff - check blood pressure 2-3 times weekly 1 Device 0     DULoxetine (CYMBALTA) 20 MG capsule Take 60 mg by mouth daily       Lidocaine (LIDOCARE) 4 % Patch Place 1 patch onto the skin every 24 hours (Patient not taking: Reported on 7/25/2019) 12 patch 1     lidocaine (LMX4) 4 % external cream Apply topically once as needed for mild pain (Patient not taking: Reported on 7/25/2019) 120 g 1     polyethylene glycol  "(MIRALAX) powder Take 17 g (1 capful) by mouth daily (Patient not taking: Reported on 2019) 510 g 1     Medication list reviewed and updated.    Social History  Social History     Tobacco Use     Smoking status: Former Smoker     Types: Cigarettes     Last attempt to quit: 2018     Years since quittin.6     Smokeless tobacco: Never Used   Substance Use Topics     Alcohol use: Yes     Alcohol/week: 0.0 oz     Comment: ocassionally     Drug use: Yes     Types: Marijuana     History   Drug Use     Types: Marijuana     Family History  Family History   Problem Relation Age of Onset     Diabetes Brother      Diabetes Maternal Grandmother      Hypertension Sister      Other Cancer Maternal Grandfather      Breast Cancer Maternal Aunt      Breast Cancer Paternal Aunt      Other Cancer Paternal Grandmother         stomach cancer     Asthma No family hx of      Allergies  Allergies   Allergen Reactions     Amoxicillin Swelling     Penicillin G Nausea and Vomiting     Percocet [Oxycodone-Acetaminophen]      Vicodin [Hydrocodone-Acetaminophen]           Physical Exam:     Vitals:    19 1128   BP: 115/75   Pulse: 86   Resp: 20   Temp: 98.1  F (36.7  C)   SpO2: 99%   Weight: 90.3 kg (199 lb)   Height: 1.6 m (5' 3\")     Body mass index is 35.25 kg/m .    GENERAL APPEARANCE: alert, appears stated age, no acute distress  HEENT: Eyes grossly normal to inspection, nares normal, and mouth and throat without erythema, ulcers, or lesions  RESP: lungs clear to auscultation - no rales, rhonchi, or wheezes  CV: regular rate and rhythm, no murmur, click, rub, or gallop  ABDOMEN: soft, nontender   MSK: 1x1cm fluid filled cyst L wrist, otherwise extremities normal, no gross deformities noted, no lower extremity edema  SKIN: no suspicious lesions or rashes   NEURO: Normal strength and tone, sensory exam grossly normal, mentation appears intact and speech normal  PSYCH: mood and affect normal/bright         "

## 2019-07-31 ENCOUNTER — MYC REFILL (OUTPATIENT)
Dept: OTHER | Age: 59
End: 2019-07-31

## 2019-07-31 DIAGNOSIS — G89.29 CHRONIC LEFT-SIDED LOW BACK PAIN WITH LEFT-SIDED SCIATICA: ICD-10-CM

## 2019-07-31 DIAGNOSIS — Z91.89 CANDIDATE FOR STATIN THERAPY DUE TO RISK OF FUTURE CARDIOVASCULAR EVENT: ICD-10-CM

## 2019-07-31 DIAGNOSIS — M54.42 CHRONIC LEFT-SIDED LOW BACK PAIN WITH LEFT-SIDED SCIATICA: ICD-10-CM

## 2019-07-31 DIAGNOSIS — E11.9 TYPE 2 DIABETES MELLITUS WITHOUT COMPLICATION, WITHOUT LONG-TERM CURRENT USE OF INSULIN (H): ICD-10-CM

## 2019-07-31 RX ORDER — CYCLOBENZAPRINE HCL 10 MG
10 TABLET ORAL 3 TIMES DAILY PRN
Qty: 42 TABLET | Refills: 1 | Status: SHIPPED | OUTPATIENT
Start: 2019-07-31 | End: 2019-07-31

## 2019-07-31 RX ORDER — CYCLOBENZAPRINE HCL 10 MG
10 TABLET ORAL 3 TIMES DAILY PRN
Qty: 42 TABLET | Refills: 1 | Status: SHIPPED | OUTPATIENT
Start: 2019-07-31 | End: 2019-10-31

## 2019-07-31 RX ORDER — ATORVASTATIN CALCIUM 80 MG/1
80 TABLET, FILM COATED ORAL DAILY
Qty: 90 TABLET | Refills: 3 | Status: SHIPPED | OUTPATIENT
Start: 2019-07-31 | End: 2020-08-06

## 2019-07-31 RX ORDER — ATORVASTATIN CALCIUM 80 MG/1
80 TABLET, FILM COATED ORAL DAILY
Qty: 90 TABLET | Refills: 3 | Status: SHIPPED | OUTPATIENT
Start: 2019-07-31 | End: 2019-07-31

## 2019-07-31 NOTE — PROGRESS NOTES
Preceptor Attestation:   Patient seen, evaluated and discussed with the resident. I was present for and supervised the entire procedure. I have verified the content of the note, which accurately reflects my assessment of the patient and the plan of care.  Supervising Physician:Abbey Sanabria DO [facility]

## 2019-08-19 ENCOUNTER — HOSPITAL ENCOUNTER (OUTPATIENT)
Dept: MAMMOGRAPHY | Facility: CLINIC | Age: 59
Discharge: HOME | End: 2019-08-19
Attending: FAMILY MEDICINE

## 2019-08-19 DIAGNOSIS — Z12.31 VISIT FOR SCREENING MAMMOGRAM: ICD-10-CM

## 2019-08-19 LAB — MAMMOGRAM: NORMAL

## 2019-08-22 DIAGNOSIS — Z00.00 HEALTHCARE MAINTENANCE: ICD-10-CM

## 2019-08-22 NOTE — LETTER
August 26, 2019      Sushila Flores  [address]  [place] [address]        Dear Sushila,    Please see below for your test results.      patient may continue her current plan of care.   Mammogram looks normal.   Repeat in 1 year for screening as usual.     Resulted Orders   MA SCREENING DIGITAL BILAT   Result Value Ref Range    MAMMOGRAM         If you have any questions, please call the clinic to make an appointment.    Sincerely,    CARTER Clemens

## 2019-08-24 NOTE — RESULT ENCOUNTER NOTE
Please send a letter for these results. The patient may continue her current plan of care.  Mammogram looks normal.   Repeat in 1 year for screening as usual.     Moustapha Espinosa MD

## 2019-10-21 ENCOUNTER — TELEPHONE (OUTPATIENT)
Dept: FAMILY MEDICINE | Facility: CLINIC | Age: 59
End: 2019-10-21

## 2019-10-31 ENCOUNTER — COMMUNICATION - HEALTHEAST (OUTPATIENT)
Dept: PHYSICAL MEDICINE AND REHAB | Facility: CLINIC | Age: 59
End: 2019-10-31

## 2019-10-31 ENCOUNTER — OFFICE VISIT (OUTPATIENT)
Dept: FAMILY MEDICINE | Facility: CLINIC | Age: 59
End: 2019-10-31
Payer: MEDICARE

## 2019-10-31 ENCOUNTER — TELEPHONE (OUTPATIENT)
Dept: FAMILY MEDICINE | Facility: CLINIC | Age: 59
End: 2019-10-31

## 2019-10-31 VITALS
OXYGEN SATURATION: 97 % | RESPIRATION RATE: 16 BRPM | WEIGHT: 203 LBS | BODY MASS INDEX: 34.66 KG/M2 | HEIGHT: 64 IN | SYSTOLIC BLOOD PRESSURE: 148 MMHG | HEART RATE: 90 BPM | DIASTOLIC BLOOD PRESSURE: 90 MMHG | TEMPERATURE: 98.4 F

## 2019-10-31 DIAGNOSIS — J30.2 SEASONAL ALLERGIC RHINITIS, UNSPECIFIED TRIGGER: ICD-10-CM

## 2019-10-31 DIAGNOSIS — M79.18 MYOFASCIAL PAIN: ICD-10-CM

## 2019-10-31 DIAGNOSIS — K21.00 GASTROESOPHAGEAL REFLUX DISEASE WITH ESOPHAGITIS: ICD-10-CM

## 2019-10-31 DIAGNOSIS — Z23 NEED FOR PROPHYLACTIC VACCINATION AND INOCULATION AGAINST INFLUENZA: ICD-10-CM

## 2019-10-31 DIAGNOSIS — M79.7 FIBROMYALGIA: ICD-10-CM

## 2019-10-31 DIAGNOSIS — E11.9 TYPE 2 DIABETES MELLITUS WITHOUT COMPLICATION, WITHOUT LONG-TERM CURRENT USE OF INSULIN (H): Primary | ICD-10-CM

## 2019-10-31 LAB
BUN SERPL-MCNC: 9 MG/DL (ref 7–30)
CALCIUM SERPL-MCNC: 9 MG/DL (ref 8.5–10.4)
CHLORIDE SERPLBLD-SCNC: 102 MMOL/L (ref 94–109)
CO2 SERPL-SCNC: 29 MMOL/L (ref 20–32)
CREAT SERPL-MCNC: 0.9 MG/DL (ref 0.6–1.3)
CREAT UR-MCNC: 11.6 MG/DL
EGFR CALCULATED (BLACK REFERENCE): 83.3 ML/MIN
EGFR CALCULATED (NON BLACK REFERENCE): 68.8 ML/MIN
GLUCOSE SERPL-MCNC: 102 MG/DL (ref 60–109)
HBA1C MFR BLD: 6.1 % (ref 4.1–5.7)
MICROALBUMIN UR-MCNC: <0.5 MG/DL (ref 0–1.99)
MICROALBUMIN/CREAT UR: NORMAL MG/G{CREAT}
POTASSIUM SERPL-SCNC: 3.8 MMOL/L (ref 3.4–5.3)
SODIUM SERPL-SCNC: 142 MMOL/L (ref 133–144)

## 2019-10-31 RX ORDER — CETIRIZINE HYDROCHLORIDE 5 MG/1
10 TABLET ORAL DAILY
Qty: 60 TABLET | Refills: 11 | Status: SHIPPED | OUTPATIENT
Start: 2019-10-31 | End: 2019-11-25

## 2019-10-31 RX ORDER — FAMOTIDINE 10 MG
10 TABLET ORAL 2 TIMES DAILY
Qty: 60 TABLET | Refills: 11 | Status: SHIPPED | OUTPATIENT
Start: 2019-10-31 | End: 2021-10-28

## 2019-10-31 ASSESSMENT — MIFFLIN-ST. JEOR: SCORE: 1489.18

## 2019-10-31 NOTE — PROGRESS NOTES
HPI:       Sushila Flores is a 58 year old  female with PMH significant for DM2,  who presents for:      1. DM follow up   - last A1c was 6.2 on 4/5/2019, today is 6.1  - had been snacking on ice cream lately and has gained 4 lbs, now cut out the ice cream and other snacks  - started drinking lemon and water to help with allergies   - gets out and walks to help with chronic back pain   - has not had eye exam in the last year  - due for foot exam   - on metformin  mg BID    2. Back pain   - flexeril and cymbalta not helping  - has appt early next week with spine specialist for injection and medication change    3. Medication refill  - had been using zantac but afraid of new carcinogen info in the news, wants new med to help   - had been on zyrtec in the past for seasonal allergies, needs refill              PMHX:     Patient Active Problem List   Diagnosis     Sciatic nerve pain     Vitamin D deficiency     Chronic back pain     Bilateral carpal tunnel syndrome     Osteoarthritis of multiple joints     Pap smear for cervical cancer screening     Spondylosis of cervical region without myelopathy or radiculopathy     BMI 36.0-36.9,adult     Candidate for statin therapy due to risk of future cardiovascular event     Type 2 diabetes mellitus without complication (H)     Positive FIT (fecal immunochemical test)     Special screening for malignant neoplasms, colon       Current Outpatient Medications   Medication Sig Dispense Refill     atorvastatin (LIPITOR) 80 MG tablet Take 1 tablet (80 mg) by mouth daily 90 tablet 3     Blood Gluc Meter Disp-Strips (BLOOD GLUCOSE METER DISPOSABLE) MARCIANO Dispense per preferred. Test sugars as needed up to 1 per day. Dx250.0 Strips and lancets #90 1 each 0     blood glucose monitoring (SHAGUFTA MICROLET) lancets Use to test blood sugar 1 times daily or as directed. 100 each prn     blood glucose monitoring (NO BRAND SPECIFIED) test strip Use to test blood sugars 1-2 times daily  or as directed 100 strip prn     cyclobenzaprine (FLEXERIL) 10 MG tablet Take 1 tablet (10 mg) by mouth 3 times daily as needed for muscle spasms 42 tablet 1     DULoxetine (CYMBALTA) 20 MG capsule Take 60 mg by mouth daily       metFORMIN (GLUCOPHAGE-XR) 500 MG 24 hr tablet Take 1 tablet (500 mg) by mouth 2 times daily (with meals) 60 tablet 11     order for DME Electronic blood pressure cuff - check blood pressure 2-3 times weekly 1 Device 0          Allergies   Allergen Reactions     Amoxicillin Swelling     Penicillin G Nausea and Vomiting     Percocet [Oxycodone-Acetaminophen]      Vicodin [Hydrocodone-Acetaminophen]        No results found for this or any previous visit (from the past 24 hour(s)).         Review of Systems:   10 point ROS negative except noted in above in HPI         Physical Exam:     There were no vitals filed for this visit.  There is no height or weight on file to calculate BMI.    GENERAL APPEARANCE: healthy, alert and no distress,  RESP: lungs clear to auscultation - no rales, rhonchi or wheezes  CV: regular rate and rhythm,  and no murmur, click,  rub or gallop  Feet: normal monofilament test       Assessment and Plan     (E11.9) Type 2 diabetes mellitus without complication, without long-term current use of insulin (H)  (primary encounter diagnosis)  Comment: well control, A1c 6.1  Plan: Hemoglobin A1c (P ), Basic Metabolic Panel         ([facility]) - Results < 1 hr, Microalbumin         Creatinine Ratio Random Ur ([facility])  - continue metformin  mg BID   - educated on healthy lifestyle habits   - follow up in 6 months due to good control   - foot exam completed today  - eye exam referral placed  - A1c, BMP, and microalbumin today. Due for lipids in April 2020     (J30.2) Seasonal allergic rhinitis, unspecified trigger  Comment:   Plan: cetirizine (ZYRTEC) 5 MG tablet        refilled    (K21.0) Gastroesophageal reflux disease with esophagitis  Comment:  Advised to stop zantac  and start pepcid   Plan: famotidine (PEPCID) 10 MG tablet   pepcid 20 mg daily     (Z23) Need for prophylactic vaccination and inoculation against influenza  Comment:   Plan: Fluzone quad, preserve-free/prefilled  0.5ml,         6+ months [92166]            Options for treatment and follow-up care were reviewed with the patient and/or guardian. Sushila Flores and/or guardian engaged in the decision making process and verbalized understanding of the options discussed and agreed with the final plan.      Moustapha Espinosa MD   [facility] Resident   Pager: [phone]      Precepted today with: Ruth Hunt MD

## 2019-10-31 NOTE — LETTER
"November 6, 2019      Sushila Flores  [address]  [place] [address]        Dear Sushila,    Your microalbumin/creatinine ratio was normal. This means your good control of diabetes has been working to keep your kidneys from getting damaged.     Please see below for your test results.    Resulted Orders   Hemoglobin A1c ([facility])   Result Value Ref Range    Hemoglobin A1C 6.1 (H) 4.1 - 5.7 %   Basic Metabolic Panel ([facility]) - Results < 1 hr   Result Value Ref Range    Glucose 102.0 60.0 - 109.0 mg/dL    Urea Nitrogen 9.0 7.0 - 30.0 mg/dL    Creatinine 0.9 0.6 - 1.3 mg/dL    Sodium 142.0 133.0 - 144.0 mmol/L    Potassium 3.8 3.4 - 5.3 mmol/L    Chloride 102.0 94.0 - 109.0 mmol/L    Carbon Dioxide 29.0 20.0 - 32.0 mmol/L    Calcium 9.0 8.5 - 10.4 mg/dL    eGFR Calculated (Non Black Reference) 68.8 >60.0 mL/min    eGFR Calculated (Black Reference) 83.3 >60.0 mL/min   Microalbumin Creatinine Ratio Random Ur ([facility])   Result Value Ref Range    Microalbumin, Urine <0.50 0.00 - 1.99 mg/dL    Creatinine, Urine 11.6 mg/dL    Albumin Urine mg/g Cr See Note.       Comment:      \"Unable to calculate: Creatinine and/or Microalbumin value below detectable   level\"      Narrative    Test performed by:  [facility]  [address] [place] [address]  Microalbumin, Random Urine  <2.0 mg/dL . . . . . . . . Normal  3.0-30.0 mg/dL . . . . . . Microalbuminuria  >30.0 mg/dL . . . . . .  . Clinical Proteinuria  Microalbumin/Creatinine Ratio, Random Urine  <20 mg/g . . . . .. . . . Normal   mg/g . . . . . . . Microalbuminuria  >300 mg/g . . . . . . . . Clinical Proteinuria       If you have any questions, please call the clinic to make an appointment.    Sincerely,    Moustapha Espinosa MD  "

## 2019-10-31 NOTE — PATIENT INSTRUCTIONS
Referral for :     Optometry   LOCATION/PLACE/Provider :    [facility] [address]   DATE & TIME :    Location will call patient, called to confirm with patient.   PHONE :     [phone]  FAX :    [phone]  Appointment made by clinic staff/:    Scarlett

## 2019-10-31 NOTE — TELEPHONE ENCOUNTER
[facility] phone call message- medication clarification/question:    Full Medication Name: Cetirizine   Strength: 5 mg    Have you contacted your pharmacy about this refill request?     If  Yes,  which pharmacy?    When did you contact the pharmacy?    Additional comments/concerns from call to pharmacy:    Reason for call to clinic: Calling to see if the doctor can change Rx to 10 mg as insurance doesn't cover and it is $150. Please call and advise.       Pharmacy confirmed as [id number] - [place], [place] - [address]: Yes    OK to leave a message on voice mail?     Primary language: English      needed? No    Call taken on October 31, 2019 at 1:22 PM by Michael Shrestha

## 2019-11-04 ENCOUNTER — HOSPITAL ENCOUNTER (OUTPATIENT)
Dept: PHYSICAL MEDICINE AND REHAB | Facility: CLINIC | Age: 59
Discharge: HOME | End: 2019-11-04
Attending: PAIN MEDICINE

## 2019-11-04 DIAGNOSIS — M79.7 FIBROMYALGIA: ICD-10-CM

## 2019-11-04 DIAGNOSIS — M54.2 CERVICALGIA: ICD-10-CM

## 2019-11-04 DIAGNOSIS — M48.062 SPINAL STENOSIS OF LUMBAR REGION WITH NEUROGENIC CLAUDICATION: ICD-10-CM

## 2019-11-04 DIAGNOSIS — M79.18 MYOFASCIAL PAIN: ICD-10-CM

## 2019-11-04 DIAGNOSIS — G89.4 CHRONIC PAIN SYNDROME: ICD-10-CM

## 2019-11-05 DIAGNOSIS — J30.2 SEASONAL ALLERGIC RHINITIS, UNSPECIFIED TRIGGER: ICD-10-CM

## 2019-11-05 RX ORDER — CETIRIZINE HYDROCHLORIDE 10 MG/1
10 TABLET ORAL DAILY
Qty: 60 TABLET | Refills: 3 | Status: SHIPPED | OUTPATIENT
Start: 2019-11-05 | End: 2021-10-28

## 2019-11-06 NOTE — RESULT ENCOUNTER NOTE
Please send a letter for these results. The patient may continue her current plan of care.  Your microalbumin/creatinine ratio was normal. This means your good control of diabetes has been working to keep your kidneys from getting damaged.     Moustapha Espinosa MD

## 2019-11-25 ENCOUNTER — OFFICE VISIT (OUTPATIENT)
Dept: FAMILY MEDICINE | Facility: CLINIC | Age: 59
End: 2019-11-25
Payer: MEDICARE

## 2019-11-25 VITALS
HEART RATE: 93 BPM | DIASTOLIC BLOOD PRESSURE: 85 MMHG | RESPIRATION RATE: 20 BRPM | SYSTOLIC BLOOD PRESSURE: 135 MMHG | BODY MASS INDEX: 35.3 KG/M2 | OXYGEN SATURATION: 100 % | WEIGHT: 203 LBS

## 2019-11-25 DIAGNOSIS — I10 BENIGN ESSENTIAL HYPERTENSION: Primary | ICD-10-CM

## 2019-11-25 RX ORDER — LISINOPRIL 10 MG/1
5 TABLET ORAL DAILY
Qty: 30 TABLET | Refills: 3 | Status: SHIPPED | OUTPATIENT
Start: 2019-11-25 | End: 2021-10-28

## 2019-11-25 NOTE — PATIENT INSTRUCTIONS
Starting a walking program    Can you give me 5 minutes?  Start with a 5 minute walk and add a minute every day or 2 until you reach 30-60 minutes daily.  This will give you great health benefits and 60 minutes a day along with sensible eating has been shown to help people lose weight.  If you get too sore from you walking skip a day and subtract a minute.  When you are no longer sore, you can begin adding minutes again.  Shoot for every day so you can get in 5-6 days a week, also shown to help reduce weight.    15 tips to get you walking more  By Megan Brunner    Megan recently achieved her goal of walking 10,000 steps everyday in 2015. In an effort to inspire others to get active, Megan compiled 15 tips to help you walk more. So, lace up your sneakers, start your playlists and get walking!    1. Keep walking shoes at work  2. Identify a daily walking destination by your home, school, or work and commit to going there every day  3. Go on a walk date instead   4. Go on a photo walk  5. Take public transportation - walking to the bus stop or train station will add up  6. Walk to a bathroom further away from your office  7. Find a walk lorrie and meet for weekly walks  8. Take a walk tour led by preservation or historical societies  9. Create a walking star chart and give yourself a sticker every day you meet your walking goal  10. Walking meetings at work  11. Listen to your favorite podcasts while walking  12. Create a playlist that is the length of time you want to walk  13. Establish a 1, 3, and 5 mile walk route by your house   14. After dinner walk with your friends or family (a great tradition)  15. Go on a sunrise or sunset walk, take a photo and share on social media

## 2019-11-25 NOTE — PROGRESS NOTES
I have personally reviewed the history and examination as documented by Dr. Arnold Espinosa.  I was present during key portions of the visit and agree with the assessment and plan as documented for 58 yr old female with DM, morbid obesity, HTN here for follow-up. Will start ACE-I. Precautions given. Anticipatory guidance given.     Kishan Russell MD  November 25, 2019  9:47 AM

## 2019-11-25 NOTE — PROGRESS NOTES
HPI:       Sushila Flores is a 58 year old  female with PMH significant for DM2, chronic back pain  who presents for:      1. BP check  - last BP 10/31 was 148/90  - today was 135/85  - was eating chips, processed foods, planning to cut out processed foods, see below  - last time was in a lot of back pain, sees her pain clinic    - no tobacco or alcohol use       2. Wants to lose weight  - stop eating junk food  - going to go to PT for back pain   - healthy snacks, more veggies, cutting out dairy and ice cream   - more veggies   - just joined a gym, next week is meeting with a  to learn about the equipment  - eats food to cope with back pain            PMHX:     Patient Active Problem List   Diagnosis     Sciatic nerve pain     Vitamin D deficiency     Chronic back pain     Bilateral carpal tunnel syndrome     Osteoarthritis of multiple joints     Pap smear for cervical cancer screening     Spondylosis of cervical region without myelopathy or radiculopathy     BMI 36.0-36.9,adult     Candidate for statin therapy due to risk of future cardiovascular event     Type 2 diabetes mellitus without complication (H)     Positive FIT (fecal immunochemical test)     Special screening for malignant neoplasms, colon       Current Outpatient Medications   Medication Sig Dispense Refill     atorvastatin (LIPITOR) 80 MG tablet Take 1 tablet (80 mg) by mouth daily 90 tablet 3     Blood Gluc Meter Disp-Strips (BLOOD GLUCOSE METER DISPOSABLE) MARCIANO Dispense per preferred. Test sugars as needed up to 1 per day. Dx250.0 Strips and lancets #90 1 each 0     blood glucose monitoring (SHAGUFTA MICROLET) lancets Use to test blood sugar 1 times daily or as directed. 100 each prn     blood glucose monitoring (NO BRAND SPECIFIED) test strip Use to test blood sugars 1-2 times daily or as directed 100 strip prn     cetirizine (ZYRTEC) 10 MG tablet Take 1 tablet (10 mg) by mouth daily 60 tablet 3     cetirizine (ZYRTEC) 5 MG tablet Take 2  tablets (10 mg) by mouth daily 60 tablet 11     DULoxetine (CYMBALTA) 20 MG capsule Take 60 mg by mouth daily       famotidine (PEPCID) 10 MG tablet Take 1 tablet (10 mg) by mouth 2 times daily 60 tablet 11     metFORMIN (GLUCOPHAGE-XR) 500 MG 24 hr tablet Take 1 tablet (500 mg) by mouth 2 times daily (with meals) 60 tablet 11     order for DME Electronic blood pressure cuff - check blood pressure 2-3 times weekly 1 Device 0          Allergies   Allergen Reactions     Amoxicillin Swelling     Penicillin G Nausea and Vomiting     Percocet [Oxycodone-Acetaminophen]      Vicodin [Hydrocodone-Acetaminophen]        No results found for this or any previous visit (from the past 24 hour(s)).         Review of Systems:   10 point ROS negative except noted in above in HPI         Physical Exam:     Vitals:    11/25/19 0932   BP: 135/85   Pulse: 93   Resp: 20   SpO2: 100%   Weight: 92.1 kg (203 lb)     Body mass index is 35.3 kg/m .    GENERAL APPEARANCE: healthy, alert and no distress,  RESP: lungs clear to auscultation - no rales, rhonchi or wheezes  CV: regular rate and rhythm,  and no murmur, click,  rub or gallop  PSYCH: mood and affect normal/bright      Assessment and Plan   (I10) Benign essential hypertension  (primary encounter diagnosis)  Comment: /85. Given her diabetes and previous elevated BP, discussed with patient the benefit and risk of starting lisinopril, she would like to start. Last BMP 10/31 normal  Plan: lisinopril (PRINIVIL/ZESTRIL) 5 MG tablet  - start lisinopril 5 mg daily and follow up for BP recheck in 1-2 months         (Z68.36) BMI 36.0-36.9,adult  (primary encounter diagnosis)  Comment: BP normal in clinic today. Patient overweight and has plan for weight loss  Plan:   - discussed healthy foot habits  - discussed staying active--she is starting PT and will be starting at a gym   - discussed cutting salt and processed foods  - follow up in 2 months for weight check/lifetsyle change check   -  given a walking plan in AVS            Options for treatment and follow-up care were reviewed with the patient and/or guardian. Sushila Flores and/or guardian engaged in the decision making process and verbalized understanding of the options discussed and agreed with the final plan.      Moustapha Espinosa MD   [facility] Resident   Pager: [phone]      Precepted today with: Kishan Russell MD

## 2019-12-03 NOTE — TELEPHONE ENCOUNTER
Left detailed message informing Henderson, lidocaine topical cream has been faxed in as replacement to patches. Manjit LEWIS   details… detailed exam mouth

## 2020-01-14 ENCOUNTER — TELEPHONE (OUTPATIENT)
Dept: FAMILY MEDICINE | Facility: CLINIC | Age: 60
End: 2020-01-14

## 2020-03-02 ENCOUNTER — HEALTH MAINTENANCE LETTER (OUTPATIENT)
Age: 60
End: 2020-03-02

## 2020-07-14 ENCOUNTER — VIRTUAL VISIT (OUTPATIENT)
Dept: FAMILY MEDICINE | Facility: CLINIC | Age: 60
End: 2020-07-14
Payer: MEDICARE

## 2020-07-14 DIAGNOSIS — Z20.822 EXPOSURE TO COVID-19 VIRUS: Primary | ICD-10-CM

## 2020-07-14 DIAGNOSIS — E11.9 TYPE 2 DIABETES MELLITUS WITHOUT COMPLICATION, WITHOUT LONG-TERM CURRENT USE OF INSULIN (H): ICD-10-CM

## 2020-07-14 NOTE — PATIENT INSTRUCTIONS
You have possible been exposed to COVID 19.  If you develop any of the symptoms below, you should be tested.  I have ordered you other lab testing.  Will get results to you.    COVID19 Checklist    Symptoms that may appear 2-14 days after exposure to the virus:  Cough   Shortness of breath or difficulty breathing     Or at least two of the following:  Fever   Chills   Repeated shaking with chills   Muscle pain or aches   Headache   Sore throat   New loss of taste or smell   Diarrhea    If you develop any of these emergency warning signs for COVID-19, get emergency medical attention immediately:  Trouble breathing  Persistent pain or pressure in the chest  New confusion or inability to arouse  Bluish lips or face

## 2020-07-14 NOTE — PROGRESS NOTES
"Family Medicine Telephone Visit Note           Telephone Visit Consent   Patient was verbally read the following and verbal consent was obtained.    \"Telephone visits are billed at different rates depending on your insurance coverage. During this emergency period, for some insurers they may be billed the same as an in-person visit.  Please reach out to your insurance provider with any questions.  If during the course of the call the physician/provider feels a telephone visit is not appropriate, you will not be charged for this service.\"    Name person giving consent:  Patient   Date verbal consent given:  7/14/2020  Time verbal consent given:  9:15 AM       Chief Complaint   Patient presents with     Covid 19 Testing     Diabetes     Labs             HPI   Patients name: Sushila  Appointment start time:  9:57 AM    SUBJECTIVE:  A 59-year-old woman being evaluated and managed by telephone due to COVID-19 pandemic.  We had some difficulty connecting by phone, but connected eventually.      She has a best friend whose daughter was asymptomatic but tested positive for COVID-19.  Her friend tested negative.  She is concerned about COVID-19 and wonders if she should be tested.  She has had no symptoms.  She feels fine.  She has had no cough or fever or shortness of breath.  We reviewed the symptom log and she did not have any symptoms at this time.      She has prediabetes.  She was on metformin, but is no longer due to some intolerance.  She is due for testing for her lipids, her kidneys and her A1c.      She is otherwise feeling well.      ASSESSMENT AND PLAN:  We reviewed the COVID-19 symptoms and reasons to be checked.      We will check A1c, BMP and lipid profile.  Results to patient when available.      She is to check in with us if she develops any symptoms, and we will get COVID testing done.      The patient involved in medical decision making throughout the visit.           Current Outpatient Medications " "  Medication Sig Dispense Refill     atorvastatin (LIPITOR) 80 MG tablet Take 1 tablet (80 mg) by mouth daily 90 tablet 3     Blood Gluc Meter Disp-Strips (BLOOD GLUCOSE METER DISPOSABLE) MARCIANO Dispense per preferred. Test sugars as needed up to 1 per day. Dx250.0 Strips and lancets #90 1 each 0     blood glucose monitoring (SHAGUFTA MICROLET) lancets Use to test blood sugar 1 times daily or as directed. 100 each prn     blood glucose monitoring (NO BRAND SPECIFIED) test strip Use to test blood sugars 1-2 times daily or as directed 100 strip prn     cetirizine (ZYRTEC) 10 MG tablet Take 1 tablet (10 mg) by mouth daily 60 tablet 3     DULoxetine (CYMBALTA) 20 MG capsule Take 60 mg by mouth daily       famotidine (PEPCID) 10 MG tablet Take 1 tablet (10 mg) by mouth 2 times daily 60 tablet 11     lisinopril (PRINIVIL/ZESTRIL) 10 MG tablet Take 0.5 tablets (5 mg) by mouth daily 30 tablet 3     metFORMIN (GLUCOPHAGE-XR) 500 MG 24 hr tablet Take 1 tablet (500 mg) by mouth 2 times daily (with meals) 60 tablet 11     order for DME Electronic blood pressure cuff - check blood pressure 2-3 times weekly 1 Device 0     Allergies   Allergen Reactions     Amoxicillin Swelling     Penicillin G Nausea and Vomiting     Percocet [Oxycodone-Acetaminophen]      Vicodin [Hydrocodone-Acetaminophen]               Review of Systems:     Constitutional, HEENT, cardiovascular, pulmonary, gi and gu systems are negative, except as otherwise noted.         Physical Exam:     There were no vitals taken for this visit.  Estimated body mass index is 35.3 kg/m  as calculated from the following:    Height as of 10/31/19: 1.615 m (5' 3.58\").    Weight as of 11/25/19: 92.1 kg (203 lb).    Exam:  Constitutional: healthy, alert and no distress  Psychiatric: mentation appears normal and affect normal/bright          Assessment and Plan   No diagnosis found.    Refilled medications that would be required in the next 3 months.     After Visit " Information:  Will print and mail AVS     No follow-ups on file.    Appointment end time: 10:03 AM  This is a telephone visit that took 6 minutes.      Clinician location:  [facility]     Herson Akins MD

## 2020-08-06 ENCOUNTER — OFFICE VISIT (OUTPATIENT)
Dept: FAMILY MEDICINE | Facility: CLINIC | Age: 60
End: 2020-08-06
Payer: MEDICARE

## 2020-08-06 VITALS
HEART RATE: 93 BPM | HEIGHT: 65 IN | RESPIRATION RATE: 16 BRPM | BODY MASS INDEX: 33.59 KG/M2 | SYSTOLIC BLOOD PRESSURE: 131 MMHG | TEMPERATURE: 98.3 F | WEIGHT: 201.6 LBS | OXYGEN SATURATION: 100 % | DIASTOLIC BLOOD PRESSURE: 88 MMHG

## 2020-08-06 DIAGNOSIS — E11.9 TYPE 2 DIABETES MELLITUS WITHOUT COMPLICATION, WITHOUT LONG-TERM CURRENT USE OF INSULIN (H): ICD-10-CM

## 2020-08-06 DIAGNOSIS — G89.29 CHRONIC LEFT-SIDED LOW BACK PAIN WITH LEFT-SIDED SCIATICA: ICD-10-CM

## 2020-08-06 DIAGNOSIS — T78.40XA ALLERGIC REACTION, INITIAL ENCOUNTER: Primary | ICD-10-CM

## 2020-08-06 DIAGNOSIS — Z91.89 CANDIDATE FOR STATIN THERAPY DUE TO RISK OF FUTURE CARDIOVASCULAR EVENT: ICD-10-CM

## 2020-08-06 DIAGNOSIS — L30.9 DERMATITIS: ICD-10-CM

## 2020-08-06 DIAGNOSIS — M54.42 CHRONIC LEFT-SIDED LOW BACK PAIN WITH LEFT-SIDED SCIATICA: ICD-10-CM

## 2020-08-06 LAB
BUN SERPL-MCNC: 10 MG/DL (ref 7–30)
CALCIUM SERPL-MCNC: 9.8 MG/DL (ref 8.5–10.4)
CHLORIDE SERPLBLD-SCNC: 101 MMOL/L (ref 94–109)
CHOLEST SERPL-MCNC: 214 MG/DL
CHOLEST/HDLC SERPL: 3.1 RATIO
CO2 SERPL-SCNC: 27 MMOL/L (ref 20–32)
CREAT SERPL-MCNC: 0.8 MG/DL (ref 0.6–1.3)
EGFR CALCULATED (BLACK REFERENCE): >90 ML/MIN
EGFR CALCULATED (NON BLACK REFERENCE): 78.6 ML/MIN
GLUCOSE SERPL-MCNC: 117 MG/DL (ref 60–109)
HBA1C MFR BLD: 6.5 % (ref 4.1–5.7)
HDLC SERPL-MCNC: 70 MG/DL
LDLC SERPL CALC-MCNC: 130 MG/DL (ref 0–99)
POTASSIUM SERPL-SCNC: 3.6 MMOL/L (ref 3.4–5.3)
SODIUM SERPL-SCNC: 144 MMOL/L (ref 133–144)
TRIGL SERPL-MCNC: 69 MG/DL
VLDL-CHOLESTEROL: 14 MG/DL (ref 7–32)

## 2020-08-06 RX ORDER — CYCLOBENZAPRINE HCL 10 MG
10 TABLET ORAL 3 TIMES DAILY PRN
Qty: 42 TABLET | Refills: 1 | Status: SHIPPED | OUTPATIENT
Start: 2020-08-06 | End: 2021-05-20

## 2020-08-06 RX ORDER — DIPHENHYDRAMINE HCL 25 MG
25 TABLET ORAL EVERY 6 HOURS PRN
Qty: 30 TABLET | Refills: 1 | Status: SHIPPED | OUTPATIENT
Start: 2020-08-06 | End: 2021-10-28

## 2020-08-06 RX ORDER — TRIAMCINOLONE ACETONIDE 1 MG/G
CREAM TOPICAL 2 TIMES DAILY
Qty: 45 G | Refills: 3 | Status: SHIPPED | OUTPATIENT
Start: 2020-08-06 | End: 2021-10-28

## 2020-08-06 ASSESSMENT — MIFFLIN-ST. JEOR: SCORE: 1493.45

## 2020-08-06 NOTE — PROGRESS NOTES
Assessment and Plan     Allergic Contact Dermatitis:  3 days now after using hair dye. Itchy, mild skin papules. No pus, erythema, or edema on exam. Afebrile.  - Benadryl  - Kenalog 1% cream (advised pt to not use more than 5 days and to wash hands after applying)      Options for treatment and follow-up care were reviewed with the patient and/or guardian. Sushila Flores and/or guardian engaged in the decision making process and verbalized understanding of the options discussed and agreed with the final plan.    Neftaly Leos DO, NASRIN  [facility], PGY-2    Precepted patient with Dr. Olsen    Preceptor Attestation:   Patient seen, evaluated and discussed with the resident. I have verified the content of the note, which accurately reflects my assessment of the patient and the plan of care.  Supervising Physician:Wally Olsen MD  [facility]         HPI:   Sushila Flores is a 59 year old female who presents to clinic today for   Chief Complaint   Patient presents with     Allergic Reaction     hair dye, used three days ago, caused scalp to break out, itching, burning, swelling, causing puss     Medication Reconciliation     needs attention, needs refills on muscle relaxers     Allergic Reaction:  - Scalp  - Used hair dye 3 days ago that has caused this in the past, and mixed up the bottles.  - pain, itchy, breaking out, some pus, burning, swelling  - Tried benadryl cream and hydrogenperoxide, neither of which have helped much           Review of Systems:     A complete 12 point ROS was negative unless otherwise noted in HPI.          PMHX:   Active Problems List  Patient Active Problem List   Diagnosis     Sciatic nerve pain     Vitamin D deficiency     Chronic back pain     Bilateral carpal tunnel syndrome     Osteoarthritis of multiple joints     Pap smear for cervical cancer screening     Spondylosis of cervical region without  myelopathy or radiculopathy     BMI 36.0-36.9,adult     Candidate for statin therapy due to risk of future cardiovascular event     Type 2 diabetes mellitus without complication (H)     Positive FIT (fecal immunochemical test)     Special screening for malignant neoplasms, colon       Current Medications  Current Outpatient Medications   Medication Sig Dispense Refill     Blood Gluc Meter Disp-Strips (BLOOD GLUCOSE METER DISPOSABLE) MARCIANO Dispense per preferred. Test sugars as needed up to 1 per day. Dx250.0 Strips and lancets #90 1 each 0     blood glucose monitoring (SHAGUFTA MICROLET) lancets Use to test blood sugar 1 times daily or as directed. 100 each prn     blood glucose monitoring (NO BRAND SPECIFIED) test strip Use to test blood sugars 1-2 times daily or as directed 100 strip prn     cetirizine (ZYRTEC) 10 MG tablet Take 1 tablet (10 mg) by mouth daily 60 tablet 3     famotidine (PEPCID) 10 MG tablet Take 1 tablet (10 mg) by mouth 2 times daily 60 tablet 11     order for DME Electronic blood pressure cuff - check blood pressure 2-3 times weekly 1 Device 0     atorvastatin (LIPITOR) 80 MG tablet Take 1 tablet (80 mg) by mouth daily (Patient not taking: Reported on 2020) 90 tablet 3     DULoxetine (CYMBALTA) 20 MG capsule Take 60 mg by mouth daily       lisinopril (PRINIVIL/ZESTRIL) 10 MG tablet Take 0.5 tablets (5 mg) by mouth daily (Patient not taking: Reported on 2020) 30 tablet 3       Social History  Social History     Tobacco Use     Smoking status: Former Smoker     Types: Cigarettes     Last attempt to quit: 2018     Years since quittin.6     Smokeless tobacco: Never Used   Substance Use Topics     Alcohol use: Yes     Alcohol/week: 0.0 standard drinks     Comment: ocassionally     Drug use: Yes     Types: Marijuana     History   Drug Use     Types: Marijuana       Family History  Family History   Problem Relation Age of Onset     Diabetes Brother      Diabetes Maternal Grandmother       "Hypertension Sister      Other Cancer Maternal Grandfather      Breast Cancer Maternal Aunt      Breast Cancer Paternal Aunt      Other Cancer Paternal Grandmother         stomach cancer     Asthma No family hx of        Allergies  Allergies   Allergen Reactions     Amoxicillin Swelling     Penicillin G Nausea and Vomiting     Percocet [Oxycodone-Acetaminophen]      Vicodin [Hydrocodone-Acetaminophen]             Physical Exam:     Vitals:    08/06/20 1122   Weight: 91.4 kg (201 lb 9.6 oz)   Height: 1.64 m (5' 4.57\")     Body mass index is 34 kg/m .    GENERAL APPEARANCE: alert, appears stated age, no acute distress  HEENT: Eyes grossly normal to inspection, nares normal, and mouth and throat without erythema, ulcers, or lesions  SKIN: Papular rash on scalp, mild.  PSYCH: mood and affect normal/bright         "

## 2020-08-06 NOTE — RESULT ENCOUNTER NOTE
Sushila,  Thank you for coming to the clinic.  Your lab results are listed below.  Your diabetes marker (A1c) is within goal, so please continue your current medications and activities.  You do have an elevated cholesterol, but you are on a strong dose med, atorvastatin. Please continue.  Your kidneys look good.  Please continue your current medications as the best protection for your kidneys, eyes, heart, and brain are good control of your diabetes and blood pressure.

## 2020-08-10 RX ORDER — ATORVASTATIN CALCIUM 80 MG/1
80 TABLET, FILM COATED ORAL DAILY
Qty: 90 TABLET | Refills: 1 | Status: SHIPPED | OUTPATIENT
Start: 2020-08-10 | End: 2021-10-28

## 2020-11-12 ENCOUNTER — TELEPHONE (OUTPATIENT)
Dept: FAMILY MEDICINE | Facility: CLINIC | Age: 60
End: 2020-11-12

## 2020-11-12 DIAGNOSIS — Z20.822 EXPOSURE TO 2019 NOVEL CORONAVIRUS: Primary | ICD-10-CM

## 2020-11-12 NOTE — TELEPHONE ENCOUNTER
[facility] phone call message- general phone call:    Reason for call: Patient advised being exposed to Covid19+ [employer] on 11/02/2020 and was just contacted today that  is Covid19+. Patient informed no covid19 test avail until Monday PM and was ok to wait until then. Asymptomatic.    Return call needed: Yes    OK to leave a message on voice mail? Yes    Primary language: English      needed? No    Call taken on November 12, 2020 at 11:25 AM by Alen Moon

## 2020-11-12 NOTE — TELEPHONE ENCOUNTER
Patient exposure on 11/02, Monday would put patient at 14 days from exposure but concern for patient due to being high risk. Scheduling patient to be seen tomorrow. Order placed in orders only encounter per standing orders. Jarocho LEWIS

## 2020-11-12 NOTE — TELEPHONE ENCOUNTER
Patient state she would like to speak to a nurse regarding her transportation issue. Patient advise that she will get drop off but [employer] wont come back to pick her up until 3:50 although she has informed them it is just a quick swab. Patient is asking a nurse to call back to discuss further regarding her appointment/ride.

## 2020-11-12 NOTE — TELEPHONE ENCOUNTER
Patient to arrive for appointment and can wait in a room for her ride after the swabbing is completed. Left detailed VM for patient requesting she still come to the appointment and can wait in a room for her ride. Requested call-back with questions. Jarocho LEWIS

## 2020-11-13 DIAGNOSIS — Z20.822 EXPOSURE TO 2019 NOVEL CORONAVIRUS: ICD-10-CM

## 2020-11-13 LAB
COVID-19 VIRUS PCR TO U OF MN - SOURCE: NORMAL
SARS-COV-2 RNA SPEC QL NAA+PROBE: NOT DETECTED

## 2020-11-13 PROCEDURE — 87635 SARS-COV-2 COVID-19 AMP PRB: CPT | Mod: 90

## 2020-11-13 PROCEDURE — 99207 PR NO BILLABLE SERVICE THIS VISIT: CPT

## 2020-12-14 ENCOUNTER — HEALTH MAINTENANCE LETTER (OUTPATIENT)
Age: 60
End: 2020-12-14

## 2021-02-27 ENCOUNTER — HEALTH MAINTENANCE LETTER (OUTPATIENT)
Age: 61
End: 2021-02-27

## 2021-04-03 ENCOUNTER — IMMUNIZATION (OUTPATIENT)
Dept: FAMILY MEDICINE | Facility: CLINIC | Age: 61
End: 2021-04-03
Payer: MEDICARE

## 2021-04-03 PROCEDURE — 0031A PR COVID VAC JANSSEN AD26 0.5ML: CPT

## 2021-04-03 PROCEDURE — 91303 PR COVID VAC JANSSEN AD26 0.5ML: CPT

## 2021-04-18 ENCOUNTER — HEALTH MAINTENANCE LETTER (OUTPATIENT)
Age: 61
End: 2021-04-18

## 2021-05-19 NOTE — PROGRESS NOTES
"    Assessment & Plan     (E11.9) Type 2 diabetes mellitus without complication, without long-term current use of insulin (H)  (primary encounter diagnosis)  Comment: doing well with diet control, no new medication  Plan: Hemoglobin A1c (P ), Microalbumin Random Ur        ([facility])        Encourage weight loss, reduce carbs    (Z12.4) Pap smear for cervical cancer screening  Comment: normal appearing  Plan: GYN Cytology ([facility])            (R10.2) Pelvic pain in female  Comment: Cystocoele causing the discomfort and likely voiding difficulties, no signs of acute infections  Plan: Urinalysis, Micro If ([facility]), Wet Prep ([facility]         [facility]), Chlamydia/Gono Amplified ([facility])        Referral    (R39.15) Urinary urgency  Comment: likely multifactorial  Plan: Urine Microscopic ([facility])            (E66.01) Morbid obesity (H)  Comment: weight loss encouraged, discussed intermittent fasting  Plan: discussed possible intermittent fasting (eating time noon-8 pm, rest of time fasting)    (N81.11) Cystocele, midline  Comment: discussed patient concerns of surgery and mesh, may be eligible for pessary  Plan: ADULT UROLOGY REFERRAL        Discuss with provider    (N39.8) Voiding dysfunction  Comment: referral  Plan: ADULT UROLOGY REFERRAL            (L72.3) Sebaceous cyst  Comment: reassured, small perifollicular cysts  Plan: avoid, continue no use of razors.  Avoid scratching, prn bacitracin if they become inflammed, discussed procedure to tana if needed      53 minutes spent on the date of the encounter doing chart review, history and exam, documentation and further activities per the note       BMI:   Estimated body mass index is 37.02 kg/m  as calculated from the following:    Height as of this encounter: 1.6 m (5' 3\").    Weight as of this encounter: 94.8 kg (209 lb).       CONSULTATION/REFERRAL to Urology    No follow-ups on file.    Juani Edward MD  [facility]    Subjective " "  Sushila is a 60 year old who presents for the following health issues     HPI     1. DM: diet controlled, did gain weight recently with stress of pandemic  -does randomly check sugars, AM fasting was 109, numbers are usually around that    2. Urinary urgency years:  -tried a med years ago, but didn't tolerate  -has had sometimes doesn't empty well,   -tried a medicine but had side effect of migraines    3. Boils/pimples  Does get them around the skin folds and does itch (buttock, groin, labia)  -not using anything, but frustrated and wants a cream, doesn't worry about std, no hx and one partner, last intercourse 1 year ago, uses condoms            Review of Systems   CONSTITUTIONAL: NEGATIVE for fever, chills, change in weight  ENT/MOUTH: NEGATIVE for ear, mouth and throat problems  RESP: NEGATIVE for significant cough or SOB  CV: NEGATIVE for chest pain, palpitations or peripheral edema      Objective    /82   Pulse 104   Resp 18   Ht 1.6 m (5' 3\")   Wt 94.8 kg (209 lb)   SpO2 97%   BMI 37.02 kg/m    Body mass index is 37.02 kg/m .  Physical Exam   GENERAL: healthy, alert and no distress  RESP: lungs clear to auscultation - no rales, rhonchi or wheezes  CV: regular rate and rhythm, normal S1 S2, no S3 or S4, no murmur, click or rub, no peripheral edema and peripheral pulses strong  ABDOMEN: soft, nontender, no hepatosplenomegaly, no masses and bowel sounds normal   (female): normal female external genitalia, normal urethral meatus , vaginal mucosa pink, slight ruggae, slightly pale, normal cervix, adnexae, and uterus without masses., cystocele present when bearing down, prolapses to introitus, labia majora with 2 (3mm firm perifollicular nodules) normal labia otherwise  Pap, wet prep and gc/chlam obtained  MS: no gross musculoskeletal defects noted, no edema    Results from the last 24 hours   Results for orders placed or performed in visit on 05/20/21 (from the past 24 hour(s))   Hemoglobin A1c ([facility])   Result Value Ref Range    Hemoglobin A1C 6.5 (H) 4.1 - 5.7 %   Urinalysis, Micro If ([facility])   Result Value Ref Range    Specific Gravity Urine <=1.005 1.005 - 1.030    pH Urine 6.0 4.5 - 8.0    Leukocyte Esterase UR Negative NEGATIVE    Nitrite Urine Negative NEGATIVE mg/dL    Protein UR Negative NEGATIVE mg/dL    Glucose Urine Negative NEGATIVE    Ketones Urine Negative NEGATIVE mg/dL    Urobilinogen mg/dL 0.2 E.U./dL 0.2 E.U./dL E.U./dL    Bilirubin UR Negative NEGATIVE mg/dL    Blood UR Trace-intact (A) NEGATIVE mg/dL    Color Urine Yellow     Clarity, urine Clear    Wet Prep ([facility])   Result Value Ref Range    Yeast Wet Prep None none    Motile Trichomonas Wet Prep Negative Negative    Clue Cells Wet Prep None NONE    WBC WET PREP <2 2 - 5    Bacteria Wet Prep None None    pH Wet Prep Not performed 3.8 - 4.5    Odor Wet Prep None NONE   Urine Microscopic ([facility])   Result Value Ref Range    WBC Urine None <5 /hpf    RBC Urine <2 <5 /hpf    Epithelial Cells UR <2 0 - 2 /lpf    Mucous Urine None NONE lpf    Casts Urine None NONE /lpf    Crystal Urine None NONE /lpf    Bacteria Wet Prep None None     OB History    Para Term  AB Living   6 4 4 0 2 4   SAB TAB Ectopic Multiple Live Births   0 0 0 1 0      # Outcome Date GA Lbr Jose L/2nd Weight Sex Delivery Anes PTL Lv   6 AB            5 AB            4 Term            3 Term            2 Term            1 Term

## 2021-05-20 ENCOUNTER — OFFICE VISIT (OUTPATIENT)
Dept: FAMILY MEDICINE | Facility: CLINIC | Age: 61
End: 2021-05-20
Payer: MEDICARE

## 2021-05-20 ENCOUNTER — TELEPHONE (OUTPATIENT)
Dept: FAMILY MEDICINE | Facility: CLINIC | Age: 61
End: 2021-05-20

## 2021-05-20 VITALS
OXYGEN SATURATION: 97 % | BODY MASS INDEX: 37.03 KG/M2 | HEIGHT: 63 IN | HEART RATE: 104 BPM | WEIGHT: 209 LBS | SYSTOLIC BLOOD PRESSURE: 119 MMHG | RESPIRATION RATE: 18 BRPM | DIASTOLIC BLOOD PRESSURE: 82 MMHG

## 2021-05-20 DIAGNOSIS — R10.2 PELVIC PAIN IN FEMALE: ICD-10-CM

## 2021-05-20 DIAGNOSIS — E66.01 MORBID OBESITY (H): ICD-10-CM

## 2021-05-20 DIAGNOSIS — Z00.00 WELLNESS EXAMINATION: Primary | ICD-10-CM

## 2021-05-20 DIAGNOSIS — G89.29 CHRONIC LEFT-SIDED LOW BACK PAIN WITH LEFT-SIDED SCIATICA: ICD-10-CM

## 2021-05-20 DIAGNOSIS — N39.8 VOIDING DYSFUNCTION: ICD-10-CM

## 2021-05-20 DIAGNOSIS — L72.3 SEBACEOUS CYST: ICD-10-CM

## 2021-05-20 DIAGNOSIS — M54.42 CHRONIC LEFT-SIDED LOW BACK PAIN WITH LEFT-SIDED SCIATICA: ICD-10-CM

## 2021-05-20 DIAGNOSIS — N81.11 CYSTOCELE, MIDLINE: ICD-10-CM

## 2021-05-20 DIAGNOSIS — R39.15 URINARY URGENCY: ICD-10-CM

## 2021-05-20 DIAGNOSIS — E11.9 TYPE 2 DIABETES MELLITUS WITHOUT COMPLICATION, WITHOUT LONG-TERM CURRENT USE OF INSULIN (H): Primary | ICD-10-CM

## 2021-05-20 DIAGNOSIS — Z12.4 PAP SMEAR FOR CERVICAL CANCER SCREENING: ICD-10-CM

## 2021-05-20 LAB
BACTERIA: NORMAL
BACTERIA: NORMAL
BILIRUBIN UR: NEGATIVE MG/DL
BLOOD UR: ABNORMAL MG/DL
CASTS: NORMAL /LPF
CLARITY, URINE: CLEAR
CLUE CELLS: NORMAL
COLOR UR: YELLOW
CREAT UR-MCNC: 24.3 MG/DL
CRYSTAL URINE: NORMAL /LPF
EPITHELIAL CELLS UR: <2 /LPF (ref 0–2)
GLUCOSE URINE: NEGATIVE
HBA1C MFR BLD: 6.5 % (ref 4.1–5.7)
KETONES UR QL: NEGATIVE MG/DL
LEUKOCYTE ESTERASE UR: NEGATIVE
MICROALBUMIN UR-MCNC: <0.5 MG/DL (ref 0–1.99)
MICROALBUMIN/CREAT UR: NORMAL MG/G{CREAT}
MOTILE TRICHOMONAS: NEGATIVE
MUCOUS URINE: NORMAL LPF
NITRITE UR QL STRIP: NEGATIVE MG/DL
ODOR: NORMAL
PH UR STRIP: 6 [PH] (ref 4.5–8)
PH WET PREP: NORMAL (ref 3.8–4.5)
PROTEIN UR: NEGATIVE MG/DL
RBC URINE: <2 /HPF
SP GR UR STRIP: <=1.005 (ref 1–1.03)
UROBILINOGEN UR STRIP-ACNC: ABNORMAL E.U./DL
WBC URINE: NORMAL /HPF
WBC WET PREP: <2 (ref 2–5)
YEAST: NORMAL

## 2021-05-20 PROCEDURE — 81001 URINALYSIS AUTO W/SCOPE: CPT | Performed by: FAMILY MEDICINE

## 2021-05-20 PROCEDURE — 83036 HEMOGLOBIN GLYCOSYLATED A1C: CPT | Performed by: FAMILY MEDICINE

## 2021-05-20 PROCEDURE — 36415 COLL VENOUS BLD VENIPUNCTURE: CPT | Performed by: FAMILY MEDICINE

## 2021-05-20 PROCEDURE — 99215 OFFICE O/P EST HI 40 MIN: CPT | Performed by: FAMILY MEDICINE

## 2021-05-20 PROCEDURE — 99207 PR NO BILLABLE SERVICE THIS VISIT: CPT

## 2021-05-20 PROCEDURE — 87210 SMEAR WET MOUNT SALINE/INK: CPT | Performed by: FAMILY MEDICINE

## 2021-05-20 RX ORDER — BACITRACIN ZINC 500 [USP'U]/G
OINTMENT TOPICAL 2 TIMES DAILY
Qty: 28 G | Refills: 4 | Status: SHIPPED | OUTPATIENT
Start: 2021-05-20 | End: 2021-10-28

## 2021-05-20 RX ORDER — CYCLOBENZAPRINE HCL 10 MG
10 TABLET ORAL 3 TIMES DAILY PRN
Qty: 42 TABLET | Refills: 1 | Status: SHIPPED | OUTPATIENT
Start: 2021-05-20 | End: 2022-08-16

## 2021-05-20 RX ORDER — BACITRACIN ZINC 500 [USP'U]/G
OINTMENT TOPICAL 2 TIMES DAILY
Qty: 28 G | Refills: 4 | Status: SHIPPED | OUTPATIENT
Start: 2021-05-20 | End: 2021-05-20

## 2021-05-20 ASSESSMENT — MIFFLIN-ST. JEOR: SCORE: 1497.15

## 2021-05-20 NOTE — TELEPHONE ENCOUNTER
[facility] phone call message- medication clarification/question:    Full Medication Name: bacitracin 500 UNIT/GM external ointment    Dose:     Question: Pharmacy called needing to know where medication is going to be applied to for insurance purposes. Please call and advise.      Pharmacy confirmed as [id number] - [place], [place] - [address]: Yes    OK to leave a message on voice mail? Yes    Primary language: English      needed? No    Call taken on May 20, 2021 at 12:01 PM by Merlyn Shrestha

## 2021-05-20 NOTE — PROGRESS NOTES
Medicare Wellness Visit  Health Risk Assessment           Health Risk Assessment / Review of Systems     Constitutional: Any fevers or night sweats? No     Eyes:  Vision problems   No, wear eyeglasses, last eye exam x 2 years ago due to pandemic.    Hearing Do you feel you have hearing loss?  No     Cardiovascular: Any chest pain, fast or irregular heart beat, calf pain with walking?     No           Respiratory:   Any breathing problems or cough?   No     Gastrointestinal: Any stomach or stool problems?   No- hx constipation. Take stool softener with laxative which has been effective. Will plan to start taking fiber.      Genitourinary: Do you have difficulty controlling urination?   Yes, wears panty shields,would have urinary leakage.  Would also like to discuss inability to start urine stream. Has urge to urinate when sit on toilet, difficult to create stream, has been x 1 year. Initially was diagnosed with overactive bladder but now issue with urine stream. No dysuria or hematuria. No hx kidney stones.    Muscles and Joints: Any joint stiffness or soreness? Yes, generalized throughout body,more upper back by bilateral scapula. Mother has hx fibromyalgia.    Skin: Any concerning lesions or moles?  Yes- check boil on labia.    Nervous System: Any loss of strength or feeling, numbness or tingling, shaking, dizziness, or headache?   YES -bilateral hands loss of strength. Works at home on computer. On top of right foot, feel occasional vibration sensation. No injury.    Mental Health: Any depression, anxiety or problems sleeping?     YES - body aches keep [place] up at night. Increase stress as majority of family come to Sushila with their stress and count on her support. Coming up to a year since her 50 years old brother had passed from lung condition.    Cognition: Do you have any problems with your memory?  No            Medical Care     What other specialists or organizations are involved in your medical care?   NONE  Patient Care Team       Relationship Specialty Notifications Start End    Moustapha Calderon MD PCP - General Student in organized health care education/training program  7/1/19     Phone: [phone] Fax: [phone] [address] [place] [address]          Have you been to the ER or overnight in the hospital in the last year?  No          Social History / Home Safety       Marital Status:Single  Who lives in your household? self    Do you feel threatened or controlled by a partner, ex-partner or anyone in your life? No     Has anyone hurt you physically, for example by pushing, hitting, slapping or kicking you   or forcing you to have sex? No          Does your home have any of the following safety concerns; loose rugs in the hallway,  bathrooms with no grab bars by the tub or toilet, stairs with no handrails or poorly lit areas?  No     Do you need help with dressing yourself, bathing, eating or getting around your home?  No     Do you need help with the phone, transportation, shopping, preparing meals, housework, laundry, medications or managing money?No       Risk Behaviors and Healthy Habits     History   Smoking Status     Former Smoker     Types: Cigarettes     Quit date: 12/13/2018   Smokeless Tobacco     Never Used     How many servings of fruits and vegetables do you eat a day? On average 2 servings a day    Exercise: has not used treadmill x 1 month but will plan on using it again as well as walk outside of home to help ease stress mentally. Walk x 30 mins twice a week. Stretches x 10 mins a day,daily.     Do you frequently drive without a seatbelt? No     Do you use tobacco?  No     Do you use any other drugs? No         Do you use alcohol?No      Frailty Assessment            Have you lost 10 or more pounds unintentionally in the previous year? No     How difficult is walking from one room to the other on the same level?not       Is it difficult to lift or carry something as  heavy as 10 pounds?moderately      Compared with most (men/women) your age, would you say  that you are more active, less active, or about the same? less      FALL RISK ASSESSMENT 5/20/2021 1/11/2019   Fallen 2 or more times in the past year? No No   Any fall with injury in the past year? No No   Timed Up and Go Test/Seconds (13.5 is a fall risk; contact physician) 11 -         Advance Directives:   Discussed with patient and family as appropriate. Has patient  completed advance directives and/or a living will? No-blank copy given to Sushila to bring home, review and complete it at her convenience.      Elli Stephen RN

## 2021-05-20 NOTE — LETTER
"May 26, 2021      Sushila Flores  [address]   [place] [address]        Dear Sushila,    Please see below for your test results.    Resulted Orders   Hemoglobin A1c (P )   Result Value Ref Range    Hemoglobin A1C 6.5 (H) 4.1 - 5.7 %   Microalbumin Random Ur ([facility])   Result Value Ref Range    Microalbumin, Urine <0.50 0.00 - 1.99 mg/dL    Creatinine, Urine 24.3 mg/dL    Albumin Urine mg/g Cr See Note.       Comment:      \"Unable to calculate: Creatinine and/or Microalbumin value below detectable   level\"      Narrative    Test performed by:  [facility]  [address], [place] [address]  Microalbumin, Random Urine  <2.0 mg/dL . . . . . . . . Normal  3.0-30.0 mg/dL . . . . . . Microalbuminuria  >30.0 mg/dL . . . . . .  . Clinical Proteinuria  Microalbumin/Creatinine Ratio, Random Urine  <20 mg/g . . . . .. . . . Normal   mg/g . . . . . . . Microalbuminuria  >300 mg/g . . . . . . . . Clinical Proteinuria   Urinalysis, Micro If ([facility])   Result Value Ref Range    Specific Gravity Urine <=1.005 1.005 - 1.030    pH Urine 6.0 4.5 - 8.0    Leukocyte Esterase UR Negative NEGATIVE    Nitrite Urine Negative NEGATIVE mg/dL    Protein UR Negative NEGATIVE mg/dL    Glucose Urine Negative NEGATIVE    Ketones Urine Negative NEGATIVE mg/dL    Urobilinogen mg/dL 0.2 E.U./dL 0.2 E.U./dL E.U./dL    Bilirubin UR Negative NEGATIVE mg/dL    Blood UR Trace-intact (A) NEGATIVE mg/dL    Color Urine Yellow     Clarity, urine Clear    GYN Cytology ([facility])   Result Value Ref Range    Lab AP Case Report SEE RESULTS BELOW       Comment:      Gynecologic Cytology Report                       Case: [id number]                                     Authorizing Provider:  Juani Edward MD     Collected:             05/20/2021 1118              Ordering Location:     [facility]      Received:              05/21/2021 0548                                     [facility] "                                                                                     Laboratory                                                                     First Screen:          Rosalina Welch CT                                                                                 (ASCP)                                                                         Specimen:    SUREPATH PAP, SCREENING, Endocervical/cervical                                               Lab AP Gyn Interpretation SEE RESULTS BELOW Negative for squamous intraepithelial le      Comment:      Negative for squamous intraepithelial lesion or malignancy  Electronically signed by Rosalina Welch CT (ASCP) on 5/26/2021 at 12:45 PM      Lab AP Malignant? Normal Normal    Specimen adequacy:       Satisfactory for evaluation, endocervical/transformation zone component present    HPV Reflex? Yes regardless of result     High Risk? No     Last Mens Period 10 years     Lab AP Abnormal Bleeding No     Lab AP Patient Status na     Lab AP Birth Control/Hormones None     Lab AP Previous Normal 2014     Lab AP Previous Abnl no     Lab AP Cervical Appearance normal     Narrative    Test performed by:  [facility]  [address] [place] [address]   Wet Prep ([facility])   Result Value Ref Range    Yeast Wet Prep None none    Motile Trichomonas Wet Prep Negative Negative    Clue Cells Wet Prep None NONE    WBC WET PREP <2 2 - 5    Bacteria Wet Prep None None    pH Wet Prep Not performed 3.8 - 4.5    Odor Wet Prep None NONE   Chlamydia/Gono Amplified ([facility])   Result Value Ref Range    Chlamydia trac,Amplified Prb Negative Negative    N gonorrhoeae,Amplified Prb Negative Negative    Narrative    Test performed by:  [facility]  [address] [place] [address]   Urine Microscopic ([facility])   Result Value Ref Range    WBC Urine None <5 /hpf    RBC Urine <2 <5 /hpf    Epithelial Cells  UR <2 0 - 2 /lpf    Mucous Urine None NONE lpf    Casts Urine None NONE /lpf    Crystal Urine None NONE /lpf    Bacteria Wet Prep None None   HPV [place] PCR ([facility] [employer])   Result Value Ref Range    HPV Source SurePath     HPV 16 DNA Negative NEG    HPV 18 DNA Negative NEG    Other HR HPV Negative NEG    Final Diagnosis SEE NOTES       Comment:      This patient's sample is negative for HPV DNA.  This test was developed and its performance characteristics determined by the  [facility], [facility]. It  has not been cleared or approved by the FDA. The laboratory is regulated under  CLIA as qualified to perform high-complexity testing. This test is used for  clinical purposes. It should not be regarded as investigational or for  research.  (Note)  METHODOLOGY:  The Roche bob 4800 system uses automated extraction,  simultaneous amplification of HPV (L1 region) and beta-globin,  followed by  real time detection of fluorescent labeled HPV and beta  globin using specific oligonucleotide probes . The test specifically  identifies types HPV 16 DNA and HPV 18 DNA while concurrently  detecting the rest of the high risk types (31, 33, 35, 39, 45, 51,  52, 56, 58, 59, 66 or 68).  COMMENTS:  This test is not intended for use as a screening device  for women under age 30 with normal cervical cy tology.  Results should  be correlated with cytologic and histologic findings. Close clinical  followup is recommended.      Specimen Description Cervical Cells       Comment:      Performed and/or entered by:  [facility]  [address] [place] [address]       Narrative    Test performed by:  [facility]ager  [address], [address], [place] [address]       If you have any questions, please call the clinic to make an appointment.    Sincerely,    Juani Edward MD

## 2021-05-20 NOTE — PATIENT INSTRUCTIONS
PERSONAL PREVENTIVE SERVICES PLAN - SERVICES     Review these tests with your medical staff then decide which ones you want and take this page home for your reference      SCREENING TESTS     Description   Year of Last Screening   Recommended Today?   Heart disease screening blood tests    Cholesterol level Reducing cholesterol can reduce your risk of heart attacks by 25%.  Screening is recommended yearly if you are at risk of heart disease otherwise every 4-5 years 8/6/20  On statin No: is not indicated today.   Diabetes screening tests    Hemoglobin A1c blood test   Finding and treating diabetes early can reduce complications.  Screening recommended/covered yearly if you have high blood pressure, high cholesterol, obesity (BMI >30), or a history of high blood glucose tests; or 2 of the following: family history of diabetes, overweight (BMI >25 but <30), age 65 years or older, and a history of diabetes of pregnancy or gave birth to baby weighing more than 9 lbs. 8/6/20  hgbA1c  6.5 Yes; Recommended.   Hepatitis B screening Finding hepatitis B early can reduce complications.  Screening is recommended for persons with selected risk factors.  No: is not indicated today.   Hepatitis C screening Finding hepatitis C early can reduce complications.  Screening is recommended for all persons born from 1945 through 1965 and for those with selected other risk factors.  3/17/2017  negative No: is not indicated today.   HIV screening Finding HIV early can reduce complications.  Screening is recommended for persons with risk factors for HIV infection. 5/30/2018  negative No: is not indicated today.   Glaucoma screening Early detection of glaucoma can prevent blindness.   Please talk to your eye doctor about this.       SCREENING TESTS     Description   Year of Last Screening   Recommended Today?   Colorectal cancer screening    Fecal occult blood test     Screening colonoscopy Screening for colon cancer has been shown to reduce  death from colon cancer by 25-30%. Screening recommended to start at 50 years and continuing until age 75 years.   8/24/17  Rectal polyp, diverticulosis of colon w/o diverticulitis, internal hemorrhoid Due for repeat in 2027   Breast Cancer Screening (women)    Mammogram Mammogram screening for breast cancer has been shown to reduce the risk of dying from breast cancer and prolong life. Screening is recommended every 1-2 years for women aged 50 to 74 years.  8/20/2019  negative Yes; Recommended    Cervical Cancer screening (women)    Pap Cervical pap smears can reduce cervical cancer. Screening is recommended annually if high risk (history of abnormal pap smears) otherwise every 2-3 years, stop screening at 65 years of age if history of normal paps. 12/24/15 Yes; Recommended.   Screening for Osteoporosis:  Bone mass measurements (women)    Dexa Scan Screening and treating Osteoporosis can reduce the risk of hip and spine fractures. Screening is recommended in women 65 years or older and in women and men at risk of osteoporosis.  No: is not indicated today.   Screening for Lung Cancer     Low-dose CT scanning Screening can reduce mortality in persons aged 55-80 who have smoked at least 30 pack-years and who are either still smoking or have quit in the past 15 years.  No: is not indicated today.   Abdominal Aortic Aneurysm (AAA) screening    Ultrasound (US)   An aneurysm treated before rupture is very safe -a ruptured aneurysm can be fatal.  Screening  by US for AAA is limited to patients who meet one of the following criteria:    Men who are 65-75 years old and have smoked more than 100 cigarettes in their lifetime    Anyone with a family history of abdominal aortic aneurysm  No: is not indicated today.     Here are your recommended immunizations.  Take this home for your reference.                                                    IMMUNIZATIONS Description Recommend today?     Influenza (Flu shot) Prevents flu;  should get every year No: is not indicated today.   PCV 13 Pneumonia vaccination; you get it once No; not indicated today   PPSV 23 Second pneumonia vaccination; usually get it 1 year after PCV 13 No; is up to date.   Zoster (Shingles) Prevents shingles; you get it once  (Check with Part D insurance for coverage, must receive at a pharmacy, not clinic) No: is not indicated today.   Tetanus Prevents tetanus; once every 10 years No; is up to date.     Hepatitis B  If you have any of the following risk factors you should be immunized for hepatitis B: severe kidney disease, people who live in the same house as a carrier of Hepatitis B virus, people who live in  institutions (e.g. nursing homes or group homes), homosexual men, patients with hemophilia who received Factor VIII or IX concentrates, abusers of illicit injectable drugs No: is not indicated today.      PATIENT INSTRUCTIONS    Yearly exam:     See your health care provider every year in order to review changes in your health, review medicines that you take, and discuss preventive care needs such as immunizations and cancer screening.    Get a flu shot each year.     Advance Directives:    If you have not done so, you are encouraged to complete advance directives and/or a living will.   More information about advance directives can be found at: http://www.[place]med.org/advocacy/Key-Issues/Advance-Directives    Nutrition:     Eat at least 5 servings of fruits and vegetables each day.     Eat whole-grain bread, whole-wheat pasta and brown rice instead of white grains and rice.     Talk to your doctor about Calcium and Vitamin D.     Lifestyle:    Exercise for at least 150 minutes a week (30 minutes a day, 5 days a week). This will help you control your weight and prevent disease.     Limit alcohol to one drink per day.     If you smoke, try to quit - your doctor will be happy to help.     Wear sunscreen to prevent skin cancer.     See your dentist every six months for  an exam and cleaning.     See your eye doctor every 1 to 2 years to screen for conditions such as glaucoma, macular degeneration and cataracts.

## 2021-05-20 NOTE — LETTER
"May 21, 2021      Sushila Flores  [address]   [place] [address]        Dear ,    We are writing to inform you of your test results.    Your test results fall within the expected range(s) or remain unchanged from previous results.  Please continue with current treatment plan.    Resulted Orders   Hemoglobin A1c ([facility])   Result Value Ref Range    Hemoglobin A1C 6.5 (H) 4.1 - 5.7 %   Microalbumin Random Ur ([facility])   Result Value Ref Range    Microalbumin, Urine <0.50 0.00 - 1.99 mg/dL    Creatinine, Urine 24.3 mg/dL    Albumin Urine mg/g Cr See Note.       Comment:      \"Unable to calculate: Creatinine and/or Microalbumin value below detectable   level\"      Narrative    Test performed by:  [facility]  [address] [place] [address]  Microalbumin, Random Urine  <2.0 mg/dL . . . . . . . . Normal  3.0-30.0 mg/dL . . . . . . Microalbuminuria  >30.0 mg/dL . . . . . .  . Clinical Proteinuria  Microalbumin/Creatinine Ratio, Random Urine  <20 mg/g . . . . .. . . . Normal   mg/g . . . . . . . Microalbuminuria  >300 mg/g . . . . . . . . Clinical Proteinuria   Urinalysis, Micro If ([facility])   Result Value Ref Range    Specific Gravity Urine <=1.005 1.005 - 1.030    pH Urine 6.0 4.5 - 8.0    Leukocyte Esterase UR Negative NEGATIVE    Nitrite Urine Negative NEGATIVE mg/dL    Protein UR Negative NEGATIVE mg/dL    Glucose Urine Negative NEGATIVE    Ketones Urine Negative NEGATIVE mg/dL    Urobilinogen mg/dL 0.2 E.U./dL 0.2 E.U./dL E.U./dL    Bilirubin UR Negative NEGATIVE mg/dL    Blood UR Trace-intact (A) NEGATIVE mg/dL    Color Urine Yellow     Clarity, urine Clear    Wet Prep ([facility])   Result Value Ref Range    Yeast Wet Prep None none    Motile Trichomonas Wet Prep Negative Negative    Clue Cells Wet Prep None NONE    WBC WET PREP <2 2 - 5    Bacteria Wet Prep None None    pH Wet Prep Not performed 3.8 - 4.5    Odor Wet Prep None NONE   Chlamydia/Gono " Amplified ([employer])   Result Value Ref Range    Chlamydia trac,Amplified Prb Negative Negative    N gonorrhoeae,Amplified Prb Negative Negative    Narrative    Test performed by:  [facility] [address] [place] [address]   Urine Microscopic ([facility])   Result Value Ref Range    WBC Urine None <5 /hpf    RBC Urine <2 <5 /hpf    Epithelial Cells UR <2 0 - 2 /lpf    Mucous Urine None NONE lpf    Casts Urine None NONE /lpf    Crystal Urine None NONE /lpf    Bacteria Wet Prep None None       If you have any questions or concerns, please call the clinic at the number listed above.       Sincerely,      Juani Edward MD

## 2021-05-21 LAB
C TRACH RRNA CVX QL NAA+PROBE: NEGATIVE
FINAL DIAGNOSIS: NORMAL
HPV HR 12 DNA CVX QL NAA+PROBE: NEGATIVE
HPV16 DNA SPEC QL NAA+PROBE: NEGATIVE
HPV18 DNA SPEC QL NAA+PROBE: NEGATIVE
N GONORRHOEA RRNA SPEC QL NAA+PROBE: NEGATIVE
SPECIMEN DESCRIPTION: NORMAL
SPECIMEN SOURCE CVX/VAG CYTO: NORMAL

## 2021-05-25 ENCOUNTER — PRE VISIT (OUTPATIENT)
Dept: UROLOGY | Facility: CLINIC | Age: 61
End: 2021-05-25

## 2021-05-25 NOTE — TELEPHONE ENCOUNTER
Reason for Visit: Consult    Diagnosis: Midline cystocele    Orders/Procedures/Records: in system    Contact Patient: n/a    Rooming Requirements: Justo Nye  05/25/21  2:14 PM

## 2021-05-26 ENCOUNTER — RECORDS - HEALTHEAST (OUTPATIENT)
Dept: ADMINISTRATIVE | Facility: OTHER | Age: 61
End: 2021-05-26

## 2021-05-26 LAB
CYTOLOGY CVX/VAG DOC THIN PREP: NORMAL
HIGH RISK?: NO
HPV REFLEX?: NORMAL
LAB AP ABNORMAL BLEEDING: NO
LAB AP BIRTH CONTROL/HORMONES: NORMAL
LAB AP CERVICAL APPEARANCE: NORMAL
LAB AP PATIENT STATUS: NORMAL
LAB AP PREVIOUS ABNL: NO
LAB AP PREVIOUS NORMAL: 2014
LAST MENS PERIOD: NORMAL
PATH REPORT.COMMENTS IMP SPEC: NORMAL
PATH REPORT.COMMENTS IMP SPEC: NORMAL
SPECIMEN ADEQUACY:: NORMAL

## 2021-05-26 NOTE — RESULT ENCOUNTER NOTE
Normal   1. Pap smear result from May 20, 2021  is: Normal HPV negative  2. Date next due for routine pap: 5 years with co testing (2026)   3. Follow up colposcopy is: Not indicated   Juani Edward MD

## 2021-05-26 NOTE — PROGRESS NOTES
[facility] Daily Progress     Patient Name: Sushila Flores  Date: 3/22/2019  Visit #: 2  PTA visit #:  NA  Referral Diagnosis: Spinal stenosis of lumbar region with neurogenic claudication  Referring provider: Herson Buck*  Visit Diagnosis:     ICD-10-CM    1. 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 Chronic bilateral low back pain without sciatica M54.5     G89.29    3. Muscle weakness (generalized) M62.81      Sushila Flores is a 58 y.o. female who presents to therapy today with chief complaints of chronic low back pain, especially on the L side. Onset date of sx was 2018 with chronic pain since 2004.  Pt reported h/o OA, obesity, and type 2 diabetes.  Pain symptoms are burning, throbbing, and sharp in nature, and rated 4-10/10.  Functional impairments include walking, standing, housework, showering, sleeping, and running errands.  Pt demo's signs and sx consistent with L sided SI joint dysfunction and chronic LBP.     Precautions / Restrictions : Spondylolisthesis lumbar spine      Assessment:     HEP/POC compliance is  good .  The patient demonstrates overall improvements in her pain symptoms, gait pattern, and pelvic stability.  She is making progress toward goals and is appropriate to continue with PT services at this time.    Goal Status:  Pt. 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  Progress with home program as tolerated.  Continue with core strengthening.  Recheck pelvic landmarks.    Subjective:     Pain Ratin  The patient reports that her exercises have been going well.  Overall, her pain has been better.  She is still having issues sleeping  at night and waking the same number of times as before.  The exercises do help alleviate her pain symptoms.    Objective:     Pelvic landmarks level.    Exercise #1: Ab sets  Comment #1: x 10; progressed to supine marching x 10 with cues to keep pelvis level  Exercise #2: PPT  Comment #2: x 10; cues for posterior tilt  Exercise #3: Bridging with pillow squeeze  Comment #3: x 10  Exercise #4: Hip abduction  Comment #4: supine with L3 band x 10; count to 5 out and count to 5 in    Appt time: 1:18PM - 1:48PM    Treatment Today     TREATMENT MINUTES COMMENTS   Evaluation     Self-care/ Home management     Manual therapy     Neuromuscular Re-education     Therapeutic Activity     Therapeutic Exercises 30 -NUSTEP x 5 minutes WL 5.0; subjective measures taken  -See flowsheet   Gait training     Modality__________________                Total 30    Blank areas are intentional and mean the treatment did not include these items.       Madelin Ojeda, PT, DPT  3/22/2019

## 2021-05-27 NOTE — TELEPHONE ENCOUNTER
Second phone call to patient to discuss follow up appointment and about her having shingles and refill.

## 2021-05-27 NOTE — TELEPHONE ENCOUNTER
Phone call to patient to clarify amount of Cymbalta she is taking to do a refill. Left voicemail to call back.

## 2021-05-28 NOTE — PATIENT INSTRUCTIONS - HE
Prescribed Gabapentin today, 100 mg tablets, to be titrated up to 3 tablets 3 times a day as tolerated for your nerve pain. Please follow Gabapentin dosing chart below.    Gabapentin 100mg Dosing Chart    DATE  MORNING AFTERNOON BEDTIME    Day 1 0 0 1    Day 2 0 0 1    Day 3 0 0 1    Day 4 1 0 1    Day 5 1 0 1    Day 6 1 0 1    Day 7 1 1 1    Day 8 1 1 1    Day 9 1 1 1    Day 10 1 1 2    Day 11 1 1 2    Day 12 1 1 2    Day 13 2 1 2    Day 14 2 1 2    Day 15 2 1 2    Day 16 2 2 2    Day 17 2 2 2    Day 18 2 2 2    Day 19 2 2 3    Day 20 2 2 3    Day 21 2 2 3    Day 22 3 2 3    Day 23 3 2 3    Day 24 3 2 3    Day 25 3 3 3    Day 26 3 3 3    Day 27 3 3 3     Continue medication, taking 3 capsules three times daily    Please call if you have any questions regarding how to take your medication  Clinic Phone # [phone]        I have ordered Celebrex as ibuprofen has been hard on your stomach.  Please take this 1-2 times daily as needed with food.  Do not take ibuprofen with this.    I refilled your Flexeril.    Once the rash from your shingles is gone please call and make another appointment for physical therapy.  Until that time please continue doing your exercises on a daily basis.    Please continue going up on your Cymbalta until you are at 60 mg a day.  Please call for refill.    ~Please call Nurse Navigation line [phone] with any questions or concerns about your treatment plan, if symptoms worsen and you would like to be seen urgently, or if you have problems controlling bladder and bowel function.

## 2021-05-28 NOTE — TELEPHONE ENCOUNTER
Phone call to patient to clarify how much Cymbalta she is taking to do the refill. Left voicemail to call me back.

## 2021-05-28 NOTE — PROGRESS NOTES
Assessment:     Diagnoses and all orders for this visit:    Spinal stenosis of lumbar region with neurogenic claudication  -     celecoxib (CELEBREX) 200 MG capsule; Take 1 capsule (200 mg total) by mouth 2 (two) times a day.  Dispense: 60 capsule; Refill: 0  -     cyclobenzaprine (FLEXERIL) 10 MG tablet; Take 1 tablet (10 mg total) by mouth 3 (three) times a day as needed for muscle spasms.  Dispense: 60 tablet; Refill: 0    Myofascial pain  -     celecoxib (CELEBREX) 200 MG capsule; Take 1 capsule (200 mg total) by mouth 2 (two) times a day.  Dispense: 60 capsule; Refill: 0  -     cyclobenzaprine (FLEXERIL) 10 MG tablet; Take 1 tablet (10 mg total) by mouth 3 (three) times a day as needed for muscle spasms.  Dispense: 60 tablet; Refill: 0    Chronic pain syndrome  -     celecoxib (CELEBREX) 200 MG capsule; Take 1 capsule (200 mg total) by mouth 2 (two) times a day.  Dispense: 60 capsule; Refill: 0  -     cyclobenzaprine (FLEXERIL) 10 MG tablet; Take 1 tablet (10 mg total) by mouth 3 (three) times a day as needed for muscle spasms.  Dispense: 60 tablet; Refill: 0    Fibromyalgia  -     celecoxib (CELEBREX) 200 MG capsule; Take 1 capsule (200 mg total) by mouth 2 (two) times a day.  Dispense: 60 capsule; Refill: 0  -     cyclobenzaprine (FLEXERIL) 10 MG tablet; Take 1 tablet (10 mg total) by mouth 3 (three) times a day as needed for muscle spasms.  Dispense: 60 tablet; Refill: 0    Cervicalgia  -     celecoxib (CELEBREX) 200 MG capsule; Take 1 capsule (200 mg total) by mouth 2 (two) times a day.  Dispense: 60 capsule; Refill: 0  -     cyclobenzaprine (FLEXERIL) 10 MG tablet; Take 1 tablet (10 mg total) by mouth 3 (three) times a day as needed for muscle spasms.  Dispense: 60 tablet; Refill: 0       Sushila Flores is a 58 y.o. y.o. female with past medical history significant for bilateral carpal tunnel syndrome, obesity, chronic back pain, osteoarthritis of multiple joints, sciatic nerve pain, cervical spondylosis  without myelopathy, type 2 diabetes, vitamin D deficiency who presents today for follow-up regarding low back and left greater than right lower limb pain:    -Unfortunately the patient has shingles under her right axilla area with moderate to severe pain.  Patient had been doing well with physical therapy and Cymbalta, however has not been going to physical therapy secondary to shingles.  Pain is likely myofascial in nature as patient does have fibromyalgia.  There is also neuropathic component with the shingles.     Plan:     A shared decision making plan was used. The patient's values and choices were respected. Prior medical records from her last visit on 2/4/2019 as well as physical therapy notes were reviewed today. The following represents what was discussed and decided upon by the provider and the patient.   Patient also does have symptoms of carpal tunnel syndrome left greater than right.  She is wearing her night splints.     -DIAGNOSTIC TESTS:  --MRI of the lumbar spine dated 12/6/2018 report is reviewed It does show mild L4-5 spondylolisthesis of L4-5 with severe right and moderate left facet joint arthritis and ligamentum flavum thickening.  There is moderate spinal canal stenosis at L4-5 as well as likely impingement of the bilateral L5 nerve roots in the lateral recess.  At L5-S1 there is also a 2 mm spondylolisthesis with severe left greater than right facet arthropathy.  Severe left foraminal stenosis.  --MRI of the lumbar spine dated 12/29/2015 report is reviewed.  Does show multilevel lower thoracic and lumbar disc and facet degeneration.  There is a 2 mm spondylolisthesis at L5-S1 with moderate lateral recess stenosis.  There is moderate foraminal stenosis at this level as well.  There is also a 2 mm spondylolisthesis of L4 on L5 with mild central canal stenosis and mild foraminal stenosis on the right.   --Labs dated 3/4/2019: CRP and sed rate are in normal range.  --Could consider EMG nerve  conduction study to further delineate carpal tunnel syndrome after shingles rash is gone.    -INTERVENTIONS: No interventions at this time.    -MEDICATIONS: Patient has tried gabapentin in the past, however it has been a little while.  She does have gabapentin at home.  I given her a chart that she can increase this medication until she is on 300 mg 3 times a day.  --Ibuprofen has been hard on her stomach.  I have ordered Celebrex 200 mg that she can take twice a day.  She should take this with food.  --I have also refilled her Flexeril.  --Recommend when she is on Cymbalta 60 mg that she stay on this dose and call for refills prior to going down off of it.    -  Discussed side effects of medications and proper use. Patient verbalized understanding.    -PHYSICAL THERAPY: Patient has had 2 sessions of physical therapy.  I recommended she continue doing her therapy sessions and doing exercises on a daily basis.  She stopped physical therapy as she had shingles and her therapist was pregnant.  She is continuing to do her exercise on a daily basis and when the rash is gone she will reconvene with physical therapy.    Discussed the importance of core strengthening, ROM, stretching exercises with the patient and how each of these entities is important in decreasing pain.  Explained to the patient that the purpose of physical therapy is to teach the patient a home exercise program.  These exercises need to be performed every day in order to decrease pain and prevent future occurrences of pain.        -PATIENT EDUCATION: We discussed pain management in a multimodal fashion including medication management, physical therapy, injections.  We also discussed shingles and possibilities of ongoing pain after rash is gone.    -FOLLOW UP: The patient follow-up in 6 weeks.  Advised to contact clinic if symptoms worsen or change.    Subjective:     Sushila Flores is a 58 y.o. female who presents today for follow-up regarding cervical,  thoracic, low back pain.  The patient initially was doing well with 60 mg of Cymbalta daily as well as physical therapy sessions.  Unfortunately she started having shingles underneath her right axilla area and has stopped physical therapy until she is better from that as her therapist is pregnant at this time.  She continues to do exercises, however low back thoracic spine and neck area are back to baseline.  She is also having carpal tunnel syndrome symptoms in both wrists.  She is wearing her night splints.  Pain under her right axilla where the shingles rash is is burning in nature and pain is quite severe especially at night.  Pain is worse in her cervical, thoracic, low back with prolonged standing and walking.  Pain is improved with laying down resting as well as when she was doing her physical therapy sessions exercises and full dose Cymbalta.  She ran out of Cymbalta and was restarted on Cymbalta 20 mg and will go up to 60 mg.  This but she thinks will also help with her pain.  She had been taking 800 mg of ibuprofen for her shingles area pain, however she was getting indigestion and has stopped taking this.  Patient has tried gabapentin in the past, however did not like the side effects, however is willing to retrial this with the amount of pain she is having with her shingles.  She denies any bowel or bladder changes, fevers, chills, unintentional weight loss.       -Treatment to Date: Left L4-5 transforaminal epidural steroid injection in the past.  MRI of the lumbar spine dated 12/6/2018.  MRI of the lumbar spine dated 12/29/2015.  Physical therapy in the past.  2 recent sessions of physical therapy.    Patient Active Problem List   Diagnosis     Bilateral carpal tunnel syndrome     BMI 36.0-36.9,adult     Chronic back pain     Osteoarthritis of multiple joints     Positive FIT (fecal immunochemical test)     Sciatic nerve pain     Spondylosis of cervical region without myelopathy or radiculopathy      Type 2 diabetes mellitus without complication (H)     Vitamin D deficiency       Current Outpatient Medications on File Prior to Encounter   Medication Sig Dispense Refill     atorvastatin (LIPITOR) 80 MG tablet Take 80 mg by mouth.       ibuprofen (ADVIL,MOTRIN) 800 MG tablet Take 800 mg by mouth every 6 (six) hours as needed for pain.       lidocaine 4 % patch Place 1 patch on the skin.       metFORMIN (GLUCOPHAGE-XR) 500 MG 24 hr tablet Take 500 mg by mouth.       polyethylene glycol (MIRALAX) 17 gram/dose powder Take by mouth.       DULoxetine (CYMBALTA) 20 MG capsule TAKE 1 CAPSULE (20 MG TOTAL) BY MOUTH DAILY. INCREASE BY 20 MG EVERY 7 DAYS UNTIL 60 MG 90 capsule 0     valACYclovir (VALTREX) 1000 MG tablet   0     [DISCONTINUED] cyclobenzaprine (FLEXERIL) 10 MG tablet Take 10 mg by mouth.       No current facility-administered medications on file prior to encounter.        Allergies   Allergen Reactions     Penicillins Unknown, Nausea And Vomiting and Swelling     lips  lips       Hydrocodone-Acetaminophen Rash     Oxycodone-Acetaminophen Rash       No past medical history on file.     Review of Systems  ROS:  Specifically negative for bowel/bladder dysfunction, balance changes, headache, dizziness, foot drop, fevers, chills, appetite changes, nausea/vomiting, unexplained weight loss. Otherwise 13 systems reviewed are negative. Please see the patient's intake questionnaire from today for details.    Reviewed Social, Family, Past Medical and Past Surgical history with patient, no significant changes noted since prior visit.     Objective:     There were no vitals taken for this visit.    PHYSICAL EXAMINATION:    --CONSTITUTIONAL: Well developed, well nourished, healthy appearing individual.  --PSYCHIATRIC: Appropriate mood and affect. No difficulty interacting due to temper, social withdrawal, or memory issues.  --SKIN: Varicella-zoster rash present under her right axilla  --RESPIRATORY: Normal rhythm and  effort. No abnormal accessory muscle breathing patterns noted.   --MUSCULOSKELETAL:  Normal lumbar lordosis noted, no lateral shift.  --GROSS MOTOR: Easily arises from a seated position. Gait is non-antalgic  --LUMBAR SPINE:  Inspection reveals no evidence of deformity. Range of motion is not limited in lumbar flexion, extension, lateral rotation.  She points to her cervical, thoracic, low back where she is tender.  --LOWER EXTREMITY MOTOR TESTING:  She has normal strength in bilateral lower extremities.  --Wrists: Positive Phalen bilaterally.  Left greater than right.  --NEUROLOGIC: Bilateral patellar and achilles reflexes are physiologic and symmetric. Sensation to light touch is intact in the bilateral L4, L5, and S1 dermatomes.    RESULTS:   Imaging: Lumbar spine imaging was reviewed today.

## 2021-05-29 NOTE — PROGRESS NOTES
[facility] Discharge Summary  Patient Name: Sushila Flores  Date: 6/3/2019  Referral Diagnosis: Spinal stenosis of lumbar region with neurogenic claudication  Referring provider: Herson Buck*  Visit Diagnosis:     ICD-10-CM    1. 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 Chronic bilateral low back pain without sciatica M54.5     G89.29    3. Muscle weakness (generalized) M62.81    4. BMI 36.0-36.9,adult Z68.36        Goals:  Pt. 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  The patient attended therapy initially, but did not finish the therapy sessions prescribed.  Goals were not fully achieved. Explanation for goals not achieved: Did not return to PT.  The patient discontinued therapy, did not return.  No further therapy is required at this time.  The patient cancelled her last 3 appointments becuase she had shingles and was planning on returning to PT after it cleared.  She has not returned since 3/22/19 and is appropriate for discharge at this time.    Home exercise program given to patient:  Exercise #1: Ab sets  Comment #1: Supine marching HEP  Exercise #2: PPT  Comment #2: HEP  Exercise #3: Bridging with pillow squeeze  Comment #3: HEP  Exercise #4: Hip abduction  Comment #4: supine with L3 band HEP      Therapy will be discontinued at this time.  The patient will need a new referral to resume.    Thank you for your referral.  Madelin Ojeda, PT, DPT  6/3/2019  7:58 AM

## 2021-06-02 ENCOUNTER — VIRTUAL VISIT (OUTPATIENT)
Dept: UROLOGY | Facility: CLINIC | Age: 61
End: 2021-06-02
Attending: FAMILY MEDICINE
Payer: MEDICARE

## 2021-06-02 ENCOUNTER — PRE VISIT (OUTPATIENT)
Dept: UROLOGY | Facility: CLINIC | Age: 61
End: 2021-06-02

## 2021-06-02 VITALS — WEIGHT: 194.3 LBS | BODY MASS INDEX: 33.17 KG/M2 | HEIGHT: 64 IN

## 2021-06-02 VITALS — BODY MASS INDEX: 34.77 KG/M2 | WEIGHT: 199.4 LBS

## 2021-06-02 DIAGNOSIS — N81.11 CYSTOCELE, MIDLINE: ICD-10-CM

## 2021-06-02 DIAGNOSIS — R39.11 URINARY HESITANCY: ICD-10-CM

## 2021-06-02 DIAGNOSIS — N95.2 ATROPHIC VAGINITIS: ICD-10-CM

## 2021-06-02 DIAGNOSIS — R39.14 FEELING OF INCOMPLETE BLADDER EMPTYING: ICD-10-CM

## 2021-06-02 DIAGNOSIS — N81.11 MIDLINE CYSTOCELE: ICD-10-CM

## 2021-06-02 DIAGNOSIS — N39.8 VOIDING DYSFUNCTION: ICD-10-CM

## 2021-06-02 DIAGNOSIS — N81.6 RECTOCELE: ICD-10-CM

## 2021-06-02 DIAGNOSIS — R39.15 URINARY URGENCY: Primary | ICD-10-CM

## 2021-06-02 DIAGNOSIS — N39.41 URGE INCONTINENCE: ICD-10-CM

## 2021-06-02 PROCEDURE — 99204 OFFICE O/P NEW MOD 45 MIN: CPT | Mod: 95 | Performed by: UROLOGY

## 2021-06-02 ASSESSMENT — PATIENT HEALTH QUESTIONNAIRE - PHQ9: SUM OF ALL RESPONSES TO PHQ QUESTIONS 1-9: 16

## 2021-06-02 NOTE — PROGRESS NOTES
Sushila is a 60 year old who is being evaluated via a billable video visit.      How would you like to obtain your AVS? MyChart  If the video visit is dropped, the invitation should be resent by: Send to e-mail at: [email or web address]  Will anyone else be joining your video visit? No      Video Start Time: 11:34 AM  Video-Visit Details    Type of service:  Video Visit    Video End Time:1:50 PM    Originating Location (pt. Location): Home    Distant Location (provider location):  [facility] [place]     Platform used for Video Visit: [employer]

## 2021-06-02 NOTE — PROGRESS NOTES
HPI:  Sushila Flores is a 60 year old female being seen for urinary urgency and frequency as well as urinary hesitancy as well as a vaginal bulge.  This has been going on for a while but the prolapse was recently noticed.  She also complains of straining to void.  Menopause was around age 52, she was never on any hormone replacement.  She feels like she doesn't empty her bladder.    She denies any recurrent uti's.    Reviewed previous notes from Dr. Edward on 5/20/21    Exam:  There were no vitals taken for this visit.  GENERAL: Healthy, alert and no distress  EYES: Eyes grossly normal to inspection.  No discharge or erythema, or obvious scleral/conjunctival abnormalities.  RESP: No audible wheeze, cough, or visible cyanosis.  No visible retractions or increased work of breathing.    SKIN: Visible skin clear. No significant rash, abnormal pigmentation or lesions.  NEURO: Cranial nerves grossly intact.  Mentation and speech appropriate for age.  PSYCH: Mentation appears normal, affect normal/bright, judgement and insight intact, normal speech and appearance well-groomed.      Review of Labs:  The following labs were reviewed by me and discussed with the patient:  Labs from 5/20/21  UA with trace blood but micro is negative  Wet prep neg  Pap negative.    Assessment & Plan     61 y/o female with urinary urgency, frequency, hesitancy, and feeling of incomplete emptying.  She also has some pelvic organ prolapse and atrophic vaginitis in the setting of borderline type II DM, and back problems.  We discussed different management options however she has tried medication in the past but they did not work.  We discussed further testing and she agrees with this plan.  -schedule UDS with and without packing of her prolapse.  -f/u to review results and undergo cystoscopy  -will discuss management options based on results and consider estrogen cream.      Cory Mancia MD  [facility]  [place]      ==========================      Additional Coding Information:    Time spent:  48 minutes spent on the date of the encounter doing chart review, history and exam, documentation and further activities per the note

## 2021-06-02 NOTE — PATIENT INSTRUCTIONS
-schedule UDS with and without packing of her prolapse.  -f/u to review results and undergo cystoscopy  -will discuss management options based on results and consider estrogen cream.

## 2021-06-02 NOTE — LETTER
6/2/2021       RE: Sushila Flores  [address]  [place] [address]     Dear Colleague,    Thank you for referring your patient, Sushila Flores, to the [facility] [place] [facility]. Please see a copy of my visit note below.    Sushila is a 60 year old who is being evaluated via a billable video visit.      How would you like to obtain your AVS? MyChart  If the video visit is dropped, the invitation should be resent by: Send to e-mail at: [email or web address]  Will anyone else be joining your video visit? No      Video Start Time: 11:34 AM  Video-Visit Details    Type of service:  Video Visit    Video End Time:1:50 PM    Originating Location (pt. Location): Home    Distant Location (provider location):  [facility] [place]     Platform used for Video Visit: [employer]    HPI:  Sushila Flores is a 60 year old female being seen for urinary urgency and frequency as well as urinary hesitancy as well as a vaginal bulge.  This has been going on for a while but the prolapse was recently noticed.  She also complains of straining to void.  Menopause was around age 52, she was never on any hormone replacement.  She feels like she doesn't empty her bladder.    She denies any recurrent uti's.    Reviewed previous notes from Dr. Edward on 5/20/21    Exam:  There were no vitals taken for this visit.  GENERAL: Healthy, alert and no distress  EYES: Eyes grossly normal to inspection.  No discharge or erythema, or obvious scleral/conjunctival abnormalities.  RESP: No audible wheeze, cough, or visible cyanosis.  No visible retractions or increased work of breathing.    SKIN: Visible skin clear. No significant rash, abnormal pigmentation or lesions.  NEURO: Cranial nerves grossly intact.  Mentation and speech appropriate for age.  PSYCH: Mentation appears normal, affect normal/bright, judgement and insight intact, normal speech and  appearance well-groomed.      Review of Labs:  The following labs were reviewed by me and discussed with the patient:  Labs from 5/20/21  UA with trace blood but micro is negative  Wet prep neg  Pap negative.    Assessment & Plan     61 y/o female with urinary urgency, frequency, hesitancy, and feeling of incomplete emptying.  She also has some pelvic organ prolapse and atrophic vaginitis in the setting of borderline type II DM, and back problems.  We discussed different management options however she has tried medication in the past but they did not work.  We discussed further testing and she agrees with this plan.  -schedule UDS with and without packing of her prolapse.  -f/u to review results and undergo cystoscopy  -will discuss management options based on results and consider estrogen cream.      Cory Mancia MD  [facility] [place]      ==========================      Additional Coding Information:    Time spent:  48 minutes spent on the date of the encounter doing chart review, history and exam, documentation and further activities per the note

## 2021-06-03 ENCOUNTER — TELEPHONE (OUTPATIENT)
Dept: UROLOGY | Facility: CLINIC | Age: 61
End: 2021-06-03

## 2021-06-03 VITALS — BODY MASS INDEX: 35.91 KG/M2 | WEIGHT: 206.5 LBS

## 2021-06-03 NOTE — TELEPHONE ENCOUNTER
LVM for patient to schedule UDS with and without packing of her prolapse.  -f/u to review results and undergo cystoscopy

## 2021-06-03 NOTE — PROGRESS NOTES
Assessment:     Diagnoses and all orders for this visit:    Myofascial pain  -     Ambulatory referral to Physical Therapy    Spinal stenosis of lumbar region with neurogenic claudication  -     Ambulatory referral to Physical Therapy    Chronic pain syndrome  -     Ambulatory referral to Physical Therapy    Fibromyalgia  -     Ambulatory referral to Physical Therapy  -     DULoxetine (CYMBALTA) 60 MG capsule; Take 1 capsule (60 mg total) by mouth daily.  Dispense: 30 capsule; Refill: 5    Cervicalgia  -     Ambulatory referral to Physical Therapy       Sushila Flores is a 58 y.o. y.o. female with past medical history significant for bilateral carpal tunnel syndrome, obesity, chronic back pain, osteoarthritis of multiple joints, sciatic nerve pain, cervical spondylosis without myelopathy, type 2 diabetes, vitamin D deficiency who presents today for follow-up regarding low back pain and left greater than right lower limb pain:    -Overall patient's physical exam is reassuring that she has normal strength.  Similar pattern as last visit including low back and left greater than right lower limb pain.  She does have lumbar spinal stenosis at the level of L4-5 which is a possible contributor to her pain.  Duloxetine is helpful for her myofascial type pain.     Plan:     A shared decision making plan was used. The patient's values and choices were respected. Prior medical records from our last visit on 4/19/2019  were reviewed today. The following represents what was discussed and decided upon by the provider and the patient.        -DIAGNOSTIC TESTS: Images were personally reviewed and interpreted.   --MRI of the lumbar spine dated 12/6/2018 is personally reviewed and images interpreted and shown to the patient.  It does show mild L4-5 spondylolisthesis of L4-5 with severe right and moderate left facet joint arthritis and ligamentum flavum thickening.  There is moderate spinal canal stenosis at L4-5 as well as likely  impingement of the bilateral L5 nerve roots in the lateral recess.  At L5-S1 there is also a 2 mm spondylolisthesis with severe left greater than right facet arthropathy.  Severe left foraminal stenosis.  --MRI of the lumbar spine dated 12/29/2015 report is reviewed.  Does show multilevel lower thoracic and lumbar disc and facet degeneration.  There is a 2 mm spondylolisthesis at L5-S1 with moderate lateral recess stenosis.  There is moderate foraminal stenosis at this level as well.  There is also a 2 mm spondylolisthesis of L4 on L5 with mild central canal stenosis and mild foraminal stenosis on the right.   --Labs dated 3/4/2019: CRP and sed rate are in normal range.    -INTERVENTIONS: No interventions at this time.  Could consider left L4-5 transfemoral epidural steroid injection in the future should she continue to have pain despite conservative management.    -MEDICATIONS: I have refilled her Cymbalta 60 mg.  -  Discussed side effects of medications and proper use. Patient verbalized understanding.    -PHYSICAL THERAPY: She is had 2 sessions of physical therapy.  This was cut short secondary to shingles prior to me seeing her last.  I have reordered physical therapy and like her to have a home-going exercise program that she can do on a daily basis.  Discussed the importance of core strengthening, ROM, stretching exercises with the patient and how each of these entities is important in decreasing pain.  Explained to the patient that the purpose of physical therapy is to teach the patient a home exercise program.  These exercises need to be performed every day in order to decrease pain and prevent future occurrences of pain.        -PATIENT EDUCATION: We discussed pain management in a multimodal fashion including physical therapy, medication management, possible future injections.    -FOLLOW UP: Patient will follow-up in 4 weeks.  Advised to contact clinic if symptoms worsen or change.    Subjective:     Sushila BATRES  Mark is a 58 y.o. female who presents today for follow-up regarding low back and left lower limb pain.  Patient reports that pain has been worsening over the last few weeks and is having new symptoms of left groin and anterior thigh pain.  She also has pain going down the posterior lateral buttock, thigh and calf and also the pain going into the anterior shin and foot on the left side.  She also notes left mid thoracic and shoulder pain.  The pain in her leg is achy and at times shooting in nature.  Her pain today is 7/10 as worst as 10/10 as best as 5/10.  Pain is worse with prolonged walking, standing, sitting as well as laying on her left side.  Pain is improved somewhat when she lays on her stomach with pillows underneath her legs.  She is currently taking Aleve and Eloxatin.  Duloxetine is helping for the sensitivity of overall pain, however is not helping back and leg pain.  She has tried gabapentin, however she had bad side effects with this.  With Celebrex she was having stomach problems is no longer taking this.    -Treatment to Date: Left L4-5 transforaminal epidural steroid injection in the past.  MRI of the lumbar spine dated 12/6/2018.  MRI of the lumbar spine dated 12/29/2015.  Physical therapy in the past.    2 sessions of physical therapy last being on 3/22/2019.    Patient Active Problem List   Diagnosis     Bilateral carpal tunnel syndrome     BMI 36.0-36.9,adult     Chronic back pain     Osteoarthritis of multiple joints     Positive FIT (fecal immunochemical test)     Sciatic nerve pain     Spondylosis of cervical region without myelopathy or radiculopathy     Type 2 diabetes mellitus without complication (H)     Vitamin D deficiency       Current Outpatient Medications on File Prior to Encounter   Medication Sig Dispense Refill     atorvastatin (LIPITOR) 80 MG tablet Take 80 mg by mouth.       cetirizine (ZYRTEC) 5 MG tablet Take 10 mg by mouth.       famotidine (FOR PEPCID) 10 MG tablet Take  10 mg by mouth.       metFORMIN (GLUCOPHAGE-XR) 500 MG 24 hr tablet Take 500 mg by mouth.       polyethylene glycol (MIRALAX) 17 gram/dose powder Take by mouth.       [DISCONTINUED] DULoxetine (CYMBALTA) 20 MG capsule Take 3 capsules (60 mg total) by mouth daily. 90 capsule 0     celecoxib (CELEBREX) 200 MG capsule Take 1 capsule (200 mg total) by mouth 2 (two) times a day. 60 capsule 0     cyclobenzaprine (FLEXERIL) 10 MG tablet Take 1 tablet (10 mg total) by mouth 3 (three) times a day as needed for muscle spasms. 60 tablet 0     ibuprofen (ADVIL,MOTRIN) 800 MG tablet Take 800 mg by mouth every 6 (six) hours as needed for pain.       lidocaine 4 % patch Place 1 patch on the skin.       valACYclovir (VALTREX) 1000 MG tablet   0     No current facility-administered medications on file prior to encounter.        Allergies   Allergen Reactions     Penicillins Unknown, Nausea And Vomiting and Swelling     lips  lips       Hydrocodone-Acetaminophen Rash     Oxycodone-Acetaminophen Rash       No past medical history on file.     Review of Systems  ROS:  Specifically negative for bowel/bladder dysfunction, balance changes, headache, dizziness, foot drop, fevers, chills, appetite changes, nausea/vomiting, unexplained weight loss. Otherwise 13 systems reviewed are negative. Please see the patient's intake questionnaire from today for details.    Reviewed Social, Family, Past Medical and Past Surgical history with patient, no significant changes noted since prior visit.     Objective:     /66 (Patient Site: Right Arm, Patient Position: Sitting, Cuff Size: Adult Large)   Pulse 79   Temp 98.6  F (37  C) (Oral)   Resp 18   Wt 206 lb 8 oz (93.7 kg)   SpO2 100%   BMI 36.01 kg/m      PHYSICAL EXAMINATION:    --CONSTITUTIONAL: Well developed, well nourished, healthy appearing individual.  --PSYCHIATRIC: Appropriate mood and affect. No difficulty interacting due to temper, social withdrawal, or memory issues.  --SKIN:  Lumbar region is dry and intact.   --RESPIRATORY: Normal rhythm and effort. No abnormal accessory muscle breathing patterns noted.   --MUSCULOSKELETAL:  Normal lumbar lordosis noted, no lateral shift.  --GROSS MOTOR: Easily arises from a seated position. Gait is non-antalgic  --LUMBAR SPINE:  Inspection reveals no evidence of deformity. Range of motion is not limited in lumbar flexion, extension, lateral rotation.  She has tenderness to palpation along her low left lumbar paraspinal muscles and over the PSIS area.  Facet loading is positive.  Straight leg raising in the supine position is negative to radicular pain. Sciatic notch non-tender.   --SACROILIAC JOINT: Negative distraction.  Negative Victoria's with reproduction of pain to affected extremity. One Finger point test negative.  --LOWER EXTREMITY MOTOR TESTING:  Plantar flexion left 5/5, right 5/5   Dorsiflexion left 5/5, right 5/5   Great toe MTP extension left 5/5, right 5/5  Knee flexion left 5/5, right 5/5  Knee extension left 5/5, right 5/5   Hip flexion left 5/5, right 5/5  Hip abduction left 5/5, right 5/5  Hip adduction left 5/5, right 5/5   --HIPS: Full range of motion bilaterally.  Central test is negative bilaterally.  --NEUROLOGIC: Bilateral patellar and achilles reflexes are physiologic and symmetric. Sensation to light touch is intact in the bilateral L4, L5, and S1 dermatomes.    RESULTS:   Imaging: Lumbar spine imaging was reviewed today. The images were shown to the patient and the findings were explained using a spine model.

## 2021-06-03 NOTE — PATIENT INSTRUCTIONS - HE
Discussed the importance of core strengthening, ROM, stretching exercises with the patient and how each of these entities is important in decreasing pain.  Explained to the patient that the purpose of physical therapy is to teach the patient a home exercise program.  These exercises need to be performed every day in order to decrease pain and prevent future occurrences of pain.        I have refilled your Cymbalta(duloxetine).    ~Please call Nurse Navigation line [phone] with any questions or concerns about your treatment plan, if symptoms worsen and you would like to be seen urgently, or if you have problems controlling bladder and bowel function.

## 2021-06-17 NOTE — PATIENT INSTRUCTIONS - HE
Patient Instructions by Madelin Ojeda PT at 3/22/2019  1:30 PM     Author: Madelin Ojeda PT Service: -- Author Type: Physical Therapist    Filed: 3/22/2019  1:45 PM Encounter Date: 3/22/2019 Status: Signed    : Madelin Ojeda PT (Physical Therapist)        BRACE MARCHING    While lying on your back with your knees bent,  slowly raise up one foot a few inches and then set it back down.  Next, perform on your other leg.  Use your stomach muscles to keep your spine from moving.    x10-15 repetitions  1-2 sets      SUPINE HIP ABDUCTION - ELASTIC BAND CLAMS    Lie down on your back with your knees bent. Place an elastic band around your knees and then draw your knees apart.    x10-15 repetitions  1-2 sets  Count to 5 out and count to 5 in  Can build up to 15-20 repetitions

## 2021-06-17 NOTE — PATIENT INSTRUCTIONS - HE
"Patient Instructions by Madelin Ojeda PT at 3/15/2019 12:30 PM     Author: Madelin Ojeda PT Service: -- Author Type: Physical Therapist    Filed: 3/15/2019  1:28 PM Encounter Date: 3/15/2019 Status: Signed    : Madelin Ojeda PT (Physical Therapist)        ABDOMINAL BRACING    While lying on your back, tighten your stomach muscles as you draw your navel down towards the floor.    Hold 5 seconds  x10-15 repetitions  1-2 sets      PELVIC TILT    While lying on your back, use your stomach muscles to press your spine downwards towards the ground. Do not move into a painful position.    Hold 5 seconds  x10-15 repetitions  1-2 sets      BRIDGING    While lying on your back, tighten your lower abdominals, squeeze your buttocks, and squeeze a pillow between your knees, and then raise your buttocks off the floor/bed as creating a \"Bridge\" with your body.    Hold 5 seconds  x10-15 repetitions  1-2 sets       Pelvic correction: pillows between your knee and squeeze for 10 seconds           "

## 2021-06-24 NOTE — TELEPHONE ENCOUNTER
----- Message from Herson Buck, DO sent at 3/4/2019  2:27 PM CST -----  Please call the patient let her know that I reviewed labs of her CRP and sed rate?  They are both normal.  I recommend that we continue with physical therapy and follow-up as scheduled.

## 2021-06-24 NOTE — TELEPHONE ENCOUNTER
Pt returned call. Provider's results and recommendations given. Pt verbalized understanding. Pt has been out of town and will call to schedule PT. Once she has had some PT sessions, then she will follow-up with Dr. Buck.

## 2021-07-08 ENCOUNTER — PRE VISIT (OUTPATIENT)
Dept: UROLOGY | Facility: CLINIC | Age: 61
End: 2021-07-08

## 2021-07-19 ENCOUNTER — ANCILLARY PROCEDURE (OUTPATIENT)
Dept: RADIOLOGY | Facility: AMBULATORY SURGERY CENTER | Age: 61
End: 2021-07-19
Attending: NURSE PRACTITIONER
Payer: MEDICARE

## 2021-07-19 ENCOUNTER — ALLIED HEALTH/NURSE VISIT (OUTPATIENT)
Dept: UROLOGY | Facility: CLINIC | Age: 61
End: 2021-07-19
Payer: MEDICARE

## 2021-07-19 VITALS
SYSTOLIC BLOOD PRESSURE: 139 MMHG | DIASTOLIC BLOOD PRESSURE: 96 MMHG | HEART RATE: 96 BPM | WEIGHT: 204 LBS | BODY MASS INDEX: 36.14 KG/M2 | HEIGHT: 63 IN

## 2021-07-19 DIAGNOSIS — R39.15 URINARY URGENCY: Primary | ICD-10-CM

## 2021-07-19 DIAGNOSIS — N39.41 URGE INCONTINENCE: ICD-10-CM

## 2021-07-19 DIAGNOSIS — R39.15 URINARY URGENCY: ICD-10-CM

## 2021-07-19 DIAGNOSIS — R39.11 URINARY HESITANCY: ICD-10-CM

## 2021-07-19 PROCEDURE — 51600 INJECTION FOR BLADDER X-RAY: CPT | Performed by: NURSE PRACTITIONER

## 2021-07-19 PROCEDURE — 74455 X-RAY URETHRA/BLADDER: CPT | Performed by: NURSE PRACTITIONER

## 2021-07-19 PROCEDURE — 51741 ELECTRO-UROFLOWMETRY FIRST: CPT | Performed by: NURSE PRACTITIONER

## 2021-07-19 PROCEDURE — 51784 ANAL/URINARY MUSCLE STUDY: CPT | Performed by: NURSE PRACTITIONER

## 2021-07-19 PROCEDURE — 99207 PR NO CHARGE INJECTABLE MED/DRUG: CPT | Performed by: NURSE PRACTITIONER

## 2021-07-19 PROCEDURE — 81003 URINALYSIS AUTO W/O SCOPE: CPT | Performed by: NURSE PRACTITIONER

## 2021-07-19 PROCEDURE — 51797 INTRAABDOMINAL PRESSURE TEST: CPT | Performed by: NURSE PRACTITIONER

## 2021-07-19 PROCEDURE — 51728 CYSTOMETROGRAM W/VP: CPT | Performed by: NURSE PRACTITIONER

## 2021-07-19 RX ORDER — SULFAMETHOXAZOLE/TRIMETHOPRIM 800-160 MG
1 TABLET ORAL ONCE
Status: COMPLETED | OUTPATIENT
Start: 2021-07-19 | End: 2021-07-19

## 2021-07-19 RX ADMIN — SULFAMETHOXAZOLE AND TRIMETHOPRIM 1 TABLET: 800; 160 TABLET ORAL at 16:27

## 2021-07-19 ASSESSMENT — PAIN SCALES - GENERAL: PAINLEVEL: NO PAIN (0)

## 2021-07-19 ASSESSMENT — MIFFLIN-ST. JEOR: SCORE: 1474.47

## 2021-07-19 NOTE — PROGRESS NOTES
PREPROCEDURE DIAGNOSES:    1. Urinary urgency, frequency, hesitancy, and feeling of incomplete emptying  2. Pelvic organ prolapse    POSTPROCEDURE DIAGNOSES:  -Small bladder capacity (filled to max of 126 mL during fill 1, and only to 55 mL with fill 2)   -Heightened filling sensations  -Bladder compliance appears good during fill 1, but poor during fill 2.   -No stress maneuvers attempted today due to brief nature of her two studies.  -With vaginal packing in place, she makes long attempt to void entirely through Valsalva effort, and reports discomfort in her lower pelvis, consistent with symptoms she has been experiencing at home. With staff out of the room, she is then able to produce a brief detrusor contraction and empty her bladder completely (please see detailed description above).   -With vaginal packing removed, she is only able to hold 55 mL in her bladder, with a slight rise in detrusor pressure during feeling, reaching ~10 cm H20 at capacity. She is then able to promptly produce a detrusor contraction, reaching 31 cm H20.   -EMG increased throughout second fill, likely due to contamination during first void.   -BOOI calculated during first void 4.7, negative  for bladder outlet obstruction.  -Please see in-text description of both studies for further details.     PROCEDURE:    -Uroflowmetry.  -Sterile urethral catheterization for measurement of postvoid residual urine volume.  -Complex filling cystometrogram with measurement of bladder and rectal pressures.  -Complex voiding cystometrogram with measurement of bladder and rectal pressures.  -Electromyography of the pelvic floor during urodynamics.  -Interpretation of urodynamics     INDICATIONS FOR PROCEDURE:  Ms. Sushila Flores is a pleasant 60 year old female with urinary urgency, frequency, hesitancy, and feeling of incomplete emptying and elvic organ prolapse. Baseline urodynamic assessment is requested today by Dr. Mancia to better characterize MsDeclan  Sushila Flores's voiding dysfunction.      VOIDING DIARY:  Not completed.    DESCRIPTION OF PROCEDURE:  Risks, benefits, and alternatives to urodynamics were discussed with the patient and she wished to proceed.  Urodynamics are planned to better assess the primary etiology for Ms. Flores's urologic dysfunction. After informed consent was obtained, the patient was taken to the procedure room where the study was initiated. Findings below.     PRE-STUDY UROFLOWMETRY:  Voided volume: 155 mL.  Maximum flow rate: 9.2 mL/sec.  Average flow rate: 2.7 mL/sec.  Character of the curve: Intermittent.  Postvoid residual by catheter: 300 mL.  Pretest urine dipstick was negative for leukocytes and nitrites.    Next a 7F double-lumen urodynamics catheter was inserted into the bladder under sterile technique via the urethra.  A 7F abdominal manometry catheter was placed in the rectum.  EMG pads were placed on both sides of the anal verge.  The bladder was filled with 200 mL of Omnipaque at 30 mL/minute and serial pressures were recorded.  With coughing there was an appropriate rise in vesical and abdominal pressures with no change in detrusor pressure, confirming good study catheter placement.    DURING THE FILLING PHASE:  Study conducted twice today with and without vaginal packing. Results described below:     Study 1: With vaginal packing    (Please note that below volumes were calculated based on voided volume and do not reflect accurately on [facility] printed report.)     First sensation: 83 mL.  First Desire: 95 mL.  Strong Desire: 101 mL.  Maximum Capacity: 126 mL.    Uninhibited detrusor contractions: None.  Compliance: Good- PDet essentially 0 cmH20 at capacity.  Continence: No DOI. No stress maneuvers attempted due to short fill time.   EMG: Some increased activity, which correlates with patient Valsalva/movement during filling.    DURING THE VOIDING PHASE:  The patient attempts to void while seated on the [employer] chair, but  only Valsalva effort is noted. She then reports discomfort in her lower pelvis, and notes that this is consistent with what she experiences at home when she is unable to void. She is then assisted to a commode for her second attempt to void and continues to Valsalva, becoming very frustrated. At that time, [facility] staff exited the room to allow her privacy, during which time she was able to intermittently void. She initially appears to void a small amount (19 mL) via Valsalva, but is then able to produce a brief detrusor contraction, reaching 37 cm H20 and empties the remainder of her bladder contents. Data as follows:   Voided volume: 126 mL.  Maximum flow rate: 6.8 mL/sec.  Average flow rate: 3.2 mL/sec.  Postvoid Residual: 0 mL.  EMG activity: Increased due to Valsalva.  Character of voiding curve: Intermittent.  BOOI: 4.7 (suggesting no obstruction - see key below)  [obstructed (ANTHONY index [BOOI] ? 40); equivocal (no definite   obstruction; BOOI 20-40); and no obstruction (BOOI ? 20)]    Study 2: Without vaginal packing    Patient preferred to remain on the commode during the second study rather than being transferred back to the [employer] chair. Therefore, fluoroscopy not utilized during fill 2.      First sensation: 27 mL.  First Desire: 42 mL.  Strong Desire: 52 mL.  Maximum Capacity: 55 mL.    Uninhibited detrusor contractions: None.  Compliance: Poor. PDet=appears to quickly trend up after filling initiated, reaching ~10 cmH20 at capacity. Compliance ratio of 5.5.  Continence: No DOI. No stress maneuvers attempted due to short fill time  EMG: Active throughout second fill/void, likely representing contamination during first void.     DURING THE VOIDING PHASE:  Maximum detrusor contraction with void: 31 cm of H2O pressure. She appears to again supplement a bit with Valsalva.   Voided volume: 55 mL.  Maximum flow rate: ~5 mL/sec.  Postvoid Residual: 0 mL.  EMG activity: Increased, as noted above.  Character of  voiding curve: Intermittent.      FLUOROSCOPIC IMAGING OF THE BLADDER DURING URODYNAMICS:  Please note, due to the brief initial fill, followed by transfer to [facility] for voiding #1 and fill #2, only one image obtained of the bladder. Therefore, fluoroscopy portion omitted and not interpreted.     ASSESSMENT/PLAN:  Ms. Sushila Flores is a pleasant 60 year old female with urinary urgency, frequency, hesitancy, and feeling of incomplete emptying and pelvic organ prolapse who demonstrated the following findings today on urodynamic evaluation:    -Small bladder capacity (filled to max of 126 mL during fill 1, and only to 55 mL with fill 2)   -Heightened filling sensations  -Bladder compliance appears good during fill 1, but poor during fill 2.   -No stress maneuvers attempted today due to brief nature of her two studies.  -With vaginal packing in place, she makes long attempt to void entirely through Valsalva effort, and reports discomfort in her lower pelvis, consistent with symptoms she has been experiencing at home. With staff out of the room, she is then able to produce a brief detrusor contraction and empty her bladder completely (please see detailed description above).   -With vaginal packing removed, she is only able to hold 55 mL in her bladder, with a slight rise in detrusor pressure during feeling, reaching ~10 cm H20 at capacity. She is then able to promptly produce a detrusor contraction, reaching 31 cm H20.   -EMG increased throughout second fill, likely due to contamination during first void.   -BOOI calculated during first void 4.7, negative  for bladder outlet obstruction.  -Please see in-text description of both studies for further details.       The patient will follow up as scheduled with Dr. Mancia for cystoscopy and to further discuss today's study results and make plans for how best to proceed.      - A single Bactrim DS was provided for UTI prophylaxis following completion of today's study per  department protocol.  The risk of UTI with VUDS is low at ~2.5-3%.      Thank you for allowing me to participate in the care of Ms. Sushila Flores and please don't hesitate to contact me with any questions or concerns.      This procedure was performed under a collaborative agreement with Dr. Rodolfo Diaz, Professor and  of Urology, [facility].    STEPHANY Rayo, CNP  Department of Urology

## 2021-07-19 NOTE — NURSING NOTE
"  Chief Complaint   Patient presents with     Urodynamics Study       Blood pressure (!) 139/96, pulse 96, height 1.6 m (5' 3\"), weight 92.5 kg (204 lb). Body mass index is 36.14 kg/m .    Patient Active Problem List   Diagnosis     Sciatic nerve pain     Vitamin D deficiency     Chronic back pain     Bilateral carpal tunnel syndrome     Osteoarthritis of multiple joints     Pap smear for cervical cancer screening     Spondylosis of cervical region without myelopathy or radiculopathy     BMI 36.0-36.9,adult     Candidate for statin therapy due to risk of future cardiovascular event     Type 2 diabetes mellitus without complication (H)     Positive FIT (fecal immunochemical test)     Special screening for malignant neoplasms, colon     Morbid obesity (H)     Cystocele, midline     Voiding dysfunction     Urinary urgency     Urge incontinence     Rectocele     Atrophic vaginitis     Urinary hesitancy     Feeling of incomplete bladder emptying       Allergies   Allergen Reactions     Amoxicillin Swelling     Penicillin G Nausea and Vomiting     Percocet [Oxycodone-Acetaminophen]      Vicodin [Hydrocodone-Acetaminophen]      Metformin Itching     All over itching       Current Outpatient Medications   Medication Sig Dispense Refill     atorvastatin (LIPITOR) 80 MG tablet Take 1 tablet (80 mg) by mouth daily (Patient not taking: Reported on 7/19/2021) 90 tablet 1     bacitracin 500 UNIT/GM external ointment Apply topically 2 times daily To groin and hawk areas of folliculitis until resolved (Patient not taking: Reported on 7/19/2021) 28 g 4     Blood Gluc Meter Disp-Strips (BLOOD GLUCOSE METER DISPOSABLE) MARCIANO Dispense per preferred. Test sugars as needed up to 1 per day. Dx250.0 Strips and lancets #90 (Patient not taking: Reported on 7/19/2021) 1 each 0     blood glucose monitoring (SHAGUFTA MICROLET) lancets Use to test blood sugar 1 times daily or as directed. (Patient not taking: Reported on 7/19/2021) 100 each prn     " blood glucose monitoring (NO BRAND SPECIFIED) test strip Use to test blood sugars 1-2 times daily or as directed (Patient not taking: Reported on 2021) 100 strip prn     cetirizine (ZYRTEC) 10 MG tablet Take 1 tablet (10 mg) by mouth daily (Patient not taking: Reported on 2021) 60 tablet 3     cyclobenzaprine (FLEXERIL) 10 MG tablet Take 1 tablet (10 mg) by mouth 3 times daily as needed for muscle spasms (Patient not taking: Reported on 2021) 42 tablet 1     diphenhydrAMINE (BENADRYL) 25 MG tablet Take 1 tablet (25 mg) by mouth every 6 hours as needed for itching or allergies (Patient not taking: Reported on 2021) 30 tablet 1     DULoxetine (CYMBALTA) 20 MG capsule Take 60 mg by mouth daily (Patient not taking: Reported on 2021)       famotidine (PEPCID) 10 MG tablet Take 1 tablet (10 mg) by mouth 2 times daily (Patient not taking: Reported on 2021) 60 tablet 11     lisinopril (PRINIVIL/ZESTRIL) 10 MG tablet Take 0.5 tablets (5 mg) by mouth daily (Patient not taking: Reported on 2020) 30 tablet 3     order for DME Electronic blood pressure cuff - check blood pressure 2-3 times weekly (Patient not taking: Reported on 2021) 1 Device 0     triamcinolone (KENALOG) 0.1 % external cream Apply topically 2 times daily (Patient not taking: Reported on 2021) 45 g 3       Social History     Tobacco Use     Smoking status: Former Smoker     Types: Cigarettes     Quit date: 2018     Years since quittin.6     Smokeless tobacco: Never Used   Substance Use Topics     Alcohol use: Yes     Alcohol/week: 0.0 standard drinks     Comment: ocassionally     Drug use: Yes     Types: Marijuana       Invasive Procedure Safety Checklist:    Procedure: Urodynamics    Action: Complete sections and checkboxes as appropriate.  Pre-procedure:  1. Patient ID Verified with 2 identifiers (Jenifer and  or MRN) : YES    2. Procedure and site verified with patient/designee (when able) : YES    3.  Accurate consent documentation in medical record : YES    4. H&P (or appropriate assessment) documented in medical record : N/A  H&P must be up to 30 days prior to procedure an updated within 24 hours of Procedure as applicable.     5. Relevant diagnostic and radiology test results appropriately labeled and displayed as applicable : YES    6. Blood products, implants, devices, and/or special equipment available for the procedure as applicable : YES    7. Procedure site(s) marked with provider initials [Exclusions: none] : NO    8. Marking not required. Reason : Yes  Procedure does not require site marking    Time Out:     Time-Out performed immediately prior to starting procedure, including verbal and active participation of all team members addressing: YES    1. Correct patient identity.  2. Confirmed that the correct side and site are marked.  3. An accurate procedure to be done.  4. Agreement on the procedure to be done.  5. Correct patient position.  6. Relevant images and results are properly labeled and appropriately displayed.  7. The need to administer antibiotics or fluids for irrigation purposes during the procedure as applicable.  8. Safety precautions based on patient history or medication use.    During Procedure: Verification of correct person, site, and procedure occurs any time the responsibility for care of the patient is transferred to another member of the care team.    The following medication was given: Sulfamethoxazole / Trimethoprim    MEDICATION:  Bactrim  ROUTE: PO  SITE: Orally  DOSE: 800/160mg  LOT #: [id number]  : [employer]  EXPIRATION DATE: 08/22  NDC#: 0298-5264-77   Was there drug waste? No    Prior to administration, verified patient identity using patient's name and date of birth.  Due to administration, patient instructed to remain in clinic for 15 minutes  afterwards, and to report any adverse reaction to me immediately.    Drug Amount Wasted:  None.  Vial/Syringe: Single  dose vial      The following medication was given:     MEDICATION:  Omnipaque (Iohexol Injection) (240mgI/mL)  ROUTE: Provider Administered  SITE: Provider Administered via catheter  DOSE: 200mL  LOT #: [id number]  : [employer]  EXPIRATION DATE: 08/08/2023  NDC#: [address]-72   Was there drug waste? No    Prior to injection, verified patient identity using patient's name and date of birth.  Due to injection administration, patient instructed to remain in clinic for 15 minutes  afterwards, and to report any adverse reaction to me immediately.    Drug Amount Wasted:  None.  Vial/Syringe: Single dose vial    Sushila Flores comes into clinic today at the request of Dr. Shikha Mancia for Urodynamics.    Order has been verified: Yes.    Patient did tolerate procedure well.    Patient instructed as to where to call or go for pain, fever, leakage, or decreased urine flow.     This service provided today was under the direct supervision of Lexis Yang CNP who was available if needed.    Franco Rogers EMT  7/19/2021  4:23 PM

## 2021-07-21 LAB
ALBUMIN UR-MCNC: NEGATIVE MG/DL
APPEARANCE UR: CLEAR
BILIRUB UR QL STRIP: NEGATIVE
COLOR UR AUTO: YELLOW
GLUCOSE UR STRIP-MCNC: NEGATIVE MG/DL
HGB UR QL STRIP: NEGATIVE
KETONES UR STRIP-MCNC: NEGATIVE MG/DL
LEUKOCYTE ESTERASE UR QL STRIP: NEGATIVE
NITRATE UR QL: NEGATIVE
PH UR STRIP: 6 [PH] (ref 5–8)
SP GR UR STRIP: <1.005 (ref 1–1.03)
UROBILINOGEN UR STRIP-ACNC: 0.2 E.U./DL

## 2021-10-02 ENCOUNTER — HEALTH MAINTENANCE LETTER (OUTPATIENT)
Age: 61
End: 2021-10-02

## 2021-10-12 ENCOUNTER — OFFICE VISIT (OUTPATIENT)
Dept: FAMILY MEDICINE | Facility: CLINIC | Age: 61
End: 2021-10-12
Payer: MEDICARE

## 2021-10-12 VITALS
HEART RATE: 99 BPM | WEIGHT: 212 LBS | SYSTOLIC BLOOD PRESSURE: 128 MMHG | DIASTOLIC BLOOD PRESSURE: 82 MMHG | OXYGEN SATURATION: 98 % | HEIGHT: 63 IN | BODY MASS INDEX: 37.56 KG/M2 | RESPIRATION RATE: 22 BRPM | TEMPERATURE: 98.1 F

## 2021-10-12 DIAGNOSIS — Z23 NEED FOR PROPHYLACTIC VACCINATION AND INOCULATION AGAINST INFLUENZA: ICD-10-CM

## 2021-10-12 DIAGNOSIS — M79.601 BILATERAL ARM PAIN: Primary | ICD-10-CM

## 2021-10-12 DIAGNOSIS — M79.602 BILATERAL ARM PAIN: Primary | ICD-10-CM

## 2021-10-12 PROCEDURE — 90682 RIV4 VACC RECOMBINANT DNA IM: CPT | Performed by: STUDENT IN AN ORGANIZED HEALTH CARE EDUCATION/TRAINING PROGRAM

## 2021-10-12 PROCEDURE — 99214 OFFICE O/P EST MOD 30 MIN: CPT | Mod: 25 | Performed by: STUDENT IN AN ORGANIZED HEALTH CARE EDUCATION/TRAINING PROGRAM

## 2021-10-12 PROCEDURE — G0008 ADMIN INFLUENZA VIRUS VAC: HCPCS | Performed by: STUDENT IN AN ORGANIZED HEALTH CARE EDUCATION/TRAINING PROGRAM

## 2021-10-12 RX ORDER — DULOXETIN HYDROCHLORIDE 30 MG/1
CAPSULE, DELAYED RELEASE ORAL
Qty: 180 CAPSULE | Refills: 0 | Status: SHIPPED | OUTPATIENT
Start: 2021-10-12 | End: 2021-10-28

## 2021-10-12 RX ORDER — GABAPENTIN 100 MG/1
100 CAPSULE ORAL 3 TIMES DAILY
Qty: 90 CAPSULE | Refills: 3 | Status: SHIPPED | OUTPATIENT
Start: 2021-10-12 | End: 2021-10-12 | Stop reason: SINTOL

## 2021-10-12 ASSESSMENT — MIFFLIN-ST. JEOR: SCORE: 1504.37

## 2021-10-12 NOTE — PROGRESS NOTES
"     HPI:       Sushila Flores is a 60 year old  female with PMH significant for Chronic back pain, T2DM, osteoarthritis, who presents for:      1. Shoulder pain-bilateral  - \"Pinched nerve in back\", per MRI in 2015.   - Works from home, always on computer,   - Not getting sleep because of pain- cannont sleep on either shoulder.  - Pain described as sharp and shooting. Fingers numbness bilaterally, weak arms.  - Right arm worse than left. Weak, limited mobility.   - Stopped phsycial therapy with the shutdown of COVID.   - Had seen spine surgeon in the past, declined surgery at that time.   -Sleeping difficult, dressing herself is difficult, tough to work on computer.   -Taking ibuprofen and muscle relaxer daily.           PMHX:     Patient Active Problem List   Diagnosis     Sciatic nerve pain     Vitamin D deficiency     Chronic back pain     Bilateral carpal tunnel syndrome     Osteoarthritis of multiple joints     Pap smear for cervical cancer screening     Spondylosis of cervical region without myelopathy or radiculopathy     BMI 36.0-36.9,adult     Candidate for statin therapy due to risk of future cardiovascular event     Type 2 diabetes mellitus without complication (H)     Positive FIT (fecal immunochemical test)     Special screening for malignant neoplasms, colon     Morbid obesity (H)     Cystocele, midline     Voiding dysfunction     Urinary urgency     Urge incontinence     Rectocele     Atrophic vaginitis     Urinary hesitancy     Feeling of incomplete bladder emptying       Current Outpatient Medications   Medication Sig Dispense Refill     cyclobenzaprine (FLEXERIL) 10 MG tablet Take 1 tablet (10 mg) by mouth 3 times daily as needed for muscle spasms 42 tablet 1     atorvastatin (LIPITOR) 80 MG tablet Take 1 tablet (80 mg) by mouth daily (Patient not taking: Reported on 10/12/2021) 90 tablet 1     bacitracin 500 UNIT/GM external ointment Apply topically 2 times daily To groin and hawk areas of " folliculitis until resolved (Patient not taking: Reported on 7/19/2021) 28 g 4     Blood Gluc Meter Disp-Strips (BLOOD GLUCOSE METER DISPOSABLE) MARCIANO Dispense per preferred. Test sugars as needed up to 1 per day. Dx250.0 Strips and lancets #90 (Patient not taking: Reported on 7/19/2021) 1 each 0     blood glucose monitoring (SHAGUFTA MICROLET) lancets Use to test blood sugar 1 times daily or as directed. (Patient not taking: Reported on 7/19/2021) 100 each prn     blood glucose monitoring (NO BRAND SPECIFIED) test strip Use to test blood sugars 1-2 times daily or as directed (Patient not taking: Reported on 7/19/2021) 100 strip prn     cetirizine (ZYRTEC) 10 MG tablet Take 1 tablet (10 mg) by mouth daily (Patient not taking: Reported on 7/19/2021) 60 tablet 3     diphenhydrAMINE (BENADRYL) 25 MG tablet Take 1 tablet (25 mg) by mouth every 6 hours as needed for itching or allergies (Patient not taking: Reported on 7/19/2021) 30 tablet 1     DULoxetine (CYMBALTA) 20 MG capsule Take 60 mg by mouth daily (Patient not taking: Reported on 7/19/2021)       famotidine (PEPCID) 10 MG tablet Take 1 tablet (10 mg) by mouth 2 times daily (Patient not taking: Reported on 7/19/2021) 60 tablet 11     lisinopril (PRINIVIL/ZESTRIL) 10 MG tablet Take 0.5 tablets (5 mg) by mouth daily (Patient not taking: Reported on 8/6/2020) 30 tablet 3     order for [facility] [place] pressure cuff - check blood pressure 2-3 times weekly (Patient not taking: Reported on 7/19/2021) 1 Device 0     triamcinolone (KENALOG) 0.1 % external cream Apply topically 2 times daily (Patient not taking: Reported on 7/19/2021) 45 g 3       Social History: Reviewed    Family History: Reviewed       Allergies   Allergen Reactions     Amoxicillin Swelling     Penicillin G Nausea and Vomiting     Percocet [Oxycodone-Acetaminophen]      Vicodin [Hydrocodone-Acetaminophen]      Metformin Itching     All over itching       No results found for this or any previous visit  "(from the past 24 hour(s)).         Review of Systems:   10 point ROS negative except noted in above in HPI         Physical Exam:     Vitals:    10/12/21 1635   BP: 128/82   Pulse: 99   Resp: 22   Temp: 98.1  F (36.7  C)   SpO2: 98%   Weight: 96.2 kg (212 lb)   Height: 1.59 m (5' 2.6\")     Body mass index is 38.04 kg/m .    GENERAL APPEARANCE: healthy, alert and no distress,  NECK: no adenopathy, no asymmetry, masses, or scars and thyroid normal to palpation  RESP: lungs clear to auscultation - no rales, rhonchi or wheezes  CV: regular rate and rhythm,  and no murmur, click,  rub or gallop  ABDOMEN: soft, nontender, without hepatosplenomegaly or masses  MS: extremities normal- no gross deformities noted. Full passive ROM, Active ROM limited in bilateral UE due to pain. Strength diminished in right arm as opposed to left, likely due to pain. Empty can test equivocal, other special tests equivocal due to pain. Bilateral shoulders globally tender to palpation.   SKIN: no suspicious lesions or rashes  NEURO: Normal strength and tone, sensory exam grossly normal, mentation appears intact and speech normal. UE reflexes intact bilaterally, Right arm  strength weak compared to left, strength limited due to pain. Spurling's test illicited increased pain into both arms and back.   PSYCH: mood and affect normal/bright      Assessment and Plan     (M79.601,  M79.602) Bilateral arm pain  (primary encounter diagnosis)  Comment: Pt's exam and history concerning for nerve impingement, reflexes are intact bilaterally, though right arm is weaker than left. Active ROM limited bilaterally, passive ROM normal. Exam negative for specific shoulder pathology, though global tenderness and pain with all active movement is noted. Doubt osteoarthritis, given painless passive ROM. Quality of pain suggests nerve as source of pain. MRI in 2015 did show some disc degeneration in T 3-4, 5-4, 5-6 C5-6 and C6-7 discs with dorsal annular bulging " without impingement. Given reassuring neural exam, recommend continuation of PT. Could consider re-imaging if no improvement in symptoms. Pt did not tolerate gabapentin in the past, so will trial cymbalta to help with pain.   Plan: Physical Therapy Referral, DULoxetine         (CYMBALTA) 30 MG capsule daily for 14 days, then 60mg daily.       -Can continue tylenol and ibuprofen as needed.        -RTC in 1-2 months to review symptoms and medication efficacy.      (Z23) Need for prophylactic vaccination and inoculation against influenza  Plan: INFLUENZA QUAD, RECOMBINANT, P-FREE (RIV4)         (FLUBLOK)          Options for treatment and follow-up care were reviewed with the patient and/or guardian. Sushila Flores and/or guardian engaged in the decision making process and verbalized understanding of the options discussed and agreed with the final plan.      Mario Cade DO [facility] Resident, PGY-2  Pager: [phone]    Precepted today with: Herson Akins MD

## 2021-10-20 ENCOUNTER — MYC MEDICAL ADVICE (OUTPATIENT)
Dept: FAMILY MEDICINE | Facility: CLINIC | Age: 61
End: 2021-10-20

## 2021-10-28 ENCOUNTER — OFFICE VISIT (OUTPATIENT)
Dept: FAMILY MEDICINE | Facility: CLINIC | Age: 61
End: 2021-10-28
Payer: MEDICARE

## 2021-10-28 VITALS
RESPIRATION RATE: 16 BRPM | TEMPERATURE: 98.2 F | SYSTOLIC BLOOD PRESSURE: 108 MMHG | DIASTOLIC BLOOD PRESSURE: 79 MMHG | HEART RATE: 97 BPM

## 2021-10-28 DIAGNOSIS — M54.30 SCIATIC NERVE PAIN, UNSPECIFIED LATERALITY: ICD-10-CM

## 2021-10-28 DIAGNOSIS — H10.13 ALLERGIC CONJUNCTIVITIS, BILATERAL: Primary | ICD-10-CM

## 2021-10-28 DIAGNOSIS — E78.5 HYPERLIPIDEMIA LDL GOAL <100: ICD-10-CM

## 2021-10-28 DIAGNOSIS — J30.2 SEASONAL ALLERGIC RHINITIS, UNSPECIFIED TRIGGER: ICD-10-CM

## 2021-10-28 DIAGNOSIS — E11.9 TYPE 2 DIABETES MELLITUS WITHOUT COMPLICATION, WITHOUT LONG-TERM CURRENT USE OF INSULIN (H): ICD-10-CM

## 2021-10-28 DIAGNOSIS — E66.01 MORBID OBESITY (H): ICD-10-CM

## 2021-10-28 DIAGNOSIS — Z23 HIGH PRIORITY FOR 2019-NCOV VACCINE: ICD-10-CM

## 2021-10-28 LAB
CHOLEST SERPL-MCNC: 203 MG/DL
FASTING STATUS PATIENT QL REPORTED: NO
HBA1C MFR BLD: 6.4 % (ref 0–5.6)
HDLC SERPL-MCNC: 54 MG/DL
LDLC SERPL CALC-MCNC: 130 MG/DL
TRIGL SERPL-MCNC: 96 MG/DL

## 2021-10-28 PROCEDURE — 80061 LIPID PANEL: CPT | Performed by: FAMILY MEDICINE

## 2021-10-28 PROCEDURE — 99214 OFFICE O/P EST MOD 30 MIN: CPT | Mod: 25 | Performed by: FAMILY MEDICINE

## 2021-10-28 PROCEDURE — 0002A COVID-19,PF,PFIZER (12+ YRS): CPT | Performed by: FAMILY MEDICINE

## 2021-10-28 PROCEDURE — 91300 COVID-19,PF,PFIZER (12+ YRS): CPT | Performed by: FAMILY MEDICINE

## 2021-10-28 PROCEDURE — 83036 HEMOGLOBIN GLYCOSYLATED A1C: CPT | Performed by: FAMILY MEDICINE

## 2021-10-28 PROCEDURE — 36415 COLL VENOUS BLD VENIPUNCTURE: CPT | Performed by: FAMILY MEDICINE

## 2021-10-28 RX ORDER — FLUTICASONE PROPIONATE 50 MCG
1 SPRAY, SUSPENSION (ML) NASAL DAILY
Qty: 18.2 ML | Refills: 11 | Status: SHIPPED | OUTPATIENT
Start: 2021-10-28 | End: 2022-09-27

## 2021-10-28 RX ORDER — DULOXETIN HYDROCHLORIDE 20 MG/1
60 CAPSULE, DELAYED RELEASE ORAL DAILY
Qty: 90 CAPSULE | Refills: 11 | Status: SHIPPED | OUTPATIENT
Start: 2021-10-28

## 2021-10-28 RX ORDER — OLOPATADINE HYDROCHLORIDE 1 MG/ML
1 SOLUTION/ DROPS OPHTHALMIC 2 TIMES DAILY
Qty: 5 ML | Refills: 11 | Status: SHIPPED | OUTPATIENT
Start: 2021-10-28

## 2021-10-28 RX ORDER — CETIRIZINE HYDROCHLORIDE 10 MG/1
10 TABLET ORAL DAILY
Qty: 90 TABLET | Refills: 4 | Status: SHIPPED | OUTPATIENT
Start: 2021-10-28 | End: 2022-09-27

## 2021-10-28 NOTE — PROGRESS NOTES
"Patient presents with:  Eye Problem: L eye itchiness and white discharge x 2 days, R eye, same sx beginning 1 day ago  Medication Reconciliation: Needs Attention  Imm/Inj: COVID-19 VACCINE      Assessment & Plan   Problem List Items Addressed This Visit        Active Problems    Morbid obesity (H)    Sciatic nerve pain    Relevant Medications    DULoxetine (CYMBALTA) 20 MG capsule    Type 2 diabetes mellitus without complication (H)    Relevant Orders    Hemoglobin A1c (Completed)      Other Visit Diagnoses     Allergic conjunctivitis, bilateral    -  Primary    Relevant Medications    cetirizine (ZYRTEC) 10 MG tablet    olopatadine (PATANOL) 0.1 % ophthalmic solution    fluticasone (FLONASE) 50 MCG/ACT nasal spray    Other Relevant Orders    Adult Allergy/Asthma Referral    Seasonal allergic rhinitis, unspecified trigger        Relevant Medications    cetirizine (ZYRTEC) 10 MG tablet    fluticasone (FLONASE) 50 MCG/ACT nasal spray    Other Relevant Orders    Adult Allergy/Asthma Referral    High priority for 2019-nCoV vaccine        Relevant Orders    COVID-19,PF,PFIZER (12+ Yrs) (Completed)    Hyperlipidemia LDL goal <100        Relevant Orders    Lipid Profile (Completed)           Review of external notes as documented elsewhere in note  28 minutes spent on the date of the encounter doing chart review, history and exam, documentation and further activities per the note       BMI:   Estimated body mass index is 38.04 kg/m  as calculated from the following:    Height as of 10/12/21: 1.59 m (5' 2.6\").    Weight as of 10/12/21: 96.2 kg (212 lb).   Weight management plan: Discussed healthy diet and exercise guidelines    MEDICATIONS:   Orders Placed This Encounter   Medications     cetirizine (ZYRTEC) 10 MG tablet     Sig: Take 1 tablet (10 mg) by mouth daily     Dispense:  90 tablet     Refill:  4     olopatadine (PATANOL) 0.1 % ophthalmic solution     Sig: Place 1 drop into both eyes 2 times daily     Dispense:  5 " mL     Refill:  11     fluticasone (FLONASE) 50 MCG/ACT nasal spray     Sig: Spray 1 spray into both nostrils daily     Dispense:  18.2 mL     Refill:  11     DULoxetine (CYMBALTA) 20 MG capsule     Sig: Take 3 capsules (60 mg) by mouth daily     Dispense:  90 capsule     Refill:  11     Medications Discontinued During This Encounter   Medication Reason     cetirizine (ZYRTEC) 10 MG tablet Reorder     bacitracin 500 UNIT/GM external ointment      atorvastatin (LIPITOR) 80 MG tablet      diphenhydrAMINE (BENADRYL) 25 MG tablet      famotidine (PEPCID) 10 MG tablet      lisinopril (PRINIVIL/ZESTRIL) 10 MG tablet      DULoxetine (CYMBALTA) 30 MG capsule      DULoxetine (CYMBALTA) 20 MG capsule Reorder     triamcinolone (KENALOG) 0.1 % external cream           - Continue other medications without change  Work on weight loss  Regular exercise  Patient Instructions   We are trying some meds for your allergies.  Covid booster today.  See allergist  Will get lab results to you.  Med list updated.    Your health will be better with weight loss. To lose weight, you will need to increase your walking slowly (a minute or 2 a day) until you are walking 60 minutes every day. To reduce your total calories you will need to stop eating rice, potatos, breads, cookies, cakes, and sweets. You can get your carbohydrates from fruits and vegetables. Your meal portions will have to be small.      Allergy location will reach out to the patient to help schedule.       Return in about 1 month (around 11/28/2021) for BP Recheck, allergies, weight.    Herson Akins MD  [facility]    Blue Conti is a 60 year old who presents for the following health issues     HPI       SUBJECTIVE:  A 60-year-old woman in for check of red injected eyes over the last few days.  They are itchy, burning and swollen.  She has had a little bit of discharge from each eye.  She has not had fever or chills.  She has not had other  URI symptoms.  She has had nasal congestion associated with her allergies.  She also had itchy nose and itchy throat.  Her daughter was recently allergy tested and is allergic to many different pollens and trees.    Sushila has never been allergy tested and would like to try that, so we are going to send her to allergy.    OBJECTIVE:  HEENT:  Her eyes are injected and there is chemosis present.  There is no cobblestoning on the lower eyelid.  Pupils are equal, round, reactive to light, and extraocular movements are normal.  Throat clear.  Nose slightly congested.  LUNGS:  Clear.  SKIN:  No rash.    ASSESSMENT:  Allergic conjunctivitis with seasonal allergies.  Will add Patanol for her eyes and restart her on cetirizine and Flonase.  We talked about COVID-19, and she would like the booster today.  She got the Jan and Jan for her first shot, so we are going to use either Moderna or Pfizer for the booster, depending on which is available today.  I have sent a referral for her to see the allergist for allergy testing, and we will await their opinion for other interventions.  We talked about diet and exercise.    She has stopped all her medications so would need at some point to recheck her lipids and her A1c.  We will get results to her when available.  Patient involved in medical decision-making throughout the visit.          Review of Systems   Constitutional, HEENT, cardiovascular, pulmonary, gi and gu systems are negative, except as otherwise noted.      Objective    /79 (BP Location: Right arm, Patient Position: Sitting, Cuff Size: Adult Regular)   Pulse 97   Temp 98.2  F (36.8  C) (Oral)   Resp 16   There is no height or weight on file to calculate BMI.  Physical Exam     Lab Results   Component Value Date    A1C 6.4 10/28/2021    A1C 6.5 05/20/2021    A1C 6.5 08/06/2020    A1C 6.1 10/31/2019    A1C 6.2 04/05/2019    A1C 6.0 05/30/2018     Recent Labs   Lab Test 10/28/21  1406 08/06/20  1147  04/05/19  1453 04/05/19  1453   CHOL 203* 214.0*   < > 202.0*   HDL 54 70.0   < > 63.0   * 130.0*   < > 123.0*   TRIG 96 69.0   < > 82.0   CHOLHDLRATIO  --  3.1  --  3.2    < > = values in this interval not displayed.

## 2021-10-28 NOTE — PATIENT INSTRUCTIONS
We are trying some meds for your allergies.  Covid booster today.  See allergist  Will get lab results to you.  Med list updated.    Your health will be better with weight loss. To lose weight, you will need to increase your walking slowly (a minute or 2 a day) until you are walking 60 minutes every day. To reduce your total calories you will need to stop eating rice, potatos, breads, cookies, cakes, and sweets. You can get your carbohydrates from fruits and vegetables. Your meal portions will have to be small.      Allergy location will reach out to the patient to help schedule.

## 2021-11-04 ENCOUNTER — TELEPHONE (OUTPATIENT)
Dept: FAMILY MEDICINE | Facility: CLINIC | Age: 61
End: 2021-11-04

## 2021-11-04 NOTE — TELEPHONE ENCOUNTER
Prior Authorization Approval    Authorization Effective Date: 8/6/2021  Authorization Expiration Date: 11/4/2022  Medication: olopatadine (PATANOL) 0.1 % ophthalmic solution-APPROVED  Approved Dose/Quantity:   Reference #:     Insurance Company: [employer] Phone [phone] Fax [phone]  Expected CoPay:       CoPay Card Available:      Foundation Assistance Needed:    Which Pharmacy is filling the prescription (Not needed for infusion/clinic administered): [facility] - [place], [place] - [address] [place] [address]  Pharmacy Notified: Yes  Patient Notified: No    Pharmacy will notify patient when medication is ready.

## 2021-11-04 NOTE — TELEPHONE ENCOUNTER
Prior Authorization needed on:  21    Medication:  Duloxetine Dose:  20mg    Pharmacy confirmed as   [id number] - [place], [place] - [address] [place] [address]  Phone: [phone] Fax: [phone]  : Yes    Insurance Name:    Insurance Phone:   Insurance Patient ID:    CMM:  Key: [id number]  WINSTONN: BROOK[place]  :[id number]     Alternatives Suggested:      Nicole Quezada MA 2021 at 9:07 AM

## 2021-11-04 NOTE — TELEPHONE ENCOUNTER
Prior Authorization Approval    Authorization Effective Date: 8/6/2021  Authorization Expiration Date: 12/31/2021  Medication: Duloxetine 20mg- APPROVED   Approved Dose/Quantity:   Reference #:     Insurance Company: [employer] - Phone [phone] Fax [phone]  Expected CoPay:       CoPay Card Available:      Foundation Assistance Needed:    Which Pharmacy is filling the prescription (Not needed for infusion/clinic administered): [id number] - [place], [place] - [address] [place] [address]  Pharmacy Notified: Yes  Patient Notified:  **Instructed pharmacy to notify patient when script is ready to /ship.**

## 2021-11-04 NOTE — TELEPHONE ENCOUNTER
Central Prior Authorization Team  Phone: [phone]    PA Initiation    Medication: Duloxetine 20mg  Insurance Company: [employer] - Phone [phone] Fax [phone]  Pharmacy Filling the Rx: [facility] - [place], [place] - [address] [place] [address]  Filling Pharmacy Phone: [phone]  Filling Pharmacy Fax:    Start Date: 11/4/2021

## 2021-11-04 NOTE — TELEPHONE ENCOUNTER
Prior Authorization Retail Medication Request  Pharmacy requested pa    Medication/Dose: olopatadine (PATANOL) 0.1 % ophthalmic solution  ICD code (if different than what is on RX):    Previously Tried and Failed:    Rationale:     Insurance Name:   Insurance ID:       Pharmacy Information (if different than what is on RX)  Name: [facility]  Phone: [phone]

## 2021-11-04 NOTE — TELEPHONE ENCOUNTER
Central Prior Authorization Team   Phone: [phone]      PA Initiation    Medication: olopatadine (PATANOL) 0.1 % ophthalmic solution-Initiated  Insurance Company: Medicare Blue - Phone [phone] Fax [phone]  Pharmacy Filling the Rx: [id number] - [place], [place] - [address] [place] [address]  Filling Pharmacy Phone: [phone]  Filling Pharmacy Fax:    Start Date: 11/4/2021

## 2021-11-27 ENCOUNTER — HEALTH MAINTENANCE LETTER (OUTPATIENT)
Age: 61
End: 2021-11-27

## 2022-05-14 ENCOUNTER — HEALTH MAINTENANCE LETTER (OUTPATIENT)
Age: 62
End: 2022-05-14

## 2022-08-16 DIAGNOSIS — M54.42 CHRONIC LEFT-SIDED LOW BACK PAIN WITH LEFT-SIDED SCIATICA: ICD-10-CM

## 2022-08-16 DIAGNOSIS — G89.29 CHRONIC LEFT-SIDED LOW BACK PAIN WITH LEFT-SIDED SCIATICA: ICD-10-CM

## 2022-08-16 RX ORDER — CYCLOBENZAPRINE HCL 10 MG
10 TABLET ORAL 3 TIMES DAILY PRN
Qty: 30 TABLET | Refills: 0 | Status: SHIPPED | OUTPATIENT
Start: 2022-08-16 | End: 2022-09-27

## 2022-08-16 NOTE — TELEPHONE ENCOUNTER
Refilled flexeril 30 tablets for chronic left sided low back pain. Will have team reach out and see if she needs an appointment for any change with this chronic pain.

## 2022-08-16 NOTE — TELEPHONE ENCOUNTER
Called patient to relay message, per patient no new symptoms just normal chronic pain that she has on and off from what she does for work.

## 2022-08-19 ENCOUNTER — VIRTUAL VISIT (OUTPATIENT)
Dept: FAMILY MEDICINE | Facility: CLINIC | Age: 62
End: 2022-08-19
Payer: COMMERCIAL

## 2022-08-19 DIAGNOSIS — G89.29 CHRONIC LEFT-SIDED LOW BACK PAIN WITH LEFT-SIDED SCIATICA: Primary | ICD-10-CM

## 2022-08-19 DIAGNOSIS — M48.061 SPINAL STENOSIS OF LUMBAR REGION, UNSPECIFIED WHETHER NEUROGENIC CLAUDICATION PRESENT: ICD-10-CM

## 2022-08-19 DIAGNOSIS — M79.7 FIBROMYALGIA: ICD-10-CM

## 2022-08-19 DIAGNOSIS — M54.42 CHRONIC LEFT-SIDED LOW BACK PAIN WITH LEFT-SIDED SCIATICA: Primary | ICD-10-CM

## 2022-08-19 PROCEDURE — 99213 OFFICE O/P EST LOW 20 MIN: CPT | Mod: 95 | Performed by: STUDENT IN AN ORGANIZED HEALTH CARE EDUCATION/TRAINING PROGRAM

## 2022-08-19 NOTE — PROGRESS NOTES
Preceptor Attestation:    I discussed the patient with the resident. I have verified the content of the note, which accurately reflects my assessment of the patient and the plan of care.   Supervising Physician:  Juani Edward MD.        [phone]

## 2022-08-19 NOTE — PROGRESS NOTES
"Family Medicine Video Visit Note  Sushila is being evaluated via a billable video visit.               Video Visit Consent     Patient was verbally read the following and verbal consent was obtained.  \"Video visits are billed at different rates depending on your insurance coverage. During this emergency period, for some insurers they may be billed the same as an in-person visit.  Please reach out to your insurance provider with any questions.  If during the course of the call the physician/provider feels a video visit is not appropriate, you will not be charged for this service.\"     (Name person giving consent:  Patient   Date verbal consent given:  8/19/2022  Time verbal consent given:  4:03 PM)      Chief Complaint   Patient presents with     Musculoskeletal Problem     Left leg pain, pain that shoots down leg              HPI     Video Start Time: 4:50 PM    Visit transitioned to telephone visit due to technical difficulties.     Sushila presents to clinic today for the following health issues:    Back Pain  Chronic pain bothersome, does office work, has been using broom handle for cane, feels like bad charley horse, from buttock down leg. Denies any saddle anesthesia, fevers, no trauma, no bladder or bowel changes. Pain seems to be worsening. She has recently noticed tingling in her finger tips. This has been ongoing for quite some time. Had requested refill of flexeril earlier this week, had just picked this up today.     Wondering if she can get referral for accupuncture. Brother and mom had back surgeries, would like to avoid surgery if at all possible.     Lost 4lbs recently, has been getting regular exercise.     Had been seen in 2019 by [facility]. At that time recommended continued PT and possible steroid injection. Would like to be reestablished.     Current Outpatient Medications   Medication Sig Dispense Refill     Blood Gluc Meter Disp-Strips (BLOOD GLUCOSE METER DISPOSABLE) MARCIANO Dispense per " "preferred. Test sugars as needed up to 1 per day. Dx250.0 Strips and lancets #90 1 each 0     blood glucose monitoring (SHAGUFTA MICROLET) lancets Use to test blood sugar 1 times daily or as directed. 100 each prn     blood glucose monitoring (NO BRAND SPECIFIED) test strip Use to test blood sugars 1-2 times daily or as directed 100 strip prn     cetirizine (ZYRTEC) 10 MG tablet Take 1 tablet (10 mg) by mouth daily 90 tablet 4     cyclobenzaprine (FLEXERIL) 10 MG tablet Take 1 tablet (10 mg) by mouth 3 times daily as needed for muscle spasms 30 tablet 0     DULoxetine (CYMBALTA) 20 MG capsule Take 3 capsules (60 mg) by mouth daily 90 capsule 11     fluticasone (FLONASE) 50 MCG/ACT nasal spray Spray 1 spray into both nostrils daily 18.2 mL 11     olopatadine (PATANOL) 0.1 % ophthalmic solution Place 1 drop into both eyes 2 times daily 5 mL 11     order for DME Electronic blood pressure cuff - check blood pressure 2-3 times weekly (Patient not taking: Reported on 7/19/2021) 1 Device 0     Allergies   Allergen Reactions     Amoxicillin Swelling     Penicillin G Nausea and Vomiting     Percocet [Oxycodone-Acetaminophen]      Vicodin [Hydrocodone-Acetaminophen]      Metformin Itching     All over itching              Review of Systems:              Physical Exam:     There were no vitals taken for this visit.  Estimated body mass index is 38.04 kg/m  as calculated from the following:    Height as of 10/12/21: 1.59 m (5' 2.6\").    Weight as of 10/12/21: 96.2 kg (212 lb).    Exam not performed, as this was a telephone visit.        Assessment and Plan       ICD-10-CM    1. Chronic left-sided low back pain with left-sided sciatica  M54.42 Spine  Referral    G89.29    2. Fibromyalgia  M79.7    3. Spinal stenosis of lumbar region, unspecified whether neurogenic claudication present  M48.061      Pt would like to be reestablished with orthopedic spine care. Interested in epidural steroid injection. [facility] is familiar with " her, we will send her back for further evaluation of back pain. Previous MRIs have shown; mild L4-5 spondylolisthesis of L4-5 with severe right and moderate left facet joint arthritis and ligamentum flavum thickening.  Moderate spinal canal stenosis at L4-5 as well as likely impingement of the bilateral L5 nerve roots in the lateral recess.  At L5-S1 there is also a 2 mm spondylolisthesis with severe left greater than right facet arthropathy.  Severe left foraminal stenosis.  Pt wanted to hold off on PT for now, as she is exercising on her own. Recommended restarting this soon, as it does show benefit in chronic back pain.   Evaluation of her back pain was challenging over the phone, recommended that she present to clinic for further evaluation and health maintenance visit.     Refilled medications that would be required in the next 3 months.     After Visit Information:  Will print and mail AVS       No follow-ups on file.      Video-Visit Details    Type of service:  Video Visit    Video End Time (time video stopped): 5:21 PM    Originating Location (pt. Location): Home    Distant Location (provider location):  [facility]     Platform used for Video Visit: Cheryl Cade,   I precepted today with Juani Edward MD

## 2022-09-04 ENCOUNTER — HEALTH MAINTENANCE LETTER (OUTPATIENT)
Age: 62
End: 2022-09-04

## 2022-09-27 ENCOUNTER — OFFICE VISIT (OUTPATIENT)
Dept: FAMILY MEDICINE | Facility: CLINIC | Age: 62
End: 2022-09-27

## 2022-09-27 ENCOUNTER — OFFICE VISIT (OUTPATIENT)
Dept: FAMILY MEDICINE | Facility: CLINIC | Age: 62
End: 2022-09-27
Payer: COMMERCIAL

## 2022-09-27 VITALS
BODY MASS INDEX: 35.79 KG/M2 | RESPIRATION RATE: 18 BRPM | HEART RATE: 90 BPM | OXYGEN SATURATION: 100 % | SYSTOLIC BLOOD PRESSURE: 126 MMHG | HEIGHT: 63 IN | WEIGHT: 202 LBS | DIASTOLIC BLOOD PRESSURE: 86 MMHG

## 2022-09-27 DIAGNOSIS — Z23 NEED FOR PROPHYLACTIC VACCINATION AND INOCULATION AGAINST INFLUENZA: ICD-10-CM

## 2022-09-27 DIAGNOSIS — Z00.00 ENCOUNTER FOR PREVENTIVE HEALTH EXAMINATION: Primary | ICD-10-CM

## 2022-09-27 DIAGNOSIS — E11.9 TYPE 2 DIABETES MELLITUS WITHOUT COMPLICATION, WITHOUT LONG-TERM CURRENT USE OF INSULIN (H): ICD-10-CM

## 2022-09-27 DIAGNOSIS — Z12.31 ENCOUNTER FOR SCREENING MAMMOGRAM FOR MALIGNANT NEOPLASM OF BREAST: ICD-10-CM

## 2022-09-27 DIAGNOSIS — L73.9 FOLLICULITIS: ICD-10-CM

## 2022-09-27 DIAGNOSIS — G89.29 CHRONIC LEFT-SIDED LOW BACK PAIN WITH LEFT-SIDED SCIATICA: ICD-10-CM

## 2022-09-27 DIAGNOSIS — M54.42 CHRONIC LEFT-SIDED LOW BACK PAIN WITH LEFT-SIDED SCIATICA: ICD-10-CM

## 2022-09-27 DIAGNOSIS — Z00.00 WELLNESS EXAMINATION: Primary | ICD-10-CM

## 2022-09-27 DIAGNOSIS — Z23 HIGH PRIORITY FOR 2019-NCOV VACCINE: ICD-10-CM

## 2022-09-27 DIAGNOSIS — E66.01 MORBID OBESITY (H): ICD-10-CM

## 2022-09-27 DIAGNOSIS — J30.2 SEASONAL ALLERGIC RHINITIS, UNSPECIFIED TRIGGER: ICD-10-CM

## 2022-09-27 LAB
ANION GAP SERPL CALCULATED.3IONS-SCNC: 9 MMOL/L (ref 7–15)
BUN SERPL-MCNC: 14.8 MG/DL (ref 8–23)
CALCIUM SERPL-MCNC: 9.7 MG/DL (ref 8.6–10)
CHLORIDE SERPL-SCNC: 103 MMOL/L (ref 98–107)
CREAT SERPL-MCNC: 0.82 MG/DL (ref 0.51–0.95)
DEPRECATED HCO3 PLAS-SCNC: 25 MMOL/L (ref 22–29)
GFR SERPL CREATININE-BSD FRML MDRD: 82 ML/MIN/1.73M2
GLUCOSE SERPL-MCNC: 102 MG/DL (ref 70–99)
HBA1C MFR BLD: 6.4 % (ref 0–5.6)
POTASSIUM SERPL-SCNC: 4.6 MMOL/L (ref 3.4–5.3)
SODIUM SERPL-SCNC: 137 MMOL/L (ref 136–145)

## 2022-09-27 PROCEDURE — 90677 PCV20 VACCINE IM: CPT | Performed by: STUDENT IN AN ORGANIZED HEALTH CARE EDUCATION/TRAINING PROGRAM

## 2022-09-27 PROCEDURE — 90682 RIV4 VACC RECOMBINANT DNA IM: CPT | Performed by: STUDENT IN AN ORGANIZED HEALTH CARE EDUCATION/TRAINING PROGRAM

## 2022-09-27 PROCEDURE — 91312 COVID-19,PF,PFIZER BOOSTER BIVALENT: CPT | Performed by: STUDENT IN AN ORGANIZED HEALTH CARE EDUCATION/TRAINING PROGRAM

## 2022-09-27 PROCEDURE — 82043 UR ALBUMIN QUANTITATIVE: CPT | Performed by: STUDENT IN AN ORGANIZED HEALTH CARE EDUCATION/TRAINING PROGRAM

## 2022-09-27 PROCEDURE — 0124A COVID-19,PF,PFIZER BOOSTER BIVALENT: CPT | Performed by: STUDENT IN AN ORGANIZED HEALTH CARE EDUCATION/TRAINING PROGRAM

## 2022-09-27 PROCEDURE — 99207 PR NO BILLABLE SERVICE THIS VISIT: CPT

## 2022-09-27 PROCEDURE — 80048 BASIC METABOLIC PNL TOTAL CA: CPT | Performed by: STUDENT IN AN ORGANIZED HEALTH CARE EDUCATION/TRAINING PROGRAM

## 2022-09-27 PROCEDURE — 90471 IMMUNIZATION ADMIN: CPT | Performed by: STUDENT IN AN ORGANIZED HEALTH CARE EDUCATION/TRAINING PROGRAM

## 2022-09-27 PROCEDURE — 99207 PR FOOT EXAM NO CHARGE: CPT | Performed by: STUDENT IN AN ORGANIZED HEALTH CARE EDUCATION/TRAINING PROGRAM

## 2022-09-27 PROCEDURE — 36415 COLL VENOUS BLD VENIPUNCTURE: CPT | Performed by: STUDENT IN AN ORGANIZED HEALTH CARE EDUCATION/TRAINING PROGRAM

## 2022-09-27 PROCEDURE — 90472 IMMUNIZATION ADMIN EACH ADD: CPT | Performed by: STUDENT IN AN ORGANIZED HEALTH CARE EDUCATION/TRAINING PROGRAM

## 2022-09-27 PROCEDURE — 83036 HEMOGLOBIN GLYCOSYLATED A1C: CPT | Performed by: STUDENT IN AN ORGANIZED HEALTH CARE EDUCATION/TRAINING PROGRAM

## 2022-09-27 PROCEDURE — 99396 PREV VISIT EST AGE 40-64: CPT | Mod: 25 | Performed by: STUDENT IN AN ORGANIZED HEALTH CARE EDUCATION/TRAINING PROGRAM

## 2022-09-27 PROCEDURE — 99214 OFFICE O/P EST MOD 30 MIN: CPT | Mod: 25 | Performed by: STUDENT IN AN ORGANIZED HEALTH CARE EDUCATION/TRAINING PROGRAM

## 2022-09-27 RX ORDER — ATORVASTATIN CALCIUM 20 MG/1
20 TABLET, FILM COATED ORAL DAILY
Qty: 90 TABLET | Refills: 3 | Status: SHIPPED | OUTPATIENT
Start: 2022-09-27

## 2022-09-27 RX ORDER — FLUTICASONE PROPIONATE 50 MCG
1 SPRAY, SUSPENSION (ML) NASAL DAILY
Qty: 18.2 ML | Refills: 11 | Status: SHIPPED | OUTPATIENT
Start: 2022-09-27 | End: 2023-11-16

## 2022-09-27 RX ORDER — LANCETS
1 EACH MISCELLANEOUS 2 TIMES DAILY
Qty: 100 EACH | Refills: 3 | Status: SHIPPED | OUTPATIENT
Start: 2022-09-27 | End: 2022-09-28

## 2022-09-27 RX ORDER — CETIRIZINE HYDROCHLORIDE 10 MG/1
10 TABLET ORAL DAILY
Qty: 90 TABLET | Refills: 4 | Status: SHIPPED | OUTPATIENT
Start: 2022-09-27 | End: 2023-11-16

## 2022-09-27 RX ORDER — CLINDAMYCIN PHOSPHATE 10 MG/G
GEL TOPICAL 2 TIMES DAILY
Qty: 1 ML | Refills: 0 | Status: SHIPPED | OUTPATIENT
Start: 2022-09-27 | End: 2022-09-28

## 2022-09-27 RX ORDER — CYCLOBENZAPRINE HCL 10 MG
10 TABLET ORAL 3 TIMES DAILY PRN
Qty: 30 TABLET | Refills: 0 | Status: SHIPPED | OUTPATIENT
Start: 2022-09-27 | End: 2022-12-05

## 2022-09-27 ASSESSMENT — ENCOUNTER SYMPTOMS
EYE PAIN: 0
DIARRHEA: 0
HEMATURIA: 0
SHORTNESS OF BREATH: 0
JOINT SWELLING: 1
COUGH: 0
WEAKNESS: 1
DYSURIA: 0
SORE THROAT: 0
HEARTBURN: 1
PALPITATIONS: 0
NERVOUS/ANXIOUS: 0
NAUSEA: 0
EYE PAIN: 0
BREAST MASS: 0
MYALGIAS: 1
PALPITATIONS: 0
HEADACHES: 0
CONSTIPATION: 0
ABDOMINAL PAIN: 0
DIARRHEA: 0
DIZZINESS: 0
DIZZINESS: 0
ARTHRALGIAS: 1
DYSURIA: 0
CONSTIPATION: 0
ARTHRALGIAS: 1
HEMATOCHEZIA: 0
NAUSEA: 0
FEVER: 0
COUGH: 0
PARESTHESIAS: 0
HEMATOCHEZIA: 0
PARESTHESIAS: 0
ABDOMINAL PAIN: 0
HEADACHES: 0
HEMATURIA: 0
MYALGIAS: 1
HEARTBURN: 1
NERVOUS/ANXIOUS: 0
FEVER: 0
FREQUENCY: 1
CHILLS: 1
SORE THROAT: 0
JOINT SWELLING: 1
BREAST MASS: 0
SHORTNESS OF BREATH: 0
WEAKNESS: 1
CHILLS: 1
FREQUENCY: 1

## 2022-09-27 ASSESSMENT — ACTIVITIES OF DAILY LIVING (ADL)
CURRENT_FUNCTION: TRANSPORTATION REQUIRES ASSISTANCE
CURRENT_FUNCTION: LAUNDRY REQUIRES ASSISTANCE
CURRENT_FUNCTION: TRANSPORTATION REQUIRES ASSISTANCE
CURRENT_FUNCTION: SHOPPING REQUIRES ASSISTANCE
CURRENT_FUNCTION: SHOPPING REQUIRES ASSISTANCE
CURRENT_FUNCTION: LAUNDRY REQUIRES ASSISTANCE

## 2022-09-27 NOTE — PATIENT INSTRUCTIONS
At your visit today, we discussed your risk for falls and preventive options.    Fall Prevention  Falls often occur due to slipping, tripping or losing your balance. Millions of people fall every year and injure themselves. Here are ways to reduce your risk of falling again.   Think about your fall, was there anything that caused your fall that can be fixed, removed, or replaced?  Make your home safe by keeping walkways clear of objects you may trip over, such as electric cords.  Use non-slip pads under rugs. Don't use area rugs or small throw rugs.  Use non-slip mats in bathtubs and showers.  Install handrails and lights on staircases. The handrails should be on both sides of the stairs.  Don't walk in poorly lit areas.  Don't stand on chairs or wobbly ladders.  Use caution when reaching overhead or looking upward. This position can cause a loss of balance.  Be sure your shoes fit properly, have non-slip bottoms and are in good condition.   Wear shoes both inside and out. Don't go barefoot or wear slippers.  Be cautious when going up and down stairs, curbs, and when walking on uneven sidewalks.  If your balance is poor, consider using a cane or walker.  If your fall was related to alcohol use, stop or limit alcohol intake.   If your fall was related to use of sleeping medicines, talk to your healthcare provider about this. You may need to reduce your dosage at bedtime if you awaken during the night to go to the bathroom.    To reduce the need for nighttime bathroom trips:  Don't drink fluids for several hours before going to bed  Empty your bladder before going to bed  Men can keep a urinal at the bedside  Stay as active as you can. Balance, flexibility, strength, and endurance all come from exercise. They all play a role in preventing falls. Ask your healthcare provider which types of activity are right for you.  Get your vision checked on a regular basis.  If you have pets, know where they are before you stand  up or walk so you don't trip over them.  Use night lights.  Go over all your medicines with a pharmacist or other healthcare provider to see if any of them could make you more likely to fall.  [employer] last reviewed this educational content on 4/1/2018 2000-2021 [employer]. All rights reserved. This information is not intended as a substitute for professional medical care. Always follow your healthcare professional's instructions.       PERSONAL PREVENTIVE SERVICES PLAN - SERVICES     Review these tests with your medical staff then decide which ones you want and take this page home for your reference      SCREENING TESTS     Description   Year of Last Screening   Recommended Today?   Heart disease screening blood tests  Cholesterol level Reducing cholesterol can reduce your risk of heart attacks by 25%.  Screening is recommended yearly if you are at risk of heart disease otherwise every 4-5 years 10/28/21 No: is not indicated today.   Diabetes screening tests  Hemoglobin A1c blood test   Finding and treating diabetes early can reduce complications.  Screening recommended/covered yearly if you have high blood pressure, high cholesterol, obesity (BMI >30), or a history of high blood glucose tests; or 2 of the following: family history of diabetes, overweight (BMI >25 but <30), age 65 years or older, and a history of diabetes of pregnancy or gave birth to baby weighing more than 9 lbs. 10/28/21  hgbA1c  6.4 Yes; Recommended    Hepatitis B screening Finding hepatitis B early can reduce complications.  Screening is recommended for persons with selected risk factors.  No: is not indicated today.   Hepatitis C screening Finding hepatitis C early can reduce complications.  Screening is recommended for all persons born from 1945 through 1965 and for those with selected other risk factors.  3/17/17  negative No: is not indicated today.   HIV screening Finding HIV early can reduce complications.  Screening is  recommended for persons with risk factors for HIV infection. 5/30/18  negative No: is not indicated today.   Glaucoma screening Early detection of glaucoma can prevent blindness.   Please talk to your eye doctor about this.       SCREENING TESTS     Description   Year of Last Screening   Recommended Today?   Colorectal cancer screening  Fecal occult blood test   Screening colonoscopy Screening for colon cancer has been shown to reduce death from colon cancer by 25-30%. Screening recommended to start at 50 years and continuing until age 75 years.    Yes; Recommended    Breast Cancer Screening (women)  Mammogram Mammogram screening for breast cancer has been shown to reduce the risk of dying from breast cancer and prolong life. Screening is recommended every 1-2 years for women aged 50 to 74 years.  8/20/19  negative Yes; Recommended    Cervical Cancer screening (women)  Pap Cervical pap smears can reduce cervical cancer. Screening is recommended annually if high risk (history of abnormal pap smears) otherwise every 2-3 years, stop screening at 65 years of age if history of normal paps. 5/20/21  negative No: is not indicated today.   Screening for Osteoporosis:  Bone mass measurements (women)  Dexa Scan Screening and treating Osteoporosis can reduce the risk of hip and spine fractures. Screening is recommended in women 65 years or older and in women and men at risk of osteoporosis.  No: is not indicated today.   Screening for Lung Cancer   Low-dose CT scanning Screening can reduce mortality in persons aged 55-80 who have smoked at least 30 pack-years and who are either still smoking or have quit in the past 15 years.  Yes; Recommended    Abdominal Aortic Aneurysm (AAA) screening  Ultrasound (US)   An aneurysm treated before rupture is very safe -a ruptured aneurysm can be fatal.  Screening  by US for AAA is limited to patients who meet one of the following criteria:  Men who are 65-75 years old and have smoked more  than 100 cigarettes in their lifetime  Anyone with a family history of abdominal aortic aneurysm  No: is not indicated today.     Here are your recommended immunizations.  Take this home for your reference.                                                    IMMUNIZATIONS Description Recommend today?     Influenza (Flu shot) Prevents flu; should get every year Yes; Recommended    PCV 20 Pneumonia vaccination; you get it once Yes; Recommended    PPSV 23 Second pneumonia vaccination; usually get it 1 year after PCV 13 No; is up to date.   Zoster (Shingles) Prevents shingles; you get it once  (Check with Part D insurance for coverage, must receive at a pharmacy, not clinic) Yes; Recommended    Tetanus Prevents tetanus; once every 10 years No; is up to date.     Hepatitis B  If you have any of the following risk factors you should be immunized for hepatitis B: severe kidney disease, people who live in the same house as a carrier of Hepatitis B virus, people who live in  institutions (e.g. nursing homes or group homes), homosexual men, patients with hemophilia who received Factor VIII or IX concentrates, abusers of illicit injectable drugs No: is not indicated today.      PATIENT INSTRUCTIONS    Yearly exam:   See your health care provider every year in order to review changes in your health, review medicines that you take, and discuss preventive care needs such as immunizations and cancer screening.  Get a flu shot each year.     Advance Directives:  If you have not done so, you are encouraged to complete advance directives and/or a living will.   More information about advance directives can be found at: http://www.mnmed.org/advocacy/Key-Issues/Advance-Directives    Nutrition:   Eat at least 5 servings of fruits and vegetables each day.   Eat whole-grain bread, whole-wheat pasta and brown rice instead of white grains and rice.   Talk to your doctor about Calcium and Vitamin D.     Lifestyle:  Exercise for at least 150  minutes a week (30 minutes a day, 5 days a week). This will help you control your weight and prevent disease.   Limit alcohol to one drink per day.   If you smoke, try to quit - your doctor will be happy to help.   Wear sunscreen to prevent skin cancer.   See your dentist every six months for an exam and cleaning.   See your eye doctor every 1 to 2 years to screen for conditions such as glaucoma, macular degeneration and cataracts.

## 2022-09-27 NOTE — PROGRESS NOTES
SUBJECTIVE:   Sushila is a 61 year old who presents for Preventive Visit.      Patient has been advised of split billing requirements and indicates understanding: Yes    HPI  Do you feel safe in your environment? Yes    Have you ever done Advance Care Planning? (For example, a Health Directive, POLST, or a discussion with a medical provider or your loved ones about your wishes): Yes, patient states has an Advance Care Planning document and will bring a copy to the clinic.  Fall risk  Fallen 2 or more times in the past year?: No  Any fall with injury in the past year?: No  Timed Up and Go Test (>13.5 is fall risk; contact physician) : 17 (discomfort in back,hips and knees)      Tingling down leg has been present while working, she denies any loss of bowel or bladder control, no fever, no saddle anesthesia.  Notes that she didn't hear back from orthopedics yet.       Cognitive Screening   1) Repeat 3 items (Leader, Season, Table)    2) Clock draw: NORMAL  3) 3 item recall: Recalls 2 objects   Results: NORMAL clock, 1-2 items recalled: COGNITIVE IMPAIRMENT LESS LIKELY    Mini-CogTM Copyright REBEKAH New. Licensed by the author for use in [facility]; reprinted with permission (amado@[facility]). All rights reserved.          Reviewed and updated as needed this visit by clinical staff   Tobacco  Allergies  Meds   Med Hx  Surg Hx  Fam Hx            Reviewed and updated as needed this visit by Provider                   Social History     Tobacco Use     Smoking status: Former Smoker     Types: Cigarettes     Quit date: 12/13/2018     Years since quitting: 3.7     Smokeless tobacco: Never Used   Substance Use Topics     Alcohol use: Yes     Alcohol/week: 0.0 standard drinks     Comment: ocassionally         Alcohol Use 9/27/2022   Prescreen: >3 drinks/day or >7 drinks/week? Not Applicable               Current providers sharing in care for this patient include:   Patient Care Team:  Mario Guthrie DO as PCP -  General (Student in organized health care education/training program)  Cory Mancia MD as MD (Urology)  Elda Wu, RN as Specialty Care Coordinator (Urology)  Cory Mancia MD as Assigned Surgical Provider  Mario Guthrie DO as Assigned PCP    The following health maintenance items are reviewed in Epic and correct as of today:  Health Maintenance   Topic Date Due     ADVANCE CARE PLANNING  Never done     EYE EXAM  Never done     HEPATITIS B IMMUNIZATION (1 of 3 - Risk 3-dose series) Never done     ZOSTER IMMUNIZATION (1 of 2) Never done     LUNG CANCER SCREENING  Never done     Pneumococcal Vaccine: Pediatrics (0 to 5 Years) and At-Risk Patients (6 to 64 Years) (2 - PCV) 10/08/2014     MEDICARE ANNUAL WELLNESS VISIT  01/11/2020     BMP  08/06/2021     MAMMO SCREENING  08/19/2021     COVID-19 Vaccine (3 - Booster for Sulema series) 12/23/2021     A1C  04/28/2022     MICROALBUMIN  05/20/2022     INFLUENZA VACCINE (1) 09/01/2022     LIPID  10/28/2022     DIABETIC FOOT EXAM  09/27/2023     HPV TEST  05/20/2026     PAP  05/20/2026     DTAP/TDAP/TD IMMUNIZATION (3 - Td or Tdap) 03/17/2027     COLORECTAL CANCER SCREENING  08/24/2027     HEPATITIS C SCREENING  Completed     HIV SCREENING  Completed     PHQ-2 (once per calendar year)  Completed     IPV IMMUNIZATION  Aged Out     MENINGITIS IMMUNIZATION  Aged Out           FHS-7: No flowsheet data found.    Mammogram Screening: Recommended mammography every 1-2 years with patient discussion and risk factor consideration  Pertinent mammograms are reviewed under the imaging tab.    Review of Systems   Constitutional: Positive for chills. Negative for fever.   HENT: Negative for congestion, ear pain, hearing loss and sore throat.    Eyes: Positive for visual disturbance. Negative for pain.   Respiratory: Negative for cough and shortness of breath.    Cardiovascular: Positive for peripheral edema. Negative for chest pain and palpitations.   Gastrointestinal:  "Positive for heartburn. Negative for abdominal pain, constipation, diarrhea, hematochezia and nausea.   Breasts:  Negative for tenderness, breast mass and discharge.   Genitourinary: Positive for frequency. Negative for dysuria, genital sores, hematuria, pelvic pain, urgency, vaginal bleeding and vaginal discharge.   Musculoskeletal: Positive for arthralgias, joint swelling and myalgias.   Skin: Positive for rash.   Neurological: Positive for weakness. Negative for dizziness, headaches and paresthesias.   Psychiatric/Behavioral: Negative for mood changes. The patient is not nervous/anxious.        OBJECTIVE:   /86   Pulse 90   Resp 18   Ht 1.61 m (5' 3.39\")   Wt 91.6 kg (202 lb)   SpO2 100%   BMI 35.35 kg/m   Estimated body mass index is 35.35 kg/m  as calculated from the following:    Height as of this encounter: 1.61 m (5' 3.39\").    Weight as of this encounter: 91.6 kg (202 lb).  Physical Exam  GENERAL: healthy, alert and no distress  NECK: no adenopathy, no asymmetry, masses, or scars and thyroid normal to palpation  RESP: lungs clear to auscultation - no rales, rhonchi or wheezes  CV: regular rate and rhythm, normal S1 S2, no S3 or S4, no murmur, click or rub, no peripheral edema and peripheral pulses strong  ABDOMEN: soft, nontender, no hepatosplenomegaly, no masses and bowel sounds normal  MS: no gross musculoskeletal defects noted, no edema    ASSESSMENT / PLAN:   (Z00.00) Encounter for preventive health examination  (primary encounter diagnosis)  Comment: Overall feeling well. Does describe concerning low back/leg pain for which she as been seen at ortho clinic in the past. Will work on reestablishing with PT and ortho to address non-surgical interventions for her back pain.   Plan: Appropriate preventive health screenings ordered, including mammogram. UTD on colorectal screening and PAP.   - Per smoking history does not meet criteria for lung cancer screening.  - Zoster vaccine at " pharmacy      (E11.9) Type 2 diabetes mellitus without complication, without long-term current use of insulin (H)  Comment: A1c 6.4 today, at goal. Pt not taking any diabetic medications. Had reaction to metformin previously. At this time well controlled on diet and exercise alone. For now will continue to manage this way, with plan to recheck A1c in about 6 months.   Plan: Hemoglobin A1c, Albumin Random Urine         Quantitative with Creat Ratio, Basic metabolic         panel, Adult Eye  Referral, MO FOOT         EXAM NO CHARGE, atorvastatin (LIPITOR) 20 MG         tablet, blood glucose (NO BRAND SPECIFIED) test        strip, Pneumococcal 20 Valent Conjugate         (Prevnar 20), [facility] LANCETS ULTRA THIN MISC    (Z12.31) Encounter for screening mammogram for malignant neoplasm of breast  Comment: Due for mammogram  Plan: MA SCREENING DIGITAL BILAT     (M54.42,  G89.29) Chronic left-sided low back pain with left-sided sciatica  Comment: Requesting refill of muscle relaxer, as well as re-starting physical therapy. Pain appears to be related to spinal stenosis, though per records there seems to be hx of fibromyalgia as well. Pt taking cymbalta. At one point streroid injections was recommended, she would like to follow up with that, and avoid surgical intervention if possible.   Plan: Physical Therapy Referral, cyclobenzaprine         (FLEXERIL) 10 MG tablet        Clinic to assist with re-establishing with orthopedics.    (J30.2) Seasonal allergic rhinitis, unspecified trigger  Comment: Refill of allergy medications requested. Stable.   Plan: fluticasone (FLONASE) 50 MCG/ACT nasal spray,         cetirizine (ZYRTEC) 10 MG tablet    (L73.9) Folliculitis  Comment: Lesions on leg and elbow consistent with folliculitis. Will treat with topical abx for short period (1-2 weeks). Return if not improving.  Plan: clindamycin (CLINDAMAX) 1 % external gel,           (E66.01) Morbid obesity (H)  Comment: Pt has had some  "decent weight loss since last visit, notes that she has made some positive dietary changes.   Plan: Continue to monitor.     (Z23) High priority for 2019-nCoV vaccine  Plan: COVID-19,PF,PFIZER BOOSTER BIVALENT 12+Yrs    (Z23) Need for prophylactic vaccination and inoculation against influenza  Plan: INFLUENZA QUAD, RECOMBINANT, P-FREE (RIV4)         (FLUBLOK)             Patient has been advised of split billing requirements and indicates understanding: Yes    COUNSELING:  Reviewed preventive health counseling, as reflected in patient instructions       Regular exercise       Healthy diet/nutrition       Vision screening       Dental care       Advanced Planning     Estimated body mass index is 35.35 kg/m  as calculated from the following:    Height as of this encounter: 1.61 m (5' 3.39\").    Weight as of this encounter: 91.6 kg (202 lb).    Weight management plan: Discussed healthy diet and exercise guidelines    She reports that she quit smoking about 3 years ago. Her smoking use included cigarettes. She has never used smokeless tobacco.      Appropriate preventive services were discussed with this patient, including applicable screening as appropriate for cardiovascular disease, diabetes, osteopenia/osteoporosis, and glaucoma.  As appropriate for age/gender, discussed screening for colorectal cancer, prostate cancer, breast cancer, and cervical cancer. Checklist reviewing preventive services available has been given to the patient.    Reviewed patients plan of care and provided an AVS. The Intermediate Care Plan ( asthma action plan, low back pain action plan, and migraine action plan) for [place] meets the Care Plan requirement. This Care Plan has been established and reviewed with the Patient.    Counseling Resources:  ATP IV Guidelines  Pooled Cohorts Equation Calculator  Breast Cancer Risk Calculator  Breast Cancer: Medication to Reduce Risk  FRAX Risk Assessment  ICSI Preventive Guidelines  Dietary Guidelines " for Americans, 2010  USDA's MyPlate  ASA Prophylaxis  Lung CA Screening    Mario Cade DO  [facility]    Identified Health Risks:

## 2022-09-27 NOTE — PROGRESS NOTES
Preceptor Attestation:   Patient seen, evaluated and discussed with the resident Dr. Cade. I have verified the content of the note, which accurately reflects my assessment of the patient and the plan of care.  Supervising Physician:Benjamin Rosenstein, MD, MA  [facility]

## 2022-09-27 NOTE — LETTER
September 28, 2022      Sushila Flores  [address] [place] [address]        Dear ,    We are writing to inform you of your test results.    Dear Sushila Flores,     Thank you for visiting our clinic on Sep 27, 2022.     The result of your recent labwork has returned. Your A1c looks great as we discussed in clinic. Keep up the good work with your diabetes and eating a healthy diet. Your kidney and electrolyte labs were normal. Your urine protein was normal, which means that your kidneys are doing well!     If you have any questions or concerns, please feel free to give us a call.       Resulted Orders   Hemoglobin A1c   Result Value Ref Range    Hemoglobin A1C 6.4 (H) 0.0 - 5.6 %      Comment:      Normal <5.7%   Prediabetes 5.7-6.4%    Diabetes 6.5% or higher     Note: Adopted from ADA consensus guidelines.   Albumin Random Urine Quantitative with Creat Ratio   Result Value Ref Range    Albumin Urine mg/L <12.0 mg/L      Comment:      The reference ranges have not been established in urine albumin. The results should be integrated into the clinical context for interpretation.    Albumin Urine mg/g Cr        Comment:      Unable to calculate, urine albumin and/or urine creatinine is outside detectable limits.  Microalbuminuria is defined as an albumin:creatinine ratio of 17 to 299 for males and 25 to 299 for females. A ratio of albumin:creatinine of 300 or higher is indicative of overt proteinuria.  Due to biologic variability, positive results should be confirmed by a second, first-morning random or 24-hour timed urine specimen. If there is discrepancy, a third specimen is recommended. When 2 out of 3 results are in the microalbuminuria range, this is evidence for incipient nephropathy and warrants increased efforts at glucose control, blood pressure control, and institution of therapy with an angiotensin-converting-enzyme (ACE) inhibitor (if the patient can tolerate it).      Creatinine  Urine mg/dL 63.6 mg/dL      Comment:      The reference ranges have not been established in urine creatinine. The results should be integrated into the clinical context for interpretation.   Basic metabolic panel   Result Value Ref Range    Sodium 137 136 - 145 mmol/L    Potassium 4.6 3.4 - 5.3 mmol/L    Chloride 103 98 - 107 mmol/L    Carbon Dioxide (CO2) 25 22 - 29 mmol/L    Anion Gap 9 7 - 15 mmol/L    Urea Nitrogen 14.8 8.0 - 23.0 mg/dL    Creatinine 0.82 0.51 - 0.95 mg/dL    Calcium 9.7 8.6 - 10.0 mg/dL    Glucose 102 (H) 70 - 99 mg/dL    GFR Estimate 82 >60 mL/min/1.73m2      Comment:      Effective December 21, 2021 eGFRcr in adults is calculated using the 2021 CKD-EPI creatinine equation which includes age and gender (Jasmin et al., NEJM, DOI: 10.1056/KEVXpb7245656)       If you have any questions or concerns, please call the clinic at the number listed above.       Sincerely,      Benjamin Rosenstein, MD

## 2022-09-27 NOTE — PROGRESS NOTES
Medicare Wellness Visit  Health Risk Assessment           Health Risk Assessment / Review of Systems     Constitutional: Any fevers or night sweats? No     Eyes:  Vision problems    YES -wears bifocals, last eye exam about 2 years ago.    Hearing Do you feel you have hearing loss?  No     Cardiovascular: Any chest pain, fast or irregular heart beat, calf pain with walking?     No           Respiratory:   Any breathing problems or cough?   No     Gastrointestinal: Any stomach or stool problems?   No      Genitourinary: Do you have difficulty controlling urination?   No, may have leakage when cough or sneeze.     Muscles and Joints: Any joint stiffness or soreness?    YES - back, hips and knee discomfort.    Skin: Any concerning lesions or moles?    YES - red bumps/ rash on lower legs x 5 days right side worse than left. Reports itchy, has applied liquid Benadryl with minimal help.    Nervous System: Any loss of strength or feeling, numbness or tingling, shaking, dizziness, or headache?   YES - hx sciatica. Reports right foot weakness and tingling sensation to painful at times especially when sit for a long time. Work position- sit most of the time, .    Mental Health: Any depression, anxiety or problems sleeping?     YES - poor quality sleep, only get about 5 hours of sleep at night.    Cognition: Do you have any problems with your memory?  No            Medical Care     What other specialists or organizations are involved in your medical care?  none  Patient Care Team       Relationship Specialty Notifications Start End    Mario Guthrie,  PCP - General Student in organized health care education/training program  7/27/21     Phone: [phone] Fax: [phone]         [facility] [address]    Cory Mancia MD MD Urology  5/20/21     Phone: [phone] Fax: [phone]         [address] [address]    Elda Wu, RN Specialty Care Coordinator  Urology  5/21/21     Phone: [phone] Pager: [phone]        Cory Mancia MD Assigned Surgical Provider   6/6/21     Phone: [phone] Fax: [phone] [address] [facility] [address]    Mario Guthrie DO Assigned PCP   8/1/21     Phone: [phone] Fax: [phone]         [facility] [address]          Have you been to the ER or overnight in the hospital in the last year?  No          Social History / Home Safety       Marital Status:Single  Who lives in your household? self    Do you feel threatened or controlled by a partner, ex-partner or anyone in your life? No     Has anyone hurt you physically, for example by pushing, hitting, slapping or kicking you   or forcing you to have sex? No          Does your home have any of the following safety concerns; loose rugs in the hallway,  bathrooms with no grab bars by the tub or toilet, stairs with no handrails or poorly lit areas?   YES - no grab bars in the bathroom    Do you need help with dressing yourself, bathing, eating or getting around your home?  No     Do you need help with the phone, transportation, shopping, preparing meals, housework, laundry, medications or managing money? YES - daughter helps with shopping and son helps with laundry.      Risk Behaviors and Healthy Habits     History   Smoking Status     Former Smoker     Types: Cigarettes     Quit date: 12/13/2018   Smokeless Tobacco     Never Used     How many servings of fruits and vegetables do you eat a day? Due to fruits being so pricey and expensive, only get fruits and vegetable servings about 3 times a week.    Exercise: walk daily x 7 days a week, will walk one block then must rest, does stretching and sometimes dancing exercises.     Do you frequently drive without a seatbelt? No     Do you use tobacco?  No     Do you use any other drugs? No         Do you use alcohol?No      Frailty Assessment            Have you lost 10 or more  pounds unintentionally in the previous year? No     How difficult is walking from one room to the other on the same level?mildly       Is it difficult to lift or carry something as heavy as 10 pounds?mildly      Compared with most (men/women) your age, would you say  that you are more active, less active, or about the same? less        FALL RISK ASSESSMENT 9/27/2022 5/20/2021 1/11/2019   Fallen 2 or more times in the past year? No No No   Any fall with injury in the past year? No No No   Timed Up and Go Test/Seconds (13.5 is a fall risk; contact physician) 17 11 -         Advance Directives:   Discussed with patient and family as appropriate. Has patient  completed advance directives and/or a living will? yes      Elli Stephen RN

## 2022-09-28 LAB
CREAT UR-MCNC: 63.6 MG/DL
MICROALBUMIN UR-MCNC: <12 MG/L
MICROALBUMIN/CREAT UR: NORMAL MG/G{CREAT}

## 2022-09-28 RX ORDER — LANCETS
1 EACH MISCELLANEOUS DAILY
Qty: 100 EACH | Refills: 3 | Status: SHIPPED | OUTPATIENT
Start: 2022-09-28

## 2022-09-28 RX ORDER — CLINDAMYCIN PHOSPHATE 10 MG/G
GEL TOPICAL 2 TIMES DAILY
Qty: 1 ML | Refills: 0 | Status: SHIPPED | OUTPATIENT
Start: 2022-09-28 | End: 2022-09-28

## 2022-09-28 RX ORDER — CLINDAMYCIN PHOSPHATE 10 MG/G
GEL TOPICAL 2 TIMES DAILY
Qty: 1 ML | Refills: 0 | Status: SHIPPED | OUTPATIENT
Start: 2022-09-28 | End: 2023-11-16

## 2022-10-10 ENCOUNTER — TRANSFERRED RECORDS (OUTPATIENT)
Dept: HEALTH INFORMATION MANAGEMENT | Facility: CLINIC | Age: 62
End: 2022-10-10

## 2022-10-12 ENCOUNTER — TELEPHONE (OUTPATIENT)
Dept: FAMILY MEDICINE | Facility: CLINIC | Age: 62
End: 2022-10-12

## 2022-10-12 DIAGNOSIS — E11.9 TYPE 2 DIABETES MELLITUS WITHOUT COMPLICATION, WITHOUT LONG-TERM CURRENT USE OF INSULIN (H): ICD-10-CM

## 2022-10-12 NOTE — TELEPHONE ENCOUNTER
Called pt twice and pharmacy to straighten out refill request and sent note to Dr. Rosenstein for:  [facility] [address] [phone]/fax [phone] needs 3 new prescriptions for Meter/strips/lancets for her:  Freddy Contour Easy (no longer made, but till dies can get supplies per pharmacist Anoop)  Contour test strips  Microlet lancets    Thanks  Lorraine Ochoa LPN

## 2022-10-17 ENCOUNTER — ANCILLARY PROCEDURE (OUTPATIENT)
Dept: MAMMOGRAPHY | Facility: CLINIC | Age: 62
End: 2022-10-17
Attending: FAMILY MEDICINE
Payer: COMMERCIAL

## 2022-10-17 DIAGNOSIS — Z12.31 ENCOUNTER FOR SCREENING MAMMOGRAM FOR MALIGNANT NEOPLASM OF BREAST: ICD-10-CM

## 2022-10-17 PROCEDURE — 77067 SCR MAMMO BI INCL CAD: CPT

## 2022-11-12 ENCOUNTER — OFFICE VISIT (OUTPATIENT)
Dept: OPHTHALMOLOGY | Facility: CLINIC | Age: 62
End: 2022-11-12
Attending: FAMILY MEDICINE
Payer: COMMERCIAL

## 2022-11-12 DIAGNOSIS — E11.9 TYPE 2 DIABETES MELLITUS WITHOUT COMPLICATION, WITHOUT LONG-TERM CURRENT USE OF INSULIN (H): ICD-10-CM

## 2022-11-12 PROCEDURE — 92015 DETERMINE REFRACTIVE STATE: CPT | Performed by: STUDENT IN AN ORGANIZED HEALTH CARE EDUCATION/TRAINING PROGRAM

## 2022-11-12 PROCEDURE — 92004 COMPRE OPH EXAM NEW PT 1/>: CPT | Performed by: STUDENT IN AN ORGANIZED HEALTH CARE EDUCATION/TRAINING PROGRAM

## 2022-11-12 ASSESSMENT — VISUAL ACUITY
OS_CC: 20/25
CORRECTION_TYPE: GLASSES
METHOD: SNELLEN - LINEAR
OS_CC+: -2
OD_CC+: +2
OD_CC: 20/25

## 2022-11-12 ASSESSMENT — REFRACTION_MANIFEST
OD_CYLINDER: SPHERE
OD_ADD: +2.50
OD_SPHERE: -0.50
OS_AXIS: 010
OS_CYLINDER: +0.50
OS_ADD: +2.50
OS_SPHERE: -1.00

## 2022-11-12 ASSESSMENT — EXTERNAL EXAM - LEFT EYE: OS_EXAM: NORMAL

## 2022-11-12 ASSESSMENT — TONOMETRY
IOP_METHOD: ICARE
OS_IOP_MMHG: 13
OD_IOP_MMHG: 16

## 2022-11-12 ASSESSMENT — REFRACTION_WEARINGRX
OS_ADD: +2.00
OD_ADD: +2.00
OS_CYLINDER: +0.50
SPECS_TYPE: BIFOCAL
OD_CYLINDER: SPHERE
OS_SPHERE: -1.00
OD_SPHERE: -0.50
OS_AXIS: 003

## 2022-11-12 ASSESSMENT — CONF VISUAL FIELD
OD_SUPERIOR_TEMPORAL_RESTRICTION: 0
OD_SUPERIOR_NASAL_RESTRICTION: 0
METHOD: COUNTING FINGERS
OS_NORMAL: 1
OS_INFERIOR_TEMPORAL_RESTRICTION: 0
OS_INFERIOR_NASAL_RESTRICTION: 0
OS_SUPERIOR_TEMPORAL_RESTRICTION: 0
OD_NORMAL: 1
OS_SUPERIOR_NASAL_RESTRICTION: 0
OD_INFERIOR_NASAL_RESTRICTION: 0
OD_INFERIOR_TEMPORAL_RESTRICTION: 0

## 2022-11-12 ASSESSMENT — EXTERNAL EXAM - RIGHT EYE: OD_EXAM: NORMAL

## 2022-11-12 ASSESSMENT — SLIT LAMP EXAM - LIDS
COMMENTS: NORMAL
COMMENTS: NORMAL

## 2022-11-12 NOTE — NURSING NOTE
Chief Complaints and History of Present Illnesses   Patient presents with     Eye Exam For Diabetes     Chief Complaint(s) and History of Present Illness(es)     Eye Exam For Diabetes            Laterality: both eyes    Course: stable    Associated symptoms: Negative for eye pain, flashes and floaters    Treatments tried: no treatments          Comments    Here for diabetic exam. Last eye exam was a few years ago. Since last exam, vision has been gradually decreasing in both eyes. Uses allergy drops as needed. No eye pain. No flashes or floaters.    LBS: 98 in the morning a couple days ago  Lab Results       Component                Value               Date                       A1C                      6.4                 09/27/2022                 A1C                      6.4                 10/28/2021                 A1C                      6.5                 05/20/2021                 A1C                      6.5                 08/06/2020                 A1C                      6.1                 10/31/2019                 A1C                      6.2                 04/05/2019                 A1C                      6.0                 05/30/2018              Too Moon COT 9:45 AM November 12, 2022

## 2022-11-12 NOTE — PROGRESS NOTES
CC -   diabetic eye exam    INTERVAL HISTORY - Initial visit    PMH -   Sushila Flores is a 61 year old female with PMH of pre-DM who is here for a diabetic eye exam.     Last A1c:   Hemoglobin A1C   Date Value Ref Range Status   09/27/2022 6.4 (H) 0.0 - 5.6 % Final     Comment:     Normal <5.7%   Prediabetes 5.7-6.4%    Diabetes 6.5% or higher     Note: Adopted from ADA consensus guidelines.   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  I personally obtained the chief complaint(s) and history of present illness.  I confirmed and edited as necessary the review of systems, past medical/surgical history, family history, social history, and examination findings as documented by others; and I examined the patient myself.  I personally reviewed the relevant tests, images, and reports as documented above.  I formulated and edited as necessary the assessment and plan and discussed the findings and management plan with the patient and family      Kelvin Wu MD  Retina  Fellow  Department of Ophthalmology  [facility]  Pager: [phone]

## 2022-11-14 ENCOUNTER — TRANSFERRED RECORDS (OUTPATIENT)
Dept: HEALTH INFORMATION MANAGEMENT | Facility: CLINIC | Age: 62
End: 2022-11-14

## 2022-12-04 DIAGNOSIS — G89.29 CHRONIC LEFT-SIDED LOW BACK PAIN WITH LEFT-SIDED SCIATICA: ICD-10-CM

## 2022-12-04 DIAGNOSIS — M54.42 CHRONIC LEFT-SIDED LOW BACK PAIN WITH LEFT-SIDED SCIATICA: ICD-10-CM

## 2022-12-05 RX ORDER — CYCLOBENZAPRINE HCL 10 MG
10 TABLET ORAL 3 TIMES DAILY PRN
Qty: 30 TABLET | Refills: 0 | Status: SHIPPED | OUTPATIENT
Start: 2022-12-05 | End: 2023-03-27

## 2022-12-12 ENCOUNTER — TRANSFERRED RECORDS (OUTPATIENT)
Dept: HEALTH INFORMATION MANAGEMENT | Facility: CLINIC | Age: 62
End: 2022-12-12

## 2023-03-25 DIAGNOSIS — G89.29 CHRONIC LEFT-SIDED LOW BACK PAIN WITH LEFT-SIDED SCIATICA: ICD-10-CM

## 2023-03-25 DIAGNOSIS — M54.42 CHRONIC LEFT-SIDED LOW BACK PAIN WITH LEFT-SIDED SCIATICA: ICD-10-CM

## 2023-03-27 RX ORDER — CYCLOBENZAPRINE HCL 10 MG
10 TABLET ORAL 3 TIMES DAILY PRN
Qty: 30 TABLET | Refills: 0 | Status: SHIPPED | OUTPATIENT
Start: 2023-03-27 | End: 2023-11-07

## 2023-03-29 ENCOUNTER — TELEPHONE (OUTPATIENT)
Dept: OPHTHALMOLOGY | Facility: CLINIC | Age: 63
End: 2023-03-29
Payer: COMMERCIAL

## 2023-04-29 ENCOUNTER — HEALTH MAINTENANCE LETTER (OUTPATIENT)
Age: 63
End: 2023-04-29

## 2023-10-23 ENCOUNTER — TELEPHONE (OUTPATIENT)
Dept: OPHTHALMOLOGY | Facility: CLINIC | Age: 63
End: 2023-10-23

## 2023-10-23 NOTE — TELEPHONE ENCOUNTER
Spoke with patient confirming appointment on 10/26/23. Informed patient she is scheduled too soon for possible Insurance coverage. Rescheduled patient to after 11/12/23. Sent reminder latter to confirmed address. -Per Patient

## 2023-11-07 ENCOUNTER — OFFICE VISIT (OUTPATIENT)
Dept: URGENT CARE | Facility: URGENT CARE | Age: 63
End: 2023-11-07
Payer: COMMERCIAL

## 2023-11-07 VITALS
DIASTOLIC BLOOD PRESSURE: 86 MMHG | TEMPERATURE: 98.5 F | OXYGEN SATURATION: 99 % | WEIGHT: 202 LBS | SYSTOLIC BLOOD PRESSURE: 149 MMHG | BODY MASS INDEX: 35.35 KG/M2 | HEART RATE: 84 BPM

## 2023-11-07 DIAGNOSIS — E11.9 TYPE 2 DIABETES MELLITUS WITHOUT COMPLICATION, WITHOUT LONG-TERM CURRENT USE OF INSULIN (H): ICD-10-CM

## 2023-11-07 DIAGNOSIS — G89.29 CHRONIC LEFT-SIDED LOW BACK PAIN WITH LEFT-SIDED SCIATICA: ICD-10-CM

## 2023-11-07 DIAGNOSIS — G89.29 OTHER CHRONIC BACK PAIN: ICD-10-CM

## 2023-11-07 DIAGNOSIS — M54.89 OTHER CHRONIC BACK PAIN: ICD-10-CM

## 2023-11-07 DIAGNOSIS — M54.41 ACUTE RIGHT-SIDED LOW BACK PAIN WITH RIGHT-SIDED SCIATICA: Primary | ICD-10-CM

## 2023-11-07 DIAGNOSIS — M54.42 CHRONIC LEFT-SIDED LOW BACK PAIN WITH LEFT-SIDED SCIATICA: ICD-10-CM

## 2023-11-07 PROCEDURE — 96372 THER/PROPH/DIAG INJ SC/IM: CPT | Performed by: FAMILY MEDICINE

## 2023-11-07 PROCEDURE — 99214 OFFICE O/P EST MOD 30 MIN: CPT | Mod: 25 | Performed by: FAMILY MEDICINE

## 2023-11-07 RX ORDER — CYCLOBENZAPRINE HCL 10 MG
10 TABLET ORAL 3 TIMES DAILY PRN
Qty: 30 TABLET | Refills: 0 | Status: SHIPPED | OUTPATIENT
Start: 2023-11-07 | End: 2023-11-16

## 2023-11-07 RX ORDER — KETOROLAC TROMETHAMINE 30 MG/ML
30 INJECTION, SOLUTION INTRAMUSCULAR; INTRAVENOUS ONCE
Status: COMPLETED | OUTPATIENT
Start: 2023-11-07 | End: 2023-11-07

## 2023-11-07 RX ADMIN — KETOROLAC TROMETHAMINE 30 MG: 30 INJECTION, SOLUTION INTRAMUSCULAR; INTRAVENOUS at 12:54

## 2023-11-07 NOTE — PROGRESS NOTES
Chief Complaint   Patient presents with    Urgent Care    Knee Pain     C/O  back pain and right  knee pain with leg pain for 2 weeks       Sushila was seen today for urgent care and knee pain.    Diagnoses and all orders for this visit:    Acute right-sided low back pain with right-sided sciatica  -     ketorolac (TORADOL) injection 30 mg    Other chronic back pain    Type 2 diabetes mellitus without complication, without long-term current use of insulin (H)    Chronic left-sided low back pain with left-sided sciatica  -     cyclobenzaprine (FLEXERIL) 10 MG tablet; Take 1 tablet (10 mg) by mouth 3 times daily as needed for muscle spasms        D/d  lumbar strain, osteoarthritis of lumbar spine, fibromyalgia syndrome, and muscle spasm    PLAN:  For acute pain, rest, intermittent application of heat (do not sleep on heating pad), analgesics and muscle relaxants are recommended. Discussed longer term treatment plan of prn NSAID's and discussed a home back care exercise program with flexion exercise routine. Proper lifting with avoidance of heavy lifting discussed. Consider Physical Therapy and XRay studies if not improving. Call or return to clinic prn if these symptoms worsen or fail to improve as anticipated.  Encouraged her to do good control of blood sugar   Elevated BP from her pain   Continue doing back exercises   Also do back stretching and cold packs   Muscle relaxants to help with the pain   Follow up if  symptoms fail to improve or worsens   Pt understood and agreed with plan       As no red flag signs I do not think she needs xray or further work up now         SUBJECTIVE:   Sushila Flores is a 62 year old female who complains of low back pain for 2 weeks, positional with bending or lifting, with radiation down the legs. Precipitating factors: none recalled by the patient. Prior history of back problems: recurrent self limited episodes of low back pain in the past. There is no numbness in the legs.some  tingling in the feet       OBJECTIVE:  BP (!) 149/86   Pulse 84   Temp 98.5  F (36.9  C) (Tympanic)   Wt 91.6 kg (202 lb)   SpO2 99%   BMI 35.35 kg/m     Patient appears to be in mild to moderate pain, antalgic gait noted. Lumbosacral spine area reveals no local tenderness or mass.  Painful and reduced LS ROM noted. Straight leg raise is positive at 50 degrees on right. DTR's, motor strength and sensation normal, including heel and toe gait.  Peripheral pulses are palpable. X-Ray: not indicated.      Shyanne Sprague MD

## 2023-11-07 NOTE — PATIENT INSTRUCTIONS
Continue doing back exercises   Also do back stretching and cold packs   Muscle relaxants to help with the pain   Follow up if  symptoms fail to improve or worsens   Pt understood and agreed with plan         Shyanne Sprague MD

## 2023-11-09 NOTE — COMMUNITY RESOURCES LIST (ENGLISH)
11/09/2023   [facility]  N/A  For questions about this resource list or additional care needs, please contact your primary care clinic or care manager.  Phone: [phone]   Email: N/A   Address: [address] [place] [address]   Hours: N/A        Transportation       Free or low-cost transportation  1  Small RUST Distance: 1.08 miles      In-Person   [address] [place] [address]  Language: English, Greenlandic  Hours: Mon 9:00 AM - 5:00 PM , Tue 9:30 AM - 7:00 PM , Wed 9:00 AM - 5:00 PM , Thu 9:30 AM - 7:00 PM , Fri 9:00 AM - 5:00 PM  Fees: Free   Phone: (199) 895-5301 Email: contactus@[employer] Website: http://www.[employer]     2  [facility] Distance: 4.18 miles      In-Person   [address] [place] [address]  Language: English  Hours: Mon - Fri 8:00 AM - 3:00 PM  Fees: Free   Phone: (958) 306-1862 Ext.14 Email: neighborhood@[facility] Website: http://www.[facility].Tigermed     Transportation to medical appointments  3  [facility] - Non-Emergency Medical Transportation Distance: 3.59 miles      In-Person   [address] [place] [address]  Language: English  Hours: Mon - Fri 8:00 AM - 4:00 PM Appt. Only  Fees: Self Pay   Phone: (937) 739-9045 Website: http://www.[facility].org/Medical-Services/Emergency-medical-services/Non-emergency-transportation/     4  Discover Ride Distance: 5.18 miles      In-Person   [address] [address]  Language: English  Hours: Mon - Thu 6:00 AM - 6:00 PM , Fri 6:00 AM - 5:00 PM  Fees: Insurance, Self Pay   Phone: (896) 149-7343 Email: office@[employer] Website: https://www.[employer]/          Important Numbers & Websites       Emergency Services   911  City Services   311  Poison Control   (670) 932-7774  Suicide Prevention Lifeline   (497) 881-2107 (TALK)  Child Abuse Hotline   (825) 342-8506 (4-A-Child)  Sexual Assault Hotline   (378)  719-5740 (HOPE)  National Runaway Safeline   (905) 972-7014 (RUNAWAY)  All-Options Talkline   (189) 724-2181  Substance Abuse Referral   (236) 698-2331 (HELP)

## 2023-11-15 NOTE — COMMUNITY RESOURCES LIST (ENGLISH)
11/15/2023   [facility]  N/A  For questions about this resource list or additional care needs, please contact your primary care clinic or care manager.  Phone: [phone]   Email: N/A   Address: [address] [place] [address]   Hours: N/A        Transportation       Free or low-cost transportation  1  Small RUST Distance: 1.08 miles      In-Person   [address] [place] [address]  Language: English, Turks and [place] Islander  Hours: Mon 9:00 AM - 5:00 PM , Tue 9:30 AM - 7:00 PM , Wed 9:00 AM - 5:00 PM , Thu 9:30 AM - 7:00 PM , Fri 9:00 AM - 5:00 PM  Fees: Free   Phone: (239) 991-7380 Email: contactus@[employer] Website: http://www.[employer]     2  [facility] Distance: 4.18 miles      In-Person   [address] [place] [address]  Language: English  Hours: Mon - Fri 8:00 AM - 3:00 PM  Fees: Free   Phone: (372) 992-6570 Ext.14 Email: neighborhood@[facility] Website: http://www.[facility].[employer]     Transportation to medical appointments  3  [facility] - Non-Emergency Medical Transportation Distance: 3.59 miles      In-Person   [address] [place] [address]  Language: English  Hours: Mon - Fri 8:00 AM - 4:00 PM Appt. Only  Fees: Self Pay   Phone: (440) 945-2661 Website: http://www.[facility].org/Medical-Services/Emergency-medical-services/Non-emergency-transportation/     4  Discover Ride Distance: 5.18 miles      In-Person   [address] [address]  Language: English  Hours: Mon - Thu 6:00 AM - 6:00 PM , Fri 6:00 AM - 5:00 PM  Fees: Insurance, Self Pay   Phone: (961) 185-7193 Email: office@[employer] Website: https://www.[employer]/          Important Numbers & Websites       Emergency Services   911  City Services   311  Poison Control   (687) 808-5587  Suicide Prevention Lifeline   (607) 594-6271 (TALK)  Child Abuse Hotline   (454) 754-1782 (4-A-Child)  Sexual Assault Hotline   (123)  594-0890 (HOPE)  National Runaway Safeline   (386) 461-3283 (RUNAWAY)  All-Options Talkline   (774) 341-6698  Substance Abuse Referral   (261) 824-1375 (HELP)

## 2023-11-16 ENCOUNTER — OFFICE VISIT (OUTPATIENT)
Dept: FAMILY MEDICINE | Facility: CLINIC | Age: 63
End: 2023-11-16
Payer: COMMERCIAL

## 2023-11-16 VITALS
OXYGEN SATURATION: 99 % | TEMPERATURE: 98 F | SYSTOLIC BLOOD PRESSURE: 138 MMHG | HEART RATE: 97 BPM | BODY MASS INDEX: 33.63 KG/M2 | DIASTOLIC BLOOD PRESSURE: 84 MMHG | RESPIRATION RATE: 18 BRPM | WEIGHT: 197 LBS | HEIGHT: 64 IN

## 2023-11-16 DIAGNOSIS — E66.01 MORBID OBESITY (H): ICD-10-CM

## 2023-11-16 DIAGNOSIS — M47.812 SPONDYLOSIS OF CERVICAL REGION WITHOUT MYELOPATHY OR RADICULOPATHY: ICD-10-CM

## 2023-11-16 DIAGNOSIS — L73.9 FOLLICULITIS: ICD-10-CM

## 2023-11-16 DIAGNOSIS — M48.061 SPINAL STENOSIS OF LUMBAR REGION, UNSPECIFIED WHETHER NEUROGENIC CLAUDICATION PRESENT: ICD-10-CM

## 2023-11-16 DIAGNOSIS — M54.42 CHRONIC LEFT-SIDED LOW BACK PAIN WITH LEFT-SIDED SCIATICA: ICD-10-CM

## 2023-11-16 DIAGNOSIS — G89.29 CHRONIC LEFT-SIDED LOW BACK PAIN WITH LEFT-SIDED SCIATICA: ICD-10-CM

## 2023-11-16 DIAGNOSIS — E11.9 TYPE 2 DIABETES MELLITUS WITHOUT COMPLICATION, WITHOUT LONG-TERM CURRENT USE OF INSULIN (H): ICD-10-CM

## 2023-11-16 DIAGNOSIS — J30.2 SEASONAL ALLERGIC RHINITIS, UNSPECIFIED TRIGGER: Primary | ICD-10-CM

## 2023-11-16 PROCEDURE — 99214 OFFICE O/P EST MOD 30 MIN: CPT | Mod: GC

## 2023-11-16 RX ORDER — CLINDAMYCIN PHOSPHATE 10 MG/G
GEL TOPICAL 2 TIMES DAILY
Qty: 1 ML | Refills: 0 | Status: SHIPPED | OUTPATIENT
Start: 2023-11-16

## 2023-11-16 RX ORDER — FLUTICASONE PROPIONATE 50 MCG
1 SPRAY, SUSPENSION (ML) NASAL DAILY
Qty: 18.2 ML | Refills: 1 | Status: SHIPPED | OUTPATIENT
Start: 2023-11-16 | End: 2024-02-26

## 2023-11-16 RX ORDER — CETIRIZINE HYDROCHLORIDE 10 MG/1
10 TABLET ORAL DAILY
Qty: 90 TABLET | Refills: 1 | Status: SHIPPED | OUTPATIENT
Start: 2023-11-16 | End: 2024-02-26

## 2023-11-16 RX ORDER — CYCLOBENZAPRINE HCL 10 MG
10 TABLET ORAL 3 TIMES DAILY PRN
Qty: 30 TABLET | Refills: 0 | Status: SHIPPED | OUTPATIENT
Start: 2023-11-16 | End: 2024-06-13

## 2023-11-16 RX ORDER — PREDNISONE 20 MG/1
TABLET ORAL
Qty: 20 TABLET | Refills: 0 | Status: SHIPPED | OUTPATIENT
Start: 2023-11-16

## 2023-11-16 ASSESSMENT — ENCOUNTER SYMPTOMS
SORE THROAT: 0
FREQUENCY: 1
CONSTIPATION: 1
DIZZINESS: 0
SHORTNESS OF BREATH: 0
PALPITATIONS: 0
HEARTBURN: 1
ARTHRALGIAS: 1
WEAKNESS: 0
PARESTHESIAS: 0
FEVER: 0
MYALGIAS: 1
DYSURIA: 0
HEADACHES: 0
CHILLS: 0
DIARRHEA: 0
NERVOUS/ANXIOUS: 1
NAUSEA: 0
BREAST MASS: 0
ABDOMINAL PAIN: 0
HEMATOCHEZIA: 0
EYE PAIN: 0
COUGH: 0
HEMATURIA: 0
JOINT SWELLING: 1

## 2023-11-16 ASSESSMENT — ACTIVITIES OF DAILY LIVING (ADL)
CURRENT_FUNCTION: HOUSEWORK REQUIRES ASSISTANCE
CURRENT_FUNCTION: TRANSPORTATION REQUIRES ASSISTANCE

## 2023-11-16 NOTE — COMMUNITY RESOURCES LIST (ENGLISH)
11/16/2023   [facility]  N/A  For additional resource needs, please contact your health insurance member services or your primary care team.  Phone: [phone]   Email: N/A   Address: [facility] [place] [address]   Hours: N/A        Transportation       Free or low-cost transportation  1  [facility] Distance: 1.08 miles      In-Person   [address] [place] [address]  Language: English, Mohawk  Hours: Mon 9:00 AM - 5:00 PM , Tue 9:30 AM - 7:00 PM , Wed 9:00 AM - 5:00 PM , Thu 9:30 AM - 7:00 PM , Fri 9:00 AM - 5:00 PM  Fees: Free   Phone: (509) 152-8534 Email: contactus@[employer] Website: http://www.[employer]     2  [facility] Distance: 4.18 miles      In-Person   [address] [place] [address]  Language: English  Hours: Mon - Fri 8:00 AM - 3:00 PM  Fees: Free   Phone: (104) 547-6804 Ext.14 Email: neighborhood@[facility] With[facility] Website: http://www.[facility].[employer]Email [employer]     Transportation to medical appointments  3  [facility] - Non-Emergency Medical Transportation Distance: 3.59 miles      In-Person   [address] [place] [address]  Language: English  Hours: Mon - Fri 8:00 AM - 4:00 PM Appt. Only  Fees: Self Pay   Phone: (826) 998-7376 Website: http://www.[facility].org/Medical-Services/Emergency-medical-services/Non-emergency-transportation/     4  Discover Ride Distance: 5.18 miles      In-Person   [address] [address]  Language: English  Hours: Mon - Thu 6:00 AM - 6:00 PM , Fri 6:00 AM - 5:00 PM  Fees: Insurance, Self Pay   Phone: (561) 608-6272 Email: office@[employer] Website: https://www.[employer]/          Important Numbers & Websites       [address].[facility]  Poison Control   (495) 932-6211 [facility], Avonoison.org  Suicide and Crisis Lifeline   988 988[facility] [place] [facility] Medical Centerline.org  Childhelp [place] Child Abuse Hotline   685.102.3527 Childhelphotline.org  National  Sexual Assault Hotline   (157) 915-1118 (HOPE) Rainn.org  National Runaway Safeline   (584) 650-5460 (RUNAWAY) imgfavePlastio.org  Pregnancy & Postpartum Support [place]   Call/text 339-524-5950 Ppsupport[place].org  Substance Abuse National Helpline ([facility]   807-636-HELP (4111) Findtreatment.gov  Emergency Services   91

## 2023-11-16 NOTE — COMMUNITY RESOURCES LIST (ENGLISH)
11/16/2023   [facility]  N/A  For additional resource needs, please contact your health insurance member services or your primary care team.  Phone: [phone]   Email: N/A   Address: [facility] [place] [address]   Hours: N/A        Transportation       Free or low-cost transportation  1  Small [facility] Distance: 1.08 miles      In-Person   [address] [place] [address]  Language: English, Khmer  Hours: Mon 9:00 AM - 5:00 PM , Tue 9:30 AM - 7:00 PM , Wed 9:00 AM - 5:00 PM , Thu 9:30 AM - 7:00 PM , Fri 9:00 AM - 5:00 PM  Fees: Free   Phone: (220) 741-1978 Email: contactus@[employer] Website: http://www.[employer]     2  [facility] Distance: 4.18 miles      In-Person   [address] [place] [address]  Language: English  Hours: Mon - Fri 8:00 AM - 3:00 PM  Fees: Free   Phone: (636) 727-2536 Ext.14 Email: neighborhood@[facility] Website: http://www.[facility].[employer]     Transportation to medical appointments  3  [facility] - Non-Emergency Medical Transportation Distance: 3.59 miles      In-Person   [address] [place] [address]  Language: English  Hours: Mon - Fri 8:00 AM - 4:00 PM Appt. Only  Fees: Self Pay   Phone: (882) 874-8560 Website: http://www.[facility].org/Medical-Services/Emergency-medical-services/Non-emergency-transportation/     4  [employer] Ride Distance: 5.18 miles      In-Person   [address] [address]  Language: English  Hours: Mon - Thu 6:00 AM - 6:00 PM , Fri 6:00 AM - 5:00 PM  Fees: Insurance, Self Pay   Phone: (712) 278-5325 Email: office@[employer] Website: https://www.[employer]/          Important Numbers & Websites       [address]  Poison Control   (333) 245-9589 [facility] Hospital[facility].org  Suicide and Crisis Lifeline   988 988[facility] Hospit[facility].org  Childhelp [place] Child Abuse Hotline   325.341.6801 Childhelphotline.org  National  Sexual Assault Hotline   (791) 881-3885 (HOPE) Rainn.org  National Runaway Safeline   (881) 445-5946 (RUNAWAY) [employer].org  Pregnancy & Postpartum Support [place]   Call/text 743-869-8062 Ppsupport[place].org  Substance Abuse National Helpline ([facility]   577-509-HELP (4696) Findtreatment.gov  Emergency Services   914

## 2023-11-16 NOTE — PROGRESS NOTES
Assessment & Plan   Sushila Flores is a 62 year old female presenting for preventative physical. However, patient preferring to discuss acute concern of worsening lower back pain today.     Chronic Low Back Pain with Sciatica   Listed History of Spinal Stenosis   Patient with ongoing lower back pain. Last seen in our clinic in September 2022 where similar symptoms were present. Physical therapy and orthopedic referral placed. Physical therapy provided no relief; orthopedic clinic attempted to manage conservatively with NSAIDs and steroid injections but ultimately recommended surgical intervention which patient is not interested in. Discussed today that we have limited solutions to offer beyond that which was offered at ortho clinic. Recommended follow up with ortho and will prescribe steroid burst in the meantime. Also recommend continued physical therapy which patient will consider. Of note, physical exam today was positive for straight leg raise but patient denying any loss of bowel or bladder control, denying symptoms of saddle anesthesia.    - steroid burst with taper    - physical therapy referral    - recommending reconnecting with orthopedics clinic    - refill of flexeril per patient request, discussed precautions     Folliculitis   Previous diagnosis. Physical exam today again showing lesions on lower left leg consistent with folliculitis. Topical antibiotics improved previously, requesting refill    - topical clindamycin BID for 1-2 weeks to affected area     Seasonal Allergies   Requesting refills.    - Zyrtec daily    - Flonase daily      Type II Diabetes   Last A1c in 09/2022 was 6.4, at goal. Not currently on any medications and opting to continue lifestyle modifications. Due for repeat A1c today, but patient declining bloodwork. Recommend A1c, BMP, urine mircroalbumin at next visit.     Subjective   Sushila is a 62 year old, presenting for the following health issues:  Forms ([employer] ) and  "Musculoskeletal Problem (Knee and back pain )    HPI   Here for scheduled physical. Also would like to discuss acute concerns.     Urgent care last week for knee and back pain. Chronic, worsening over the past few years. Now using a cane. 10/10 pain on palpation of lower back, positive straight leg raise. Does have a documented history of spondylosis of cervical spine. Denies any loss of bowel or bladder control. Discussed steroid taper and then scheduled tylenol/ibuprofen thereafter. Ultimately recommended physical therapy.     Review of Systems   Constitutional, HEENT, cardiovascular, pulmonary, gi and gu systems are negative, except as otherwise noted.      Objective    /84   Pulse 97   Temp 98  F (36.7  C) (Tympanic)   Resp 18   Ht 1.626 m (5' 4\")   Wt 89.4 kg (197 lb)   SpO2 99%   BMI 33.81 kg/m    Body mass index is 33.81 kg/m .  Physical Exam   General: Appears to be in pain. Tangential speech.   Respiratory: No respiratory distress.   Cardiac: RRR.  Abdominal: Abdomen is soft and non-tender without distention.  Extremities: Tenderness to palpation over left lower back. Positive straight leg raise of the left. Ambulating cautiously, reports to use cane but not with the patient today.   Skin: Mild inflammation and superficial papules on left lower leg, consistent with folliculitis.   Neurological: Motor function is grossly normal.  Psychiatric: Somewhat poor insight re: previous recommendations for presenting symptoms     Melanie Turcios MD PGY-2  [facility]            "

## 2023-11-16 NOTE — COMMUNITY RESOURCES LIST (ENGLISH)
11/16/2023   [facility]  N/A  For additional resource needs, please contact your health insurance member services or your primary care team.  Phone: [phone]   Email: N/A   Address: [facility] [place] [address]   Hours: N/A        Transportation       Free or low-cost transportation  1  [facility] Distance: 1.08 miles      In-Person   [address] [place] [address]  Language: English, Welsh  Hours: Mon 9:00 AM - 5:00 PM , Tue 9:30 AM - 7:00 PM , Wed 9:00 AM - 5:00 PM , Thu 9:30 AM - 7:00 PM , Fri 9:00 AM - 5:00 PM  Fees: Free   Phone: (280) 168-8072 Email: contactus@[employer] Website: [email or web address].[employer]     2  [facility] Distance: 4.18 miles      In-Person   [address] [place] [address]  Language: English  Hours: Mon - Fri 8:00 AM - 3:00 PM  Fees: Free   Phone: (409) 489-3509 Ext.14 Email: neighborhood@[facility], [place] Website: http://www.[facility].[employer]     Transportation to medical appointments  3  [facility] - Non-Emergency Medical Transportation Distance: 3.59 miles      In-Person   [address] [place] [address]  Language: English  Hours: Mon - Fri 8:00 AM - 4:00 PM Appt. Only  Fees: Self Pay   Phone: (520) 882-2633 Website: http://www.[facility].org/Medical-Services/Emergency-medical-services/Non-emergency-transportation/     4  [employer] Ride Distance: 5.18 miles      In-Person   [address] [address]  Language: English  Hours: Mon - Thu 6:00 AM - 6:00 PM , Fri 6:00 AM - 5:00 PM  Fees: Insurance, Self Pay   Phone: (141) 556-1907 Email: office@[employer] Website: https://www.[employer]/          Important Numbers & Websites       [address] [place]  Poison Control   (547) 383-2144 [facility].org  Suicide and Crisis Lifeline   988 988[place] Healthline.org  Childhelp [place] Child Abuse Hotline   193.310.1971 Childhelphotline.org  National  Sexual Assault Hotline   (290) 164-8931 (HOPE) Rainn.org  National Runaway Safeline   (415) 922-8768 (RUNAWAY) [employer].org  Pregnancy & Postpartum Support [place]   Call/text 358-444-6437 Ppsupportmn.org  Substance Abuse National Helpline ([facility]   327-645-HELP (4809) Findtreatment.gov  Emergency Services   91

## 2023-11-16 NOTE — PROGRESS NOTES
Preceptor Attestation:   Patient seen, evaluated and discussed with the resident. I have verified the content of the note, which accurately reflects my assessment of the patient and the plan of care.    Supervising Physician:Dinorah Lopez MD    [facility]

## 2023-11-30 ENCOUNTER — OFFICE VISIT (OUTPATIENT)
Dept: OPHTHALMOLOGY | Facility: CLINIC | Age: 63
End: 2023-11-30
Payer: COMMERCIAL

## 2023-11-30 DIAGNOSIS — H35.9 RETINAL PIGMENT EPITHELIUM ABNORMALITY: ICD-10-CM

## 2023-11-30 DIAGNOSIS — G51.4 EYELID MYOKYMIA: ICD-10-CM

## 2023-11-30 DIAGNOSIS — H25.13 NUCLEAR SCLEROSIS OF BOTH EYES: ICD-10-CM

## 2023-11-30 DIAGNOSIS — H52.13 MYOPIA OF BOTH EYES: ICD-10-CM

## 2023-11-30 DIAGNOSIS — H04.123 DRY EYE SYNDROME OF BOTH EYES: ICD-10-CM

## 2023-11-30 DIAGNOSIS — E11.9 DIABETES MELLITUS TYPE 2 WITHOUT RETINOPATHY (H): Primary | ICD-10-CM

## 2023-11-30 PROCEDURE — 92015 DETERMINE REFRACTIVE STATE: CPT | Performed by: OPTOMETRIST

## 2023-11-30 PROCEDURE — 92004 COMPRE OPH EXAM NEW PT 1/>: CPT | Performed by: OPTOMETRIST

## 2023-11-30 PROCEDURE — 92134 CPTRZ OPH DX IMG PST SGM RTA: CPT | Performed by: OPTOMETRIST

## 2023-11-30 ASSESSMENT — TONOMETRY
IOP_METHOD: ICARE
OS_IOP_MMHG: 18
OD_IOP_MMHG: 20

## 2023-11-30 ASSESSMENT — REFRACTION_WEARINGRX
OS_CYLINDER: +0.50
OD_CYLINDER: SPHERE
SPECS_TYPE: BIFOCAL
OS_ADD: +2.00
OS_SPHERE: -1.00
OD_ADD: +2.00
OS_AXIS: 003
OD_SPHERE: -0.50

## 2023-11-30 ASSESSMENT — VISUAL ACUITY
OS_CC: J2
OD_CC+: -1
OS_CC+: +1
OD_CC: J1
METHOD: SNELLEN - LINEAR
OS_CC: 20/30
OS_PH_CC: 20/25
OD_CC: 20/20
CORRECTION_TYPE: GLASSES

## 2023-11-30 ASSESSMENT — REFRACTION_MANIFEST
OD_CYLINDER: SPHERE
OD_ADD: +2.25
OS_ADD: +2.25
OD_SPHERE: -0.25
OS_SPHERE: -0.50
OS_CYLINDER: SPHERE

## 2023-11-30 ASSESSMENT — CONF VISUAL FIELD
OD_NORMAL: 1
OS_INFERIOR_NASAL_RESTRICTION: 0
METHOD: COUNTING FINGERS
OD_INFERIOR_NASAL_RESTRICTION: 0
OD_SUPERIOR_TEMPORAL_RESTRICTION: 0
OS_INFERIOR_TEMPORAL_RESTRICTION: 0
OD_INFERIOR_TEMPORAL_RESTRICTION: 0
OS_NORMAL: 1
OS_SUPERIOR_NASAL_RESTRICTION: 0
OS_SUPERIOR_TEMPORAL_RESTRICTION: 0
OD_SUPERIOR_NASAL_RESTRICTION: 0

## 2023-11-30 ASSESSMENT — EXTERNAL EXAM - LEFT EYE: OS_EXAM: NORMAL

## 2023-11-30 ASSESSMENT — CUP TO DISC RATIO
OD_RATIO: 0.5
OS_RATIO: 0.45

## 2023-11-30 ASSESSMENT — SLIT LAMP EXAM - LIDS
COMMENTS: NORMAL
COMMENTS: NORMAL

## 2023-11-30 ASSESSMENT — EXTERNAL EXAM - RIGHT EYE: OD_EXAM: NORMAL

## 2023-11-30 NOTE — NURSING NOTE
Chief Complaints and History of Present Illnesses   Patient presents with    COMPREHENSIVE EYE EXAM     Comprehensive exam/ Pre- Diabetic      Chief Complaint(s) and History of Present Illness(es)       COMPREHENSIVE EYE EXAM              Laterality: both eyes    Associated symptoms: Negative for eye pain, tearing and discharge    Treatments tried: eye drops    Pain scale: 0/10    Comments: Comprehensive exam/ Pre- Diabetic               Comments    Lab Results       Component                Value               Date                       A1C                      6.4                 09/27/2022                 A1C                      6.4                 10/28/2021                 A1C                      6.5                 05/20/2021                 A1C                      6.5                 08/06/2020                 A1C                      6.1                 10/31/2019                 A1C                      6.2                 04/05/2019                 A1C                      6.0                 05/30/2018      The past notices things can be blurry at times but feels could be related to allergy. Had been getting some white discharge each eye and PMD told this was allergy. OTC allergy drops does manage PRN each eye Opcan A gtts.. Left eye twitching intermitted the past few months.      Lynsey Lewis, COT COT 7:48 AM November 30, 2023

## 2023-11-30 NOTE — NURSING NOTE
Chief Complaints and History of Present Illnesses   Patient presents with    COMPREHENSIVE EYE EXAM     Comprehensive exam/ Pre- Diabetic      Chief Complaint(s) and History of Present Illness(es)       COMPREHENSIVE EYE EXAM              Laterality: both eyes    Associated symptoms: Negative for eye pain, tearing and discharge    Treatments tried: eye drops    Pain scale: 0/10    Comments: Comprehensive exam/ Pre- Diabetic               Comments    Lab Results       Component                Value               Date                       A1C                      6.4                 09/27/2022                 A1C                      6.4                 10/28/2021                 A1C                      6.5                 05/20/2021                 A1C                      6.5                 08/06/2020                 A1C                      6.1                 10/31/2019                 A1C                      6.2                 04/05/2019                 A1C                      6.0                 05/30/2018      The past notices things can be blurry at times but feels could be related to allergy. Had been getting some white discharge each eye and PMD told this was allergy. OTC allergy drops does manage PRN each eye. Left eye twitching intermitted the past few months.      RADHA Krause COT 7:48 AM November 30, 2023

## 2023-11-30 NOTE — PROGRESS NOTES
History  HPI       COMPREHENSIVE EYE EXAM    In both eyes.  Associated symptoms include Negative for eye pain, tearing and discharge.  Treatments tried include eye drops.  Pain was noted as 0/10. Additional comments: Comprehensive exam/ Pre- Diabetic              Comments    Lab Results       Component                Value               Date                       A1C                      6.4                 09/27/2022                 A1C                      6.4                 10/28/2021                 A1C                      6.5                 05/20/2021                 A1C                      6.5                 08/06/2020                 A1C                      6.1                 10/31/2019                 A1C                      6.2                 04/05/2019                 A1C                      6.0                 05/30/2018      The past notices things can be blurry at times but feels could be related to allergy. Had been getting some white discharge each eye and PMD told this was allergy. OTC allergy drops does manage PRN each eye Opcan A gtts.. Left eye twitching intermitted the past few months.      RADHA Krause COT 7:48 AM November 30, 2023                              Last edited by Lynsey Lewis COT on 11/30/2023  7:48 AM.          Assessment/Plan  (E11.9) Diabetes mellitus type 2 without retinopathy (H)  (primary encounter diagnosis)  Comment: No diabetic retinopathy  Plan:  Educated patient on clinical findings and the importance of continued management with primary care physician. Continue management as directed and return to clinic in 1 year for dilated exam, or sooner, as needed.    (H25.13) Nuclear sclerosis of both eyes  Comment: Not visually significant  Plan:  No treatment indicated at this time. Monitor annually.    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 Oliveira for retina consultation.    (H52.13) Myopia of both eyes  Comment: Minimal distance refractive error, difficulty at the computer with current glasses  Plan: REFRACTION         Dispensed spectacle prescription for as-needed wear. Discussed PAL correction and adaptation period, or consider OTC reading glasses. Monitor annually.    (H04.123) Dry eye syndrome of both eyes  Comment: Symptoms improved with artificial tears as needed   Plan:  Recommended warm compresses and artificial tears (Refresh or Systane) as needed. Monitor annually or sooner if symptoms do not improve.    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 Monitor as needed.    Complete documentation of historical and exam elements from today's encounter can  be found in the full encounter summary report (not reduplicated in this progress  note). I personally obtained the chief complaint(s) and history of present illness. I  confirmed and edited as necessary the review of systems, past medical/surgical  history, family history, social history, and examination findings as documented by  others; and I examined the patient myself. I personally reviewed the relevant tests,  images, and reports as documented above. I formulated and edited as necessary the  assessment and plan and discussed the findings and management plan with the  patient and family.    Sandeep Mancini OD, FAAO

## 2023-12-04 ENCOUNTER — ANCILLARY PROCEDURE (OUTPATIENT)
Dept: MAMMOGRAPHY | Facility: CLINIC | Age: 63
End: 2023-12-04
Payer: COMMERCIAL

## 2023-12-04 DIAGNOSIS — Z12.31 VISIT FOR SCREENING MAMMOGRAM: ICD-10-CM

## 2023-12-04 PROCEDURE — 77067 SCR MAMMO BI INCL CAD: CPT

## 2023-12-07 ENCOUNTER — TELEPHONE (OUTPATIENT)
Dept: OPHTHALMOLOGY | Facility: CLINIC | Age: 63
End: 2023-12-07
Payer: COMMERCIAL

## 2023-12-09 ENCOUNTER — HEALTH MAINTENANCE LETTER (OUTPATIENT)
Age: 63
End: 2023-12-09

## 2023-12-11 ENCOUNTER — TELEPHONE (OUTPATIENT)
Dept: OPHTHALMOLOGY | Facility: CLINIC | Age: 63
End: 2023-12-11
Payer: COMMERCIAL

## 2024-02-26 ENCOUNTER — OFFICE VISIT (OUTPATIENT)
Dept: FAMILY MEDICINE | Facility: CLINIC | Age: 64
End: 2024-02-26
Payer: COMMERCIAL

## 2024-02-26 ENCOUNTER — MYC MEDICAL ADVICE (OUTPATIENT)
Dept: FAMILY MEDICINE | Facility: CLINIC | Age: 64
End: 2024-02-26

## 2024-02-26 VITALS
SYSTOLIC BLOOD PRESSURE: 129 MMHG | HEIGHT: 64 IN | HEART RATE: 87 BPM | WEIGHT: 196 LBS | OXYGEN SATURATION: 100 % | DIASTOLIC BLOOD PRESSURE: 79 MMHG | TEMPERATURE: 98.6 F | BODY MASS INDEX: 33.46 KG/M2

## 2024-02-26 DIAGNOSIS — Z23 NEED FOR PROPHYLACTIC VACCINATION AND INOCULATION AGAINST INFLUENZA: ICD-10-CM

## 2024-02-26 DIAGNOSIS — E11.9 TYPE 2 DIABETES MELLITUS WITHOUT COMPLICATION, WITHOUT LONG-TERM CURRENT USE OF INSULIN (H): ICD-10-CM

## 2024-02-26 DIAGNOSIS — J30.2 SEASONAL ALLERGIC RHINITIS, UNSPECIFIED TRIGGER: ICD-10-CM

## 2024-02-26 DIAGNOSIS — R00.2 PALPITATIONS: ICD-10-CM

## 2024-02-26 DIAGNOSIS — J01.90 ACUTE SINUSITIS WITH SYMPTOMS > 10 DAYS: Primary | ICD-10-CM

## 2024-02-26 DIAGNOSIS — H93.13 TINNITUS, BILATERAL: ICD-10-CM

## 2024-02-26 DIAGNOSIS — Z23 HIGH PRIORITY FOR 2019-NCOV VACCINE: ICD-10-CM

## 2024-02-26 LAB
ANION GAP SERPL CALCULATED.3IONS-SCNC: 9 MMOL/L (ref 7–15)
BUN SERPL-MCNC: 17 MG/DL (ref 8–23)
CALCIUM SERPL-MCNC: 9.3 MG/DL (ref 8.8–10.2)
CHLORIDE SERPL-SCNC: 107 MMOL/L (ref 98–107)
CHOLEST SERPL-MCNC: 191 MG/DL
CREAT SERPL-MCNC: 0.93 MG/DL (ref 0.51–0.95)
DEPRECATED HCO3 PLAS-SCNC: 25 MMOL/L (ref 22–29)
EGFRCR SERPLBLD CKD-EPI 2021: 70 ML/MIN/1.73M2
FASTING STATUS PATIENT QL REPORTED: ABNORMAL
GLUCOSE SERPL-MCNC: 110 MG/DL (ref 70–99)
HBA1C MFR BLD: 6.6 % (ref 0–5.6)
HDLC SERPL-MCNC: 54 MG/DL
LDLC SERPL CALC-MCNC: 126 MG/DL
NONHDLC SERPL-MCNC: 137 MG/DL
POTASSIUM SERPL-SCNC: 4.5 MMOL/L (ref 3.4–5.3)
SODIUM SERPL-SCNC: 141 MMOL/L (ref 135–145)
TRIGL SERPL-MCNC: 55 MG/DL
TSH SERPL DL<=0.005 MIU/L-ACNC: 0.53 UIU/ML (ref 0.3–4.2)

## 2024-02-26 PROCEDURE — 99214 OFFICE O/P EST MOD 30 MIN: CPT | Mod: 25

## 2024-02-26 PROCEDURE — 90686 IIV4 VACC NO PRSV 0.5 ML IM: CPT

## 2024-02-26 PROCEDURE — 91320 SARSCV2 VAC 30MCG TRS-SUC IM: CPT

## 2024-02-26 PROCEDURE — 80048 BASIC METABOLIC PNL TOTAL CA: CPT

## 2024-02-26 PROCEDURE — 80061 LIPID PANEL: CPT

## 2024-02-26 PROCEDURE — 90480 ADMN SARSCOV2 VAC 1/ONLY CMP: CPT

## 2024-02-26 PROCEDURE — 90471 IMMUNIZATION ADMIN: CPT

## 2024-02-26 PROCEDURE — 83036 HEMOGLOBIN GLYCOSYLATED A1C: CPT

## 2024-02-26 PROCEDURE — 36415 COLL VENOUS BLD VENIPUNCTURE: CPT

## 2024-02-26 PROCEDURE — 93000 ELECTROCARDIOGRAM COMPLETE: CPT | Mod: GC

## 2024-02-26 PROCEDURE — 84443 ASSAY THYROID STIM HORMONE: CPT

## 2024-02-26 RX ORDER — DOXYCYCLINE 100 MG/1
100 CAPSULE ORAL 2 TIMES DAILY
Qty: 14 CAPSULE | Refills: 0 | Status: SHIPPED | OUTPATIENT
Start: 2024-02-26 | End: 2024-02-27

## 2024-02-26 RX ORDER — CETIRIZINE HYDROCHLORIDE 10 MG/1
10 TABLET ORAL DAILY
Qty: 90 TABLET | Refills: 1 | Status: SHIPPED | OUTPATIENT
Start: 2024-02-26 | End: 2024-02-27

## 2024-02-26 RX ORDER — FLUTICASONE PROPIONATE 50 MCG
1 SPRAY, SUSPENSION (ML) NASAL DAILY
Qty: 18.2 ML | Refills: 1 | Status: SHIPPED | OUTPATIENT
Start: 2024-02-26

## 2024-02-26 NOTE — PROGRESS NOTES
Preceptor Attestation:  Patient's case reviewed and discussed with the resident, Anna Willoughby MD. Patient seen and discussed with the resident, and I personally viewed the EKG and agree with the interpretation documented by the resident. I personally evaluated the patient.  I agree with the written assessment and plan of care.    Supervising Physician:  Majo Ortega MD MD  [facility]

## 2024-02-26 NOTE — PROGRESS NOTES
Prior to immunization administration, verified patients identity using patient s name and date of birth. Please see Immunization Activity for additional information.     Given today covid-19 and Flu    Screening Questionnaire for Adult Immunization    Are you sick today?   No   Do you have allergies to medications, food, a vaccine component or latex?   No   Have you ever had a serious reaction after receiving a vaccination?   No   Do you have a long-term health problem with heart, lung, kidney, or metabolic disease (e.g., diabetes), asthma, a blood disorder, no spleen, complement component deficiency, a cochlear implant, or a spinal fluid leak?  Are you on long-term aspirin therapy?   No   Do you have cancer, leukemia, HIV/AIDS, or any other immune system problem?   No   Do you have a parent, brother, or sister with an immune system problem?   No   In the past 3 months, have you taken medications that affect  your immune system, such as prednisone, other steroids, or anticancer drugs; drugs for the treatment of rheumatoid arthritis, Crohn s disease, or psoriasis; or have you had radiation treatments?   No   Have you had a seizure, or a brain or other nervous system problem?   No   During the past year, have you received a transfusion of blood or blood    products, or been given immune (gamma) globulin or antiviral drug?   No   For women: Are you pregnant or is there a chance you could become       pregnant during the next month?   No   Have you received any vaccinations in the past 4 weeks?   No     Immunization questionnaire answers were all negative.      Patient instructed to remain in clinic for 15 minutes afterwards, and to report any adverse reactions.     Screening performed by Breana Langston MA on 2/26/2024 at 9:53 AM.

## 2024-02-26 NOTE — PROGRESS NOTES
"  Assessment & Plan     Acute sinusitis with symptoms > 10 days  Patient presenting with a few weeks of progressive symptoms including purulent rhinorrhea, pain to frontal sinus c/w sinusitis and with symptoms >10 days, with indications for antibiotics; she has an allergy to penicillins, will prescribe doxycycline. Symptomatic treatment also prescribed. Other symptoms, including the palpitations and/or tinnitus could also be related to her sinusitis.   - cetirizine (ZYRTEC) 10 MG tablet; Take 1 tablet (10 mg) by mouth daily  - fluticasone (FLONASE) 50 MCG/ACT nasal spray; Spray 1 spray into both nostrils daily  - doxycycline hyclate (VIBRAMYCIN) 100 MG capsule; Take 1 capsule (100 mg) by mouth 2 times daily    Palpitations  Patient notes intermittent feelings of her \"pulse in her throat\". Denies personal or significant cardiovascular or thyroid history. EKG today in clinic was reassuring with normal sinus rhythm and no obvious abnormalities. Will check TSH and other labs for diabetes as below. If not improving by next follow-up, would consider zio patch. Counseled on staying hydrated and limiting caffeine intake. Patient does not consume alcohol or tobacco,  - EKG 12-lead complete w/read - Clinics  - TSH with free T4 reflex    Tinnitus, bilateral  Patient notes one year of tinnitus, worse in the past month with the symptoms above. In addition to treatment as above, will send for audiology referral to assess for hearing loss.  - Adult Audiology  Referral; Future    Seasonal allergic rhinitis, unspecified trigger  Will refill given symptoms above.  - cetirizine (ZYRTEC) 10 MG tablet; Take 1 tablet (10 mg) by mouth daily  - fluticasone (FLONASE) 50 MCG/ACT nasal spray; Spray 1 spray into both nostrils daily    Type 2 diabetes mellitus without complication (H)  Last A1C 6.4 one year ago, prior to that was 6.5. Due for diabetes labs today, will obtain. Not on any medications.  - Albumin Random Urine Quantitative " "with Creat Ratio; Future  - Lipid panel reflex to direct LDL Non-fasting  - HEMOGLOBIN A1C  - BASIC METABOLIC PANEL    Need for prophylactic vaccination and inoculation against influenza  Due for recommended flu vaccine.  - INFLUENZA VACCINE IM > 6 MONTHS VALENT IIV4 (AFLURIA/FLUZONE)    High priority for 2019-nCoV vaccine  Due to recommended covid vaccine.  - COVID-19 12+ (2023-24) (PFIZER)      BMI  Estimated body mass index is 33.64 kg/m  as calculated from the following:    Height as of this encounter: 1.626 m (5' 4\").    Weight as of this encounter: 88.9 kg (196 lb).   Weight management plan: Discussed healthy diet and exercise guidelines      Follow up in 2 weeks to assess improvement in sinusitis, palpitations, and follow up diabetes labs    Anna Willoughby MD PGY3  [facility] Res[facility]         Subjective   Sushila is a 63 year old, presenting for the following health issues:  rainging in the ear  (When it happen, feels like heart is racing and pause in the ears ( Both)  Has been a month ( Mostly on the Lft ears) ), Medication Request (Allergies pills ), Imm/Inj (Flu Shot), and Imm/Inj (COVID-19 VACCINE)    HPI     \"Concerns for high blood pressure, heartbeat in ears\"  - hx: elevated BMI, T2DM  - ringing in ears for a year, getting worse in the past month  - also \"feels heartbeat in neck/throat then up to the head like I feel my pulse in my head\"-- in the past month  - also notes pressure in her sinus, congestion; no fevers or purulent drainage from nose  - notes intermittent anxiety  - thyroid: thinks she did have a history in the past but doesn't remembers; family history of thyroid dysfunction unsure of diagnosis  - cardiovascular: no personal history, no significant family history  - caffeine, alcohol, over the counter medications-- stopped drinking caffeine last week helped a little; no alcohol; no tobacco use, DID take sinus OTC medications mucinex     Wt Readings from Last 4 Encounters: " "  02/26/24 88.9 kg (196 lb)   11/16/23 89.4 kg (197 lb)   11/07/23 91.6 kg (202 lb)   09/27/22 91.6 kg (202 lb)          Objective    /79   Pulse 87   Temp 98.6  F (37  C) (Oral)   Ht 1.626 m (5' 4\")   Wt 88.9 kg (196 lb)   SpO2 100%   BMI 33.64 kg/m    Body mass index is 33.64 kg/m .  Physical Exam   GENERAL: alert and no distress  EYES: Eyes grossly normal to inspection and pupils- ERRL  HENT: normal cephalic/atraumatic, ear canals and TM's normal, nasal mucosa edematous , rhinorrhea yellow, tonsillar erythema, and sinuses: frontal tenderness on right  NECK: no adenopathy, no asymmetry, masses, or scars  RESP: lungs clear to auscultation - no rales, rhonchi or wheezes  CV: regular rate and rhythm, normal S1 S2, no S3 or S4, no murmur, click or rub, no peripheral edema  ABDOMEN: soft, nontender, no hepatosplenomegaly, no masses and bowel sounds normal  MS: no gross musculoskeletal defects noted, no edema  NEURO: Normal strength and tone, mentation intact and speech normal        Signed Electronically by: Anna Willoughby MD  "

## 2024-02-27 RX ORDER — CETIRIZINE HYDROCHLORIDE 10 MG/1
10 TABLET ORAL DAILY
Qty: 90 TABLET | Refills: 1 | Status: SHIPPED | OUTPATIENT
Start: 2024-02-27

## 2024-02-27 RX ORDER — DOXYCYCLINE 100 MG/1
100 CAPSULE ORAL 2 TIMES DAILY
Qty: 14 CAPSULE | Refills: 0 | Status: SHIPPED | OUTPATIENT
Start: 2024-02-27

## 2024-02-27 NOTE — NURSING NOTE
As per patient's mychart encounter 2/27/2024, no other rx received by [facility]. Writer checked chart, system failed transaction. Writer resent prescriptions for Cetrizine and Doxycycline as per record for signed orders by Dr Willoughby for visit 2/26/2024.  Jc LEWIS

## 2024-02-27 NOTE — TELEPHONE ENCOUNTER
Second attempt to send new rxs- Cetrizine and Doxycycline via e-prescribe also was transmission to pharmacy failed. [facility] [place] as per prescriptions prescribed by Dr MEDARDO Willoughby for Cetrizine and Doxycycline. Patient informed via Electronifie messaging of completed actions. Jc LEWIS

## 2024-06-13 ENCOUNTER — MYC REFILL (OUTPATIENT)
Dept: FAMILY MEDICINE | Facility: CLINIC | Age: 64
End: 2024-06-13
Payer: COMMERCIAL

## 2024-06-13 DIAGNOSIS — M54.42 CHRONIC LEFT-SIDED LOW BACK PAIN WITH LEFT-SIDED SCIATICA: ICD-10-CM

## 2024-06-13 DIAGNOSIS — G89.29 CHRONIC LEFT-SIDED LOW BACK PAIN WITH LEFT-SIDED SCIATICA: ICD-10-CM

## 2024-06-14 RX ORDER — CYCLOBENZAPRINE HCL 10 MG
10 TABLET ORAL 3 TIMES DAILY PRN
Qty: 30 TABLET | Refills: 0 | Status: SHIPPED | OUTPATIENT
Start: 2024-06-14

## 2024-09-11 ENCOUNTER — PATIENT OUTREACH (OUTPATIENT)
Dept: FAMILY MEDICINE | Facility: CLINIC | Age: 64
End: 2024-09-11
Payer: COMMERCIAL

## 2024-09-11 NOTE — TELEPHONE ENCOUNTER
Patient Quality Outreach    Patient is due for the following:   Pt up to date     Next Steps:   No follow up needed at this time    Type of outreach:        Next Steps:  Reach out within 90 days via Phone.    Max number of attempts reached: No. Will try again in 90 days if patient still on fail list.    Questions for provider review:    None           Emma Castellon MA  Chart routed to Care Team.

## 2024-09-14 ENCOUNTER — HEALTH MAINTENANCE LETTER (OUTPATIENT)
Age: 64
End: 2024-09-14

## 2025-01-11 ENCOUNTER — HEALTH MAINTENANCE LETTER (OUTPATIENT)
Age: 65
End: 2025-01-11

## 2025-02-09 ENCOUNTER — HEALTH MAINTENANCE LETTER (OUTPATIENT)
Age: 65
End: 2025-02-09

## 2025-03-13 ENCOUNTER — PATIENT OUTREACH (OUTPATIENT)
Dept: FAMILY MEDICINE | Facility: CLINIC | Age: 65
End: 2025-03-13
Payer: MEDICAID

## 2025-03-13 NOTE — TELEPHONE ENCOUNTER
Patient Quality Outreach    Patient is due for the following:   Diabetes -  A1C    Action(s) Taken:   Patient has upcoming appointment, these items will be addressed at that time. On 3/20/2025 with Dr. Turcios     Type of outreach:    Chart review performed, no outreach needed.    Questions for provider review:    None           Emma Castellon MA  Chart routed to Care Team.

## 2025-03-20 ENCOUNTER — OFFICE VISIT (OUTPATIENT)
Dept: FAMILY MEDICINE | Facility: CLINIC | Age: 65
End: 2025-03-20
Payer: MEDICAID

## 2025-03-20 VITALS
SYSTOLIC BLOOD PRESSURE: 136 MMHG | OXYGEN SATURATION: 99 % | TEMPERATURE: 97.1 F | HEART RATE: 81 BPM | WEIGHT: 195.08 LBS | DIASTOLIC BLOOD PRESSURE: 83 MMHG | HEIGHT: 63 IN | RESPIRATION RATE: 18 BRPM | BODY MASS INDEX: 34.57 KG/M2

## 2025-03-20 DIAGNOSIS — E11.9 TYPE 2 DIABETES MELLITUS WITHOUT COMPLICATION, WITHOUT LONG-TERM CURRENT USE OF INSULIN (H): ICD-10-CM

## 2025-03-20 DIAGNOSIS — Z01.818 PREOP GENERAL PHYSICAL EXAM: ICD-10-CM

## 2025-03-20 DIAGNOSIS — Z87.448 HX OF CHRONIC KIDNEY DISEASE: Primary | ICD-10-CM

## 2025-03-20 DIAGNOSIS — F41.8 SITUATIONAL ANXIETY: ICD-10-CM

## 2025-03-20 LAB
ANION GAP SERPL CALCULATED.3IONS-SCNC: 9 MMOL/L (ref 3–14)
BUN SERPL-MCNC: 10 MG/DL (ref 7–30)
CALCIUM SERPL-MCNC: 10 MG/DL (ref 8.5–10.1)
CHLORIDE BLD-SCNC: 106 MMOL/L (ref 94–109)
CO2 SERPL-SCNC: 26 MMOL/L (ref 20–32)
CREAT SERPL-MCNC: 0.9 MG/DL (ref 0.52–1.04)
EGFRCR SERPLBLD CKD-EPI 2021: 72 ML/MIN/1.73M2
EST. AVERAGE GLUCOSE BLD GHB EST-MCNC: 143 MG/DL
GLUCOSE BLD-MCNC: 110 MG/DL (ref 70–99)
HBA1C MFR BLD: 6.6 % (ref 0–5.6)
POTASSIUM BLD-SCNC: 4.6 MMOL/L (ref 3.4–5.3)
SODIUM SERPL-SCNC: 141 MMOL/L (ref 135–145)

## 2025-03-20 NOTE — PROGRESS NOTES
Faculty Supervision of Residents   I have examined this patient and the medical care has been evaluated and discussed with the resident. See resident note outlining our discussion.medically optimized for surgery. Pt tearful about upcoming recovery. Cont psychosocial support    No Hernández MD

## 2025-03-20 NOTE — PATIENT INSTRUCTIONS
How to Take Your Medication Before Surgery  Preoperative Medication Instructions   Antiplatelet or Anticoagulation Medication Instructions   - We reviewed the medication list and the patient is not on an antiplatelet or anticoagulation medications.    Additional Medication Instructions   - meloxicam (Mobic): DO NOT TAKE 10 days before surgery.   - do not take Flexeril the day of surgery   - do not use Marijuana up until surgery        Patient Education   Preparing for Your Surgery  For Adults  Getting started  In most cases, a nurse will call to review your health history and instructions. They will give you an arrival time based on your scheduled surgery time. Please be ready to share:  Your doctor's clinic name and phone number  Your medical, surgical, and anesthesia history  A list of allergies and sensitivities  A list of medicines, including herbal treatments and over-the-counter drugs  Whether the patient has a legal guardian (ask how to send us the papers in advance)  Note: You may not receive a call if you were seen at our PAC (Preoperative Assessment Center).  Please tell us if you're pregnant--or if there's any chance you might be pregnant. Some surgeries may injure a fetus (unborn baby), so they require a pregnancy test. Surgeries that are safe for a fetus don't always need a test, and you can choose whether to have one.   Preparing for surgery  Within 10 to 30 days of surgery: Have a pre-op exam (sometimes called an H&P, or History and Physical). This can be done at a clinic or pre-operative center.  If you're having a , you may not need this exam. Talk to your care team.  At your pre-op exam, talk to your care team about all medicines you take. (This includes CBD oil and any drugs, such as THC, marijuana, and other forms of cannabis.) If you need to stop any medicine before surgery, ask when to start taking it again.  This is for your safety. Many medicines and drugs can make you bleed too much  during surgery. Some change how well surgery (anesthesia) drugs work.  Call your insurance company to let them know you're having surgery. (If you don't have insurance, call [phone].)  Call your clinic if there's any change in your health. This includes a scrape or scratch near the surgery site, or any signs of a cold (sore throat, runny nose, cough, rash, fever).  Eating and drinking guidelines  For your safety: Unless your surgeon tells you otherwise, follow the guidelines below.  Eat and drink as normal until 8 hours before you arrive for surgery. After that, no food or milk. You can spit out gum when you arrive.  Drink clear liquids until 2 hours before you arrive. These are liquids you can see through, like water, Gatorade, and Propel Water. They also include plain black coffee and tea (no cream or milk).  No alcohol for 24 hours before you arrive. The night before surgery, stop any drinks that contain THC.  If your care team tells you to take medicine on the morning of surgery, it's okay to take it with a sip of water. No other medicines or drugs are allowed (including CBD oil)--follow your care team's instructions.  If you have questions the day of surgery, call your hospital or surgery center.   Preventing infection  Shower or bathe the night before and the morning of surgery. Follow the instructions your clinic gave you. (If no instructions, use regular soap.)  Don't shave or clip hair near your surgery site. We'll remove the hair if needed.  Don't smoke or vape the morning of surgery. No chewing tobacco for 6 hours before you arrive. A nicotine patch is okay. You may spit out nicotine gum when you arrive.  For some surgeries, the surgeon will tell you to fully quit smoking and nicotine.  We will make every effort to keep you safe from infection. We will:  Clean our hands often with soap and water (or an alcohol-based hand rub).  Clean the skin at your surgery site with a special soap that kills  germs.  Give you a special gown to keep you warm. (Cold raises the risk of infection.)  Wear hair covers, masks, gowns, and gloves during surgery.  Give antibiotic medicine, if prescribed. Not all surgeries need this medicine.  What to bring on the day of surgery  Photo ID and insurance card  Copy of your health care directive, if you have one  Glasses and hearing aids (bring cases)  You can't wear contacts during surgery  Inhaler and eye drops, if you use them (tell us about these when you arrive)  CPAP machine or breathing device, if you use them  A few personal items, if spending the night  If you have . . .  A pacemaker, ICD (cardiac defibrillator), or other implant: Bring the ID card.  An implanted stimulator: Bring the remote control.  A legal guardian: Bring a copy of the certified (court-stamped) guardianship papers.  Please remove any jewelry, including body piercings. Leave jewelry and other valuables at home.  If you're going home the day of surgery  You must have a responsible adult drive you home. They should stay with you overnight as well.  If you don't have someone to stay with you, and you aren't safe to go home alone, we may keep you overnight. Insurance often won't pay for this.  After surgery  If it's hard to control your pain or you need more pain medicine, please call your surgeon's office.  Questions?   If you have any questions for your care team, list them here:   ____________________________________________________________________________________________________________________________________________________________________________________________________________________________________________________________  For informational purposes only. Not to replace the advice of your health care provider. Copyright   2003, 2019 [facility]. All rights reserved. Clinically reviewed by Star Koo MD. doxIQ [id number] - REV 08/24.

## 2025-03-20 NOTE — PROGRESS NOTES
Preoperative Evaluation  [facility] [address] [place] [address]  Phone: [phone]  Fax: [phone]  Primary Provider: Melanie Turcios MD  Pre-op Performing Provider: Melanie Turcios MD  Mar 20, 2025         3/19/2025   Surgical Information   What procedure is being done? Left [id number]. Posterior spinal fusion, Transforaminal interbody fusion   Facility or Hospital where procedure/surgery will be performed: [facility]   Who is doing the procedure / surgery? Dr Joss Duong   Date of surgery / procedure: 4/1/2025   Time of surgery / procedure: 48 hrs before surgery   Where do you plan to recover after surgery? at home with family     Fax number for surgical facility: ***    {Provider Charting Preference for Preop :718983}    Blue Conti is a 64 year old, presenting for the following:  Pre-Op Exam (Pt is having a back surgery on 4/1/2025 at [facility] Operating Room with Dr. Duong, Joss ROGEL MD/ )  Sushila Flores is a 64 year old female with a history of chronic left sided low back pain with left sided sciatica. Known to have moderate to severe spinal stenosis at multiple lumbar levels -- most recent MRI 12/27/24 reviewed.     Surgery: Back surgery on 4/1/25 per patient. Location ***.     Relevant PMH otherwise notable for: listed history of CKD (now resolved, last BMP 2/2/4 with creatinine 0.93 and GFR 70), Type II diabetes (last A1c 6.6 one year ago 2/2/24), obesity.     Prescribed atorvastatin 20 but otherwise not on antihypertensives or diabetes medications.   Takes mobic PRN, and flexeril PRN, smokes marijuana.   -     No known cardiac history. EKG 02/2024 with normal sinus rhythm.         3/20/2025     1:26 PM   Additional Questions   Roomed by Hser   Accompanied by Self         3/20/2025    Information    services provided? No     HPI: ***          3/19/2025   Pre-Op Questionnaire   Have you ever had a heart attack  or stroke? No   Have you ever had surgery on your heart or blood vessels, such as a stent placement, a coronary artery bypass, or surgery on an artery in your head, neck, heart, or legs? No   Do you have chest pain with activity? No   Do you have a history of heart failure? No   Do you currently have a cold, bronchitis or symptoms of other infection? No   Do you have a cough, shortness of breath, or wheezing? No   Do you or anyone in your family have previous history of blood clots? (!) UNKNOWN    Do you or does anyone in your family have a serious bleeding problem such as prolonged bleeding following surgeries or cuts? No   Have you ever had problems with anemia or been told to take iron pills? No   Have you had any abnormal blood loss such as black, tarry or bloody stools, or abnormal vaginal bleeding? No   Have you ever had a blood transfusion? No   Are you willing to have a blood transfusion if it is medically needed before, during, or after your surgery? Yes   Have you or any of your relatives ever had problems with anesthesia? No   Do you have sleep apnea, excessive snoring or daytime drowsiness? (!) UNKNOWN - discussed    Do you have any artifical heart valves or other implanted medical devices like a pacemaker, defibrillator, or continuous glucose monitor? No   Do you have artificial joints? No   Are you allergic to latex? No     Health Care Directive  Patient does not have a Health Care Directive: {ADVANCE_DIRECTIVE_STATUS:071888}    Preoperative Review of    reviewed - no record of controlled substances prescribed.  {Review [place] for all patients per ICSI MNPMP Profile:393946}    {Chronic problem details (Optional) :605252}    Patient Active Problem List    Diagnosis Date Noted    Cystocele, midline 06/02/2021     Priority: Medium    Voiding dysfunction 06/02/2021     Priority: Medium    Urinary urgency 06/02/2021     Priority: Medium    Urge incontinence 06/02/2021     Priority: Medium    Rectocele  06/02/2021     Priority: Medium    Atrophic vaginitis 06/02/2021     Priority: Medium    Urinary hesitancy 06/02/2021     Priority: Medium    Feeling of incomplete bladder emptying 06/02/2021     Priority: Medium    Morbid obesity (H) 05/20/2021     Priority: Medium    Special screening for malignant neoplasms, colon 09/03/2017     Priority: Medium     Repeat colonoscopy due 7/2027      Positive FIT (fecal immunochemical test) 04/13/2017     Priority: Medium     Referred to GI for colonoscopy on 4/13/17      Candidate for statin therapy due to risk of future cardiovascular event 03/21/2017     Priority: Medium     ASCVD 10 year risk of 5% on 3/21/17  Moderate intensity statin recommended with dx of DM      Type 2 diabetes mellitus without complication (H) 03/21/2017     Priority: Medium    BMI 36.0-36.9,adult 03/20/2017     Priority: Medium    Spondylosis of cervical region without myelopathy or radiculopathy 02/07/2017     Priority: Medium     12/29/15 cervical MRI: moderate C6/7 and C5/6 disc degeneration with dorsal annular bulging, no cord impingement      Pap smear for cervical cancer screening 03/25/2016     Priority: Medium     Normal cytology, negative HPV 12/24/15, repeat in 5 years      Bilateral carpal tunnel syndrome 12/24/2015     Priority: Medium    Osteoarthritis of multiple joints 12/24/2015     Priority: Medium    Chronic back pain 08/20/2014     Priority: Medium     Previously seen at [facility] spine -- surgery recommended by Dr. Sky Mensah (lumbar decompression with discectomy and fusion) on 3/10/16 but patient prefers to avoid surgery    12/29/15 lumbar MRI with 2mm spondylolisthesis at L5/S1 and L4/L5, 2 mm right posterolateral disk protrusion at T11/T12 -- progressed from 5/2014 comparison    Tried PT, epidural injections, NSAIDs      Vitamin D deficiency 09/03/2013     Priority: Medium    Sciatic nerve pain 03/01/2013     Priority: Medium      Past Medical History:   Diagnosis Date     "Bilateral carpal tunnel syndrome 2015    Chronic back pain 2014    Diabetes (H)     Osteoarthritis 2015     Past Surgical History:   Procedure Laterality Date    NO HISTORY OF SURGERY       Current Outpatient Medications   Medication Sig Dispense Refill    cetirizine (ZYRTEC) 10 MG tablet Take 1 tablet (10 mg) by mouth daily 90 tablet 1    cyclobenzaprine (FLEXERIL) 10 MG tablet Take 1 tablet (10 mg) by mouth 3 times daily as needed for muscle spasms. 60 tablet 0    fluticasone (FLONASE) 50 MCG/ACT nasal spray Spray 1 spray into both nostrils daily 18.2 mL 1    meloxicam (MOBIC) 15 MG tablet Take 1 tablet (15 mg) by mouth daily. 30 tablet 0    olopatadine (PATANOL) 0.1 % ophthalmic solution Place 1 drop into both eyes 2 times daily 5 mL 11       Allergies   Allergen Reactions    Amoxicillin Swelling    Penicillin G Nausea and Vomiting    Percocet [Oxycodone-Acetaminophen]     Vicodin [Hydrocodone-Acetaminophen]     Metformin Itching     All over itching        Social History     Tobacco Use    Smoking status: Former     Current packs/day: 0.00     Types: Cigarettes     Quit date: 2018     Years since quittin.2     Passive exposure: Never    Smokeless tobacco: Never   Substance Use Topics    Alcohol use: Yes     Alcohol/week: 0.0 standard drinks of alcohol     Comment: ocassionally     {FAMILY HISTORY (Optional):498164641}  History   Drug Use    Types: Marijuana         {ROS Picklists (Optional):875407}    Objective    /83   Pulse 81   Temp 97.1  F (36.2  C) (Tympanic)   Resp 18   Ht 1.61 m (5' 3.39\")   Wt 88.5 kg (195 lb 1.3 oz)   SpO2 99%   BMI 34.14 kg/m     Estimated body mass index is 34.14 kg/m  as calculated from the following:    Height as of this encounter: 1.61 m (5' 3.39\").    Weight as of this encounter: 88.5 kg (195 lb 1.3 oz).  Physical Exam  {Exam List :619440}    No results for input(s): \"HGB\", \"PLT\", \"INR\", \"NA\", \"POTASSIUM\", \"CR\", \"A1C\" in the last 60 " hours.     Diagnostics  Labs pending at this time.  Results will be reviewed when available.   No EKG required, no history of coronary heart disease, significant arrhythmia, peripheral arterial disease or other structural heart disease.    Revised Cardiac Risk Index (RCRI)  The patient has the following serious cardiovascular risks for perioperative complications:   - High risk surgery (>5% cardiac complication risk) = 1 point     RCRI Interpretation: 1 point: Class II (low risk - 0.9% complication rate)         Signed Electronically by: Melanie Turcios MD  A copy of this evaluation report is provided to the requesting physician.    {Provider Resources  Preop [facility] Preop Guidelines  Revised Cardiac Risk Index :090714}   {Email feedback regarding this note to [email or web address]   :667503}   " Ht 1.61 m (5' 3.39\")   Wt 88.5 kg (195 lb 1.3 oz)   SpO2 99%   BMI 34.14 kg/m     Estimated body mass index is 34.14 kg/m  as calculated from the following:    Height as of this encounter: 1.61 m (5' 3.39\").    Weight as of this encounter: 88.5 kg (195 lb 1.3 oz).  Physical Exam  GENERAL: alert and no distress  EYES: Eyes grossly normal to inspection, PERRL and conjunctivae and sclerae normal  HENT: ear canals and TM's normal, nose and mouth without ulcers or lesions  NECK: no adenopathy, no asymmetry, masses, or scars  RESP: lungs clear to auscultation - no rales, rhonchi or wheezes  CV: regular rate and rhythm, normal S1 S2, no S3 or S4, no murmur, click or rub, no peripheral edema  ABDOMEN: soft, nontender, no hepatosplenomegaly, no masses and bowel sounds normal  MS: no gross musculoskeletal defects noted, no edema  SKIN: no suspicious lesions or rashes  NEURO: Normal strength and tone, mentation intact and speech normal  PSYCH: mentation appears normal, affect normal/bright    No results for input(s): \"HGB\", \"PLT\", \"INR\", \"NA\", \"POTASSIUM\", \"CR\", \"A1C\" in the last 8760 hours.     Diagnostics  Labs pending at this time.  Results will be reviewed when available.   No EKG required, no history of coronary heart disease, significant arrhythmia, peripheral arterial disease or other structural heart disease.    Revised Cardiac Risk Index (RCRI)  The patient has the following serious cardiovascular risks for perioperative complications:   - High risk surgery (>5% cardiac complication risk) = 1 point     RCRI Interpretation: 1 point: Class II (low risk - 0.9% complication rate)         Signed Electronically by: Melanie Turcios MD  A copy of this evaluation report is provided to the requesting physician.           "

## 2025-03-20 NOTE — Clinical Note
Hi team,   Could you please forward this pre-op note to the following:   [facility] fax # [phone] Pre-procedure Assessment department (PPA)  Thanks!   Melanie

## 2025-03-24 NOTE — NURSING NOTE
MA fax over preoperative evaluation to [facility].   Fax number: [phone]  Phone number: [phone]

## 2025-04-07 ENCOUNTER — TELEPHONE (OUTPATIENT)
Dept: FAMILY MEDICINE | Facility: CLINIC | Age: 65
End: 2025-04-07
Payer: MEDICAID

## 2025-04-07 NOTE — TELEPHONE ENCOUNTER
Order/Referral Request    Who is requesting: [facility] OT    Orders being requested:     OT 1x a week for 4 weeks  ADL training   Fall prevention  Activities tolerance   Standing balance

## 2025-04-07 NOTE — TELEPHONE ENCOUNTER
Home Care is calling regarding an established patient with [facility].  Requesting orders from: Melanie Turcios: RN able to provide verbal orders.  Sending as FYI only.  Is this a request for a temporary pause in the home care episode?  No    Orders Requested    Occupational Therapy  Request for initial certification (first set of orders)   Frequency: 1xwk/4wk  RN gave verbal order: Yes          Contacts       Contact Date/Time Type Contact Phone/Fax    04/07/2025 02:55 PM CDT Phone (Incoming) Katie (Other) [phone]     [facility]          Sonia Molina RN

## 2025-04-08 ENCOUNTER — TELEPHONE (OUTPATIENT)
Dept: FAMILY MEDICINE | Facility: CLINIC | Age: 65
End: 2025-04-08
Payer: MEDICAID

## 2025-04-08 NOTE — TELEPHONE ENCOUNTER
Home Care is calling regarding an established patient with [facility].  Requesting orders from: Melanie Turcios RN APPROVED: RN able to provide verbal orders.  Home Care will send orders for signature.  RN will close encounter.  Is this a request for a temporary pause in the home care episode?  No    Orders Requested    Physical Therapy  Request for initial certification (first set of orders)   Frequency: 1x week for 5 weeks  RN gave verbal order: Yes        Phone number Home Care can be reached at: [phone]  Okay to leave a detailed message?: Yes    Contacts       Contact Date/Time Type Contact Phone/Fax    04/08/2025 11:12 AM CDT Phone (Incoming) shantel [phone]     Therese Stephen RN

## 2025-04-23 ENCOUNTER — TELEPHONE (OUTPATIENT)
Dept: FAMILY MEDICINE | Facility: CLINIC | Age: 65
End: 2025-04-23
Payer: MEDICAID

## 2025-04-23 NOTE — TELEPHONE ENCOUNTER
Forms/Letter Request     Type of form/letter: Home Health Certification        Do we have the form/letter: Yes: order number [id number]  Certification period 04/7/2025 to 06/5/2025     Who is the form from? Home care - [facility]     Where did/will the form come from? form was faxed in     When is form/letter needed by: asap     How would you like the form/letter returned: Fax : [phone]     Patient Notified form requests are processed in 5-7 business days:No     Okay to leave a detailed message?: No

## 2025-05-05 NOTE — TELEPHONE ENCOUNTER
Rcvd duplicate form 5/5/2025. Was unable to obtain original form. Please complete duplicate ASAP and fax back. Thank you!

## 2025-05-08 NOTE — TELEPHONE ENCOUNTER
Document signed and completed.     Melanie Turcios MD PGY-3  [facility]

## 2025-08-26 ENCOUNTER — OFFICE VISIT (OUTPATIENT)
Dept: FAMILY MEDICINE | Facility: CLINIC | Age: 65
End: 2025-08-26
Payer: MEDICARE

## 2025-08-26 VITALS
BODY MASS INDEX: 35.61 KG/M2 | HEIGHT: 63 IN | RESPIRATION RATE: 20 BRPM | SYSTOLIC BLOOD PRESSURE: 115 MMHG | HEART RATE: 82 BPM | WEIGHT: 201 LBS | TEMPERATURE: 98.3 F | DIASTOLIC BLOOD PRESSURE: 76 MMHG | OXYGEN SATURATION: 98 %

## 2025-08-26 DIAGNOSIS — Z20.1 EXPOSURE TO TB: Primary | ICD-10-CM

## 2025-08-26 DIAGNOSIS — E11.9 TYPE 2 DIABETES MELLITUS WITHOUT COMPLICATION, WITHOUT LONG-TERM CURRENT USE OF INSULIN (H): ICD-10-CM

## 2025-08-26 LAB
CHOLEST SERPL-MCNC: 223 MG/DL
EST. AVERAGE GLUCOSE BLD GHB EST-MCNC: 137 MG/DL
FASTING STATUS PATIENT QL REPORTED: ABNORMAL
HBA1C MFR BLD: 6.4 % (ref 0–5.6)
HDLC SERPL-MCNC: 65 MG/DL
LDLC SERPL CALC-MCNC: 139 MG/DL
NONHDLC SERPL-MCNC: 158 MG/DL
TRIGL SERPL-MCNC: 97 MG/DL

## 2025-08-28 LAB
GAMMA INTERFERON BACKGROUND BLD IA-ACNC: 0.04 IU/ML
M TB IFN-G BLD-IMP: NEGATIVE
M TB IFN-G CD4+ BCKGRND COR BLD-ACNC: 9.96 IU/ML
MITOGEN IGNF BCKGRD COR BLD-ACNC: 0.01 IU/ML
MITOGEN IGNF BCKGRD COR BLD-ACNC: 0.02 IU/ML
QUANTIFERON MITOGEN: 10 IU/ML
QUANTIFERON NIL TUBE: 0.04 IU/ML
QUANTIFERON TB1 TUBE: 0.05 IU/ML
QUANTIFERON TB2 TUBE: 0.06

## (undated) RX ORDER — SULFAMETHOXAZOLE/TRIMETHOPRIM 800-160 MG
TABLET ORAL
Status: DISPENSED
Start: 2021-07-19